# Patient Record
Sex: FEMALE | Race: WHITE | NOT HISPANIC OR LATINO | Employment: OTHER | URBAN - METROPOLITAN AREA
[De-identification: names, ages, dates, MRNs, and addresses within clinical notes are randomized per-mention and may not be internally consistent; named-entity substitution may affect disease eponyms.]

---

## 2018-01-02 ENCOUNTER — CONVERSION ENCOUNTER (OUTPATIENT)
Dept: OTHER | Facility: HOSPITAL | Age: 58
End: 2018-01-02

## 2018-03-02 ENCOUNTER — OFFICE VISIT CONVERTED (OUTPATIENT)
Dept: FAMILY MEDICINE CLINIC | Age: 58
End: 2018-03-02
Attending: FAMILY MEDICINE

## 2018-07-13 ENCOUNTER — OFFICE VISIT CONVERTED (OUTPATIENT)
Dept: FAMILY MEDICINE CLINIC | Age: 58
End: 2018-07-13
Attending: FAMILY MEDICINE

## 2018-10-12 ENCOUNTER — OFFICE VISIT CONVERTED (OUTPATIENT)
Dept: FAMILY MEDICINE CLINIC | Age: 58
End: 2018-10-12
Attending: FAMILY MEDICINE

## 2019-01-18 ENCOUNTER — HOSPITAL ENCOUNTER (OUTPATIENT)
Dept: OTHER | Facility: HOSPITAL | Age: 59
Discharge: HOME | End: 2019-01-18
Attending: FAMILY MEDICINE

## 2019-01-18 ENCOUNTER — OFFICE VISIT CONVERTED (OUTPATIENT)
Dept: FAMILY MEDICINE CLINIC | Age: 59
End: 2019-01-18
Attending: FAMILY MEDICINE

## 2019-01-18 LAB
ALBUMIN SERPL-MCNC: 3.8 G/DL (ref 3.5–5)
ALBUMIN/GLOB SERPL: 1.1 {RATIO} (ref 1.4–2.6)
ALP SERPL-CCNC: 88 U/L (ref 53–141)
ALT SERPL-CCNC: 31 U/L (ref 10–40)
ANION GAP SERPL CALC-SCNC: 21 MMOL/L (ref 8–19)
AST SERPL-CCNC: 29 U/L (ref 15–50)
BILIRUB SERPL-MCNC: 0.3 MG/DL (ref 0.2–1.3)
BUN SERPL-MCNC: 18 MG/DL (ref 5–25)
BUN/CREAT SERPL: 20 {RATIO} (ref 6–20)
CALCIUM SERPL-MCNC: 10.1 MG/DL (ref 8.7–10.4)
CHLORIDE SERPL-SCNC: 95 MMOL/L (ref 99–111)
CHOLEST SERPL-MCNC: 146 MG/DL (ref 107–200)
CHOLEST/HDLC SERPL: 3.5 {RATIO} (ref 3–6)
CONV CO2: 26 MMOL/L (ref 22–32)
CONV TOTAL PROTEIN: 7.4 G/DL (ref 6.3–8.2)
CREAT UR-MCNC: 0.92 MG/DL (ref 0.5–0.9)
EST. AVERAGE GLUCOSE BLD GHB EST-MCNC: 171 MG/DL
GFR SERPLBLD BASED ON 1.73 SQ M-ARVRAT: >60 ML/MIN/{1.73_M2}
GLOBULIN UR ELPH-MCNC: 3.6 G/DL (ref 2–3.5)
GLUCOSE SERPL-MCNC: 180 MG/DL (ref 65–99)
HBA1C MFR BLD: 7.6 % (ref 3.5–5.7)
HDLC SERPL-MCNC: 42 MG/DL (ref 40–60)
LDLC SERPL CALC-MCNC: 59 MG/DL (ref 70–100)
OSMOLALITY SERPL CALC.SUM OF ELEC: 292 MOSM/KG (ref 273–304)
POTASSIUM SERPL-SCNC: 3.8 MMOL/L (ref 3.5–5.3)
SODIUM SERPL-SCNC: 138 MMOL/L (ref 135–147)
TRIGL SERPL-MCNC: 224 MG/DL (ref 40–150)
VLDLC SERPL-MCNC: 45 MG/DL (ref 5–37)

## 2019-01-24 LAB
CONV TRAMADOL (URINE): 5424 NG/ML
O-DESMETHYLTRAMADOL, UR: 2032 NG/ML

## 2019-05-06 ENCOUNTER — OFFICE VISIT CONVERTED (OUTPATIENT)
Dept: FAMILY MEDICINE CLINIC | Age: 59
End: 2019-05-06
Attending: FAMILY MEDICINE

## 2019-09-06 ENCOUNTER — OFFICE VISIT CONVERTED (OUTPATIENT)
Dept: FAMILY MEDICINE CLINIC | Age: 59
End: 2019-09-06
Attending: FAMILY MEDICINE

## 2019-09-06 ENCOUNTER — HOSPITAL ENCOUNTER (OUTPATIENT)
Dept: OTHER | Facility: HOSPITAL | Age: 59
Discharge: HOME | End: 2019-09-06
Attending: FAMILY MEDICINE

## 2019-09-06 LAB
ALBUMIN SERPL-MCNC: 3.9 G/DL (ref 3.5–5)
ALBUMIN/GLOB SERPL: 1 {RATIO} (ref 1.4–2.6)
ALP SERPL-CCNC: 100 U/L (ref 53–141)
ALT SERPL-CCNC: 28 U/L (ref 10–40)
ANION GAP SERPL CALC-SCNC: 23 MMOL/L (ref 8–19)
AST SERPL-CCNC: 24 U/L (ref 15–50)
BILIRUB SERPL-MCNC: 0.3 MG/DL (ref 0.2–1.3)
BUN SERPL-MCNC: 19 MG/DL (ref 5–25)
BUN/CREAT SERPL: 23 {RATIO} (ref 6–20)
CALCIUM SERPL-MCNC: 9.6 MG/DL (ref 8.7–10.4)
CHLORIDE SERPL-SCNC: 95 MMOL/L (ref 99–111)
CHOLEST SERPL-MCNC: 159 MG/DL (ref 107–200)
CHOLEST/HDLC SERPL: 3.7 {RATIO} (ref 3–6)
CONV CO2: 23 MMOL/L (ref 22–32)
CONV CREATININE URINE, RANDOM: 98.9 MG/DL (ref 10–300)
CONV MICROALBUM.,U,RANDOM: <12 MG/L (ref 0–20)
CONV TOTAL PROTEIN: 7.7 G/DL (ref 6.3–8.2)
CREAT UR-MCNC: 0.82 MG/DL (ref 0.5–0.9)
EST. AVERAGE GLUCOSE BLD GHB EST-MCNC: 177 MG/DL
GFR SERPLBLD BASED ON 1.73 SQ M-ARVRAT: >60 ML/MIN/{1.73_M2}
GLOBULIN UR ELPH-MCNC: 3.8 G/DL (ref 2–3.5)
GLUCOSE SERPL-MCNC: 161 MG/DL (ref 65–99)
HBA1C MFR BLD: 7.8 % (ref 3.5–5.7)
HDLC SERPL-MCNC: 43 MG/DL (ref 40–60)
LDLC SERPL CALC-MCNC: 65 MG/DL (ref 70–100)
MICROALBUMIN/CREAT UR: 12.1 MG/G{CRE} (ref 0–35)
OSMOLALITY SERPL CALC.SUM OF ELEC: 290 MOSM/KG (ref 273–304)
POTASSIUM SERPL-SCNC: 3.9 MMOL/L (ref 3.5–5.3)
SODIUM SERPL-SCNC: 137 MMOL/L (ref 135–147)
TRIGL SERPL-MCNC: 256 MG/DL (ref 40–150)
VLDLC SERPL-MCNC: 51 MG/DL (ref 5–37)

## 2019-12-09 ENCOUNTER — OFFICE VISIT CONVERTED (OUTPATIENT)
Dept: FAMILY MEDICINE CLINIC | Age: 59
End: 2019-12-09
Attending: FAMILY MEDICINE

## 2020-02-03 ENCOUNTER — HOSPITAL ENCOUNTER (OUTPATIENT)
Dept: OTHER | Facility: HOSPITAL | Age: 60
Discharge: HOME | End: 2020-02-03
Attending: FAMILY MEDICINE

## 2020-05-15 ENCOUNTER — OFFICE VISIT CONVERTED (OUTPATIENT)
Dept: FAMILY MEDICINE CLINIC | Age: 60
End: 2020-05-15
Attending: FAMILY MEDICINE

## 2020-08-28 ENCOUNTER — HOSPITAL ENCOUNTER (OUTPATIENT)
Dept: OTHER | Facility: HOSPITAL | Age: 60
Discharge: HOME | End: 2020-08-28
Attending: FAMILY MEDICINE

## 2020-08-28 LAB
ALBUMIN SERPL-MCNC: 4.1 G/DL (ref 3.5–5)
ALBUMIN/GLOB SERPL: 1.1 {RATIO} (ref 1.4–2.6)
ALP SERPL-CCNC: 99 U/L (ref 53–141)
ALT SERPL-CCNC: 32 U/L (ref 10–40)
ANION GAP SERPL CALC-SCNC: 19 MMOL/L (ref 8–19)
AST SERPL-CCNC: 28 U/L (ref 15–50)
BILIRUB SERPL-MCNC: 0.33 MG/DL (ref 0.2–1.3)
BUN SERPL-MCNC: 19 MG/DL (ref 5–25)
BUN/CREAT SERPL: 20 {RATIO} (ref 6–20)
CALCIUM SERPL-MCNC: 10.5 MG/DL (ref 8.7–10.4)
CHLORIDE SERPL-SCNC: 97 MMOL/L (ref 99–111)
CHOLEST SERPL-MCNC: 171 MG/DL (ref 107–200)
CHOLEST/HDLC SERPL: 3.6 {RATIO} (ref 3–6)
CONV CO2: 26 MMOL/L (ref 22–32)
CONV CREATININE URINE, RANDOM: 96.7 MG/DL (ref 10–300)
CONV MICROALBUM.,U,RANDOM: <12 MG/L (ref 0–20)
CONV TOTAL PROTEIN: 8 G/DL (ref 6.3–8.2)
CREAT UR-MCNC: 0.94 MG/DL (ref 0.5–0.9)
EST. AVERAGE GLUCOSE BLD GHB EST-MCNC: 180 MG/DL
GFR SERPLBLD BASED ON 1.73 SQ M-ARVRAT: >60 ML/MIN/{1.73_M2}
GLOBULIN UR ELPH-MCNC: 3.9 G/DL (ref 2–3.5)
GLUCOSE SERPL-MCNC: 153 MG/DL (ref 65–99)
HBA1C MFR BLD: 7.9 % (ref 3.5–5.7)
HDLC SERPL-MCNC: 47 MG/DL (ref 40–60)
LDLC SERPL CALC-MCNC: 81 MG/DL (ref 70–100)
MICROALBUMIN/CREAT UR: 12.4 MG/G{CRE} (ref 0–35)
OSMOLALITY SERPL CALC.SUM OF ELEC: 291 MOSM/KG (ref 273–304)
POTASSIUM SERPL-SCNC: 4.1 MMOL/L (ref 3.5–5.3)
SODIUM SERPL-SCNC: 138 MMOL/L (ref 135–147)
TRIGL SERPL-MCNC: 213 MG/DL (ref 40–150)
VLDLC SERPL-MCNC: 43 MG/DL (ref 5–37)

## 2020-09-04 ENCOUNTER — OFFICE VISIT CONVERTED (OUTPATIENT)
Dept: FAMILY MEDICINE CLINIC | Age: 60
End: 2020-09-04
Attending: FAMILY MEDICINE

## 2020-09-04 ENCOUNTER — HOSPITAL ENCOUNTER (OUTPATIENT)
Dept: OTHER | Facility: HOSPITAL | Age: 60
Discharge: HOME | End: 2020-09-04
Attending: FAMILY MEDICINE

## 2020-09-11 LAB
CONV TRAMADOL (URINE): NORMAL NG/ML
O-DESMETHYLTRAMADOL, UR: 3277 NG/ML

## 2021-01-29 ENCOUNTER — OFFICE VISIT CONVERTED (OUTPATIENT)
Dept: FAMILY MEDICINE CLINIC | Age: 61
End: 2021-01-29
Attending: FAMILY MEDICINE

## 2021-04-02 ENCOUNTER — HOSPITAL ENCOUNTER (OUTPATIENT)
Dept: OTHER | Facility: HOSPITAL | Age: 61
Discharge: HOME | End: 2021-04-02
Attending: FAMILY MEDICINE

## 2021-04-02 LAB
ALBUMIN SERPL-MCNC: 4.4 G/DL (ref 3.5–5)
ALBUMIN/GLOB SERPL: 1.2 {RATIO} (ref 1.4–2.6)
ALP SERPL-CCNC: 105 U/L (ref 53–141)
ALT SERPL-CCNC: 26 U/L (ref 10–40)
ANION GAP SERPL CALC-SCNC: 21 MMOL/L (ref 8–19)
AST SERPL-CCNC: 23 U/L (ref 15–50)
BILIRUB SERPL-MCNC: 0.3 MG/DL (ref 0.2–1.3)
BUN SERPL-MCNC: 23 MG/DL (ref 5–25)
BUN/CREAT SERPL: 21 {RATIO} (ref 6–20)
CALCIUM SERPL-MCNC: 10 MG/DL (ref 8.7–10.4)
CHLORIDE SERPL-SCNC: 96 MMOL/L (ref 99–111)
CHOLEST SERPL-MCNC: 172 MG/DL (ref 107–200)
CHOLEST/HDLC SERPL: 3.3 {RATIO} (ref 3–6)
CONV CO2: 26 MMOL/L (ref 22–32)
CONV TOTAL PROTEIN: 8.1 G/DL (ref 6.3–8.2)
CREAT UR-MCNC: 1.1 MG/DL (ref 0.5–0.9)
EST. AVERAGE GLUCOSE BLD GHB EST-MCNC: 143 MG/DL
GFR SERPLBLD BASED ON 1.73 SQ M-ARVRAT: 55 ML/MIN/{1.73_M2}
GLOBULIN UR ELPH-MCNC: 3.7 G/DL (ref 2–3.5)
GLUCOSE SERPL-MCNC: 111 MG/DL (ref 65–99)
HBA1C MFR BLD: 6.6 % (ref 3.5–5.7)
HDLC SERPL-MCNC: 52 MG/DL (ref 40–60)
LDLC SERPL CALC-MCNC: 75 MG/DL (ref 70–100)
OSMOLALITY SERPL CALC.SUM OF ELEC: 292 MOSM/KG (ref 273–304)
POTASSIUM SERPL-SCNC: 3.7 MMOL/L (ref 3.5–5.3)
SODIUM SERPL-SCNC: 139 MMOL/L (ref 135–147)
TRIGL SERPL-MCNC: 224 MG/DL (ref 40–150)
VLDLC SERPL-MCNC: 45 MG/DL (ref 5–37)

## 2021-05-07 ENCOUNTER — OFFICE VISIT CONVERTED (OUTPATIENT)
Dept: FAMILY MEDICINE CLINIC | Age: 61
End: 2021-05-07
Attending: FAMILY MEDICINE

## 2021-05-18 NOTE — PROGRESS NOTES
Sarahi PalafoxValentín 1960     Office/Outpatient Visit    Visit Date: Fri, Mar 2, 2018 03:30 pm    Provider: Kia Juarez MD (Assistant: Vanessa Castro MA)    Location: [facility]        Electronically signed by Kia Juarez MD on  2018 05:17:14 PM                             SUBJECTIVE:        CC:     Ms. Palafox is a 57 year old White female.  This is a follow-up visit.  3 month check up; DM, GERD, chronic back pain         HPI: Sarahi is here today to f/u on chronic issues.        She is on diclofenac and tramadol for OA in the bilateral shoulders and knees.   She follows with Dr. Cisneros for these issues.  She has tried Synvisc back in  but didn't get much relief from that.  For breakthrough pain, she uses her tramadol, usually 1-2 tabs per day.          She is on metformin for diabetes.  She is on a statin.  She is UTD on eye exam (2017).  She is UTD on foot exam (2017).         She is on atenolol/chlorthalidone for HTN.  BP has been well controlled.  No CP, SOB, palpitations.        She is on atorvastatin for HLD.        She is on Vesicare for urge incontinence.        She is on omeprazole for GERD.     ROS:     CONSTITUTIONAL:  Positive for fatigue.   Negative for fever.      EYES:  Negative for blurred vision.      E/N/T:  Negative for diminished hearing and nasal congestion.      CARDIOVASCULAR:  Negative for chest pain and palpitations.      RESPIRATORY:  Negative for recent cough and dyspnea.      GASTROINTESTINAL:  Negative for abdominal pain, constipation, diarrhea, nausea and vomiting.      GENITOURINARY:  Negative for urinary incontinence.      MUSCULOSKELETAL:  Positive for arthralgias and joint stiffness.   Negative for myalgias.      NEUROLOGICAL:  Negative for paresthesias and weakness.          PMH/FMH/SH:     Last Reviewed on 3/02/2018 04:06 PM by Kia Juarez    Past Medical History:             GYNECOLOGICAL HISTORY:        Problems with menstrual cycles  include irregular frequency/duration.      Contraception: S/P tubal ligation;    Menarche occurred at age 12.    Last Pap was unsure; No abnormal Paps    Last mammogram was 7 to 8 years Hospitalizations: Never     Hyperlipidemia    Hypertension         PAST MEDICAL HISTORY             CURRENT MEDICAL PROVIDERS:    Ophthalmologist: Twila Subramanian         PAST MEDICAL HISTORY                 ADVANCED DIRECTIVES: None         PREVENTIVE HEALTH MAINTENANCE             BONE DENSITY: has never been done     COLORECTAL CANCER SCREENING: declines colorectal cancer screening, understands reason for testing     EYE EXAM: Diabetic Eye Exam during this calendar year and results are in chart was last done 2017 Gopi Subramanina     MAMMOGRAM: Done within last 2 years and results in are chart was last done 1/3/2018 with normal results     PAP SMEAR: was last done 2018         Surgical History:          section: X 3;      high school had ligament repair left knee;         Family History:         Positive for Hypertension ( mother ) and Sudden Cardiac Death ( father -- 64--heart failure ).      Positive for Type 2 Diabetes ( mother ).  Father:  at age 64; Cause of death was COPD     Mother:  at age 88; Cause of death was sepsis;  Coronary Artery Disease; Hypertension;  Type 2 Diabetes         Social History:     Occupation: Unemployed     Marital Status:      Children: 3 children (ages 20, 17, 15 )     Ms. Palafox denies any current form of exercise.          Tobacco/Alcohol/Supplements:     Last Reviewed on 3/02/2018 04:06 PM by Kia Juarze    Tobacco: She has never smoked.          Alcohol:  Does not drink alcohol and never has.      Caffeine:  She admits to consuming caffeine via soda ( 2 servings per day ).          Substance Abuse History:     Last Reviewed on 3/02/2018 04:06 PM by Kia Juarez         Mental Health History:     Last Reviewed on 3/02/2018 04:06 PM by Larry  Kia    NEGATIVE         Communicable Diseases (eg STDs):     Last Reviewed on 3/02/2018 04:06 PM by Kia Juarez    Reportable health conditions; NEGATIVE             Current Problems:     Last Reviewed on 11/21/2017 04:31 PM by Kia Juarez    Type 2 diabetes     Use of high risk medications     Vitamin D deficiency, unspecified     Urinary frequency     Leukocytosis, unspecified     Pulmonary hypertension     GERD     Hyperlipidemia     Generalized osteoarthritis     Essential hypertension         Immunizations:     [employer] Flu P13 10/24/2007         Allergies:     Last Reviewed on 11/21/2017 04:31 PM by Kia Juarez    Penicillins:        Current Medications:     Last Reviewed on 11/21/2017 04:31 PM by Kia Juarez    Atenolol/Chlorthalidone 100mg/25mg Tablet Take 1 tablet(s) by mouth daily     Tramadol 50mg Tablet 1 tab TID PRN     Diclofenac Sodium 75mg Tablets, Enteric Coated 1 tab bid with food     VESIcare 5mg Tablet Take 1 tablet(s) by mouth daily     Omeprazole 20mg Capsules, Extended Release 1 capsule daily     Atorvastatin Calcium 20mg Tablet 1 tab daily     Vitamin D3 2,000IU Capsules 1 capsule daily     Metformin HCl 500mg Tablet 1 tab bid     Loratadine 10mg Capsules Take 1 capsule(s) by mouth daily     Magnesium Take one tablet once daily         OBJECTIVE:        Vitals:         Current: 3/2/2018 3:32:46 PM    Ht:  5 ft, 8 in;  Wt: 417 lbs;  BMI: 63.4    T: 98.2 F (oral);  BP: 140/84 mm Hg (left arm, sitting);  P: 98 bpm (finger clip, sitting);  sCr: 1.02 mg/dL;  GFR: 98.58        Exams:     PHYSICAL EXAM:     GENERAL: vital signs recorded - obese;  well groomed;  no apparent distress;     EYES: extraocular movements intact; conjunctiva and cornea are normal; PERRLA;     E/N/T: OROPHARYNX:  normal mucosa, dentition, gingiva, and posterior pharynx;     RESPIRATORY: normal respiratory rate and pattern with no distress; normal breath sounds with no rales, rhonchi, wheezes or rubs;      CARDIOVASCULAR: normal rate; rhythm is regular;  no systolic murmur; no edema;     GASTROINTESTINAL: nontender; normal bowel sounds;     BREAST/INTEGUMENT: small round clean-based ulcer smaller than a dime R buttocks, surrounded by dusky erythema and some swelling;     MUSCULOSKELETAL: normal gait; normal overall tone     PSYCHIATRIC: appropriate affect and demeanor; normal psychomotor function; normal speech pattern;         Lab/Test Results:             Urine temperature:  confirmed (03/02/2018),     All urine drug screen levels confirmed negative:  yes (03/02/2018),     Date and time of last pill:  Tramadol 3-2-18@630am (03/02/2018),     Performed by:  atc (03/02/2018),     Collection Time:  16:17 (03/02/2018),             ASSESSMENT           250.00   E11.9  Type 2 diabetes              DDx:     401.1   I10  Essential hypertension              DDx:     272.4   E78.2  Hyperlipidemia              DDx:     715.00   M15.0  Generalized osteoarthritis              DDx:     V58.69   Z79.899  Use of high risk medications              DDx:         ORDERS:         Meds Prescribed:       Refill of: Tramadol 50mg Tablet 1 tab TID PRN  #90 (Ninety) tablet(s) Refills: 2         Lab Orders:       [id number]  DIAB[address] [place] [address] LIPID,CMP, A1C: 85147, [id number], [id number]  (Send-Out)         [id number]  Drug test prsmv read direct optical obs pr date  (In-House)                   PLAN:          Type 2 diabetes She is on metformin for diabetes.  She is on a statin.  She is UTD on eye exam (6/2017).  She is UTD on foot exam (6/2017).   Due for labs; ordered as below.   RTC 4 months.     LABORATORY:  Labs ordered to be performed today include Diabetes Panel 1; CMP, Lipid, A1C.      FOLLOW-UP: Schedule a follow-up visit in 4 months..            Orders:       [id number]  DIAB1 - [facility] LIPID,CMP, A1C: [id number], [id number], 86305  (Send-Out)             Patient Education Handouts:       [facility] [place]           Essential hypertension BP has been running fine at  home.  No changes to meds.  No refills needed.  Checking labs.          Hyperlipidemia No refills needed.  Checking labs.          Generalized osteoarthritis Stable on diclofenac with tramadol for breakthrough pain.  No adverse effects.  She does require ongoing use of this controlled substance to function.  Refills sent.  Tox screen and Gregg run today.  RTC 4 months.           Prescriptions:       Refill of: Tramadol 50mg Tablet 1 tab TID PRN  #90 (Ninety) tablet(s) Refills: 2          Use of high risk medications     Controlled substance documentation: Gregg reviewed; drug screen performed and appropriate; consent is reviewed and signed and on the chart.  She is aware of risk of addiction on this medication, understands that she will need to follow up for a review every 3 months and her medications will be adjusted or decreased as deemed appropriate at each visit.  No history of drug or alcohol abuse.  No concerns about diversion or abuse. She denies side effects related to the medication.  She is aware that she may be called in for pill counts.  The dosing of this medication will be reviewed on a regular basis and reduced if possible..  Ongoing use of a controlled substance is necessary for this patient to have a normal quality of life           Orders:       [id number]  Drug test prsmv read direct optical obs pr date  (In-House)               Patient Recommendations:        For  Type 2 diabetes:     Schedule a follow-up visit in 4 months.                APPOINTMENT INFORMATION:        Monday Tuesday Wednesday Thursday Friday Saturday Sunday            Time:___________________AM  PM   Date:_____________________             CHARGE CAPTURE           **Please note: ICD descriptions below are intended for billing purposes only and may not represent clinical diagnoses**        Primary Diagnosis:         250.00 Type 2 diabetes            E11.9    Type 2 diabetes mellitus without complications              Orders:           [id number]   Office/outpatient visit; established patient, level 4  (In-House)           401.1 Essential hypertension            I10    Essential (primary) hypertension    272.4 Hyperlipidemia            E78.2    Mixed hyperlipidemia    715.00 Generalized osteoarthritis            M15.0    Primary generalized (osteo)arthritis    V58.69 Use of high risk medications            Z79.899    Other long term (current) drug therapy              Orders:          [id number]   Drug test prsmv read direct optical obs pr date  (In-House)

## 2021-05-18 NOTE — PROGRESS NOTES
Sarahi Palafox  1960     Office/Outpatient Visit    Visit Date: Fri, Sep 4, 2020 03:20 pm    Provider: Kia Juarez MD (Assistant: Maya Gilbert RN)    Location: [facility]        Electronically signed by Kia Juarez MD on  09/04/2020 04:55:16 PM                             Subjective:        CC: Ms. Palafox is a 60 year old White female.  4 month follow up;         HPI:  Sarahi is here today for routine f/u on chronic issues.        She is on tramadol and diclofenac for generalized OA.  Pain is worst in the bilateral shoulders and knees.  She uses the tramadol 1-2 tabs per day.  She uses a cane to ambulate.  She follows with Dr. Cisneros but is not felt to be a surgical candidate currently.  Has tried Synvisc but this did not help.  No adverse effects.         She is on metformin for diabetes.  A1c just done came back at 7.9, so we are starting glimepiride 1 mg and this has already been sent.  She is due for eye exam (8/2019) done at [place].  She is due for foot exam (9/2019). She is on a statin.   She is on gabapentin for diabetic peripheral neuropathy.  Pain is fairly well controlled.  No adverse effects.        She is on atenolol/chlorthalidone for HTN.  BP has been well controlled at home.  No CP, SOB, palpitations.        She is on atorvastatin for HLD.  No myalgias or other adverse effects.         She is on Vesicare for urge incontinence.  Urinary sx well controlled.        She is on omeprazole for GERD.  No heartburn or indigestion.    ROS:     CONSTITUTIONAL:  Positive for fatigue.   Negative for fever.      EYES:  Negative for blurred vision.      E/N/T:  Positive for nasal congestion and frequent rhinorrhea.   Negative for diminished hearing.      CARDIOVASCULAR:  Negative for chest pain and palpitations.      RESPIRATORY:  Negative for recent cough and dyspnea.      GASTROINTESTINAL:  Negative for abdominal pain, constipation, diarrhea, nausea and vomiting.       MUSCULOSKELETAL:  Positive for arthralgias, joint stiffness and L > R foot pain.   Negative for myalgias.      NEUROLOGICAL:  Positive for paresthesia ( bilateral lower extremity ) and numbness bilateral fingertips.   Negative for weakness.      PSYCHIATRIC:  Negative for anxiety, depression and sleep disturbance.          Past Medical History / Family History / Social History:         Last Reviewed on 2020 03:52 PM by Kia Juarez    Past Medical History:                 PAST MEDICAL HISTORY             GYNECOLOGICAL HISTORY:        Problems with menstrual cycles include irregular frequency/duration.      Contraception: S/P tubal ligation;    Menarche occurred at age 12.  Hospitalizations: Never     Hyperlipidemia    Hypertension         PAST MEDICAL HISTORY             CURRENT MEDICAL PROVIDERS:    Ophthalmologist: Twila Subramanian         PAST MEDICAL HISTORY                 ADVANCED DIRECTIVES: None         PREVENTIVE HEALTH MAINTENANCE             BONE DENSITY: has never been done     COLORECTAL CANCER SCREENING: declines colorectal cancer screening, understands reason for testing     EYE EXAM: Diabetic Eye Exam during this calendar year and results are in chart was last done 2018, no retinopathy     Hepatitis C Medicare Screening: was last done 2017     MAMMOGRAM: Done within last 2 years and results in are chart was last done 2020 with normal results     PAP SMEAR: was last done 2018         Surgical History:          section: X 3;     high school had ligament repair left knee;         Family History:         Positive for Hypertension ( mother ) and Sudden Cardiac Death ( father -- 64--heart failure ).      Positive for Type 2 Diabetes ( mother ).  Father:  at age 64; Cause of death was COPD     Mother:  at age 88; Cause of death was sepsis;  Coronary Artery Disease; Hypertension;  Type 2 Diabetes         Social History:     Occupation: Unemployed     Marital  Status:      Children: 3 children (ages 20, 17, 15 )     Ms. Palafox denies any current form of exercise.          Tobacco/Alcohol/Supplements:     Last Reviewed on 9/04/2020 03:52 PM by Kia Juarez    Tobacco: She has never smoked.          Alcohol:  Does not drink alcohol and never has.      Caffeine:  She admits to consuming caffeine via soda ( 2 servings per day ).          Substance Abuse History:     Last Reviewed on 9/04/2020 03:52 PM by Kia Juarez    None         Mental Health History:     Last Reviewed on 9/04/2020 03:52 PM by Kia Juarez    NEGATIVE         Communicable Diseases (eg STDs):     Last Reviewed on 9/04/2020 03:52 PM by Kia Juarez    Reportable health conditions; NEGATIVE         Current Problems:     Last Reviewed on 5/15/2020 04:05 PM by Kia Juarez    HTN - Essential (primary) hypertension    HLD - Mixed hyperlipidemia    Primary generalized (osteo)arthritis    GERD - Gastro-esophageal reflux disease without esophagitis    Frequency of micturition    Vitamin D deficiency, unspecified    Other long term (current) drug therapy    Type 2 diabetes mellitus with diabetic polyneuropathy    Primary pulmonary hypertension        Immunizations:     [employer] Flu P13 10/24/2007    Fluzone Quadrivalent (3+ years) 10/12/2018        Allergies:     Last Reviewed on 5/15/2020 04:05 PM by Kia Juarez    Penicillins:          Current Medications:     Last Reviewed on 9/04/2020 03:28 PM by Maya iGlbert    B12  [OTC daily]    atenoloL-chlorthalidone 100-25 mg oral tablet [TAKE ONE TABLET BY MOUTH DAILY]    VESIcare 5mg Tablet [Take 1 tablet(s) by mouth daily]    solifenacin 5 mg oral tablet [TAKE ONE TABLET BY MOUTH DAILY]    diclofenac sodium 75 mg oral tablet, delayed release (enteric coated) [TAKE ONE TABLET BY MOUTH TWICE A DAY WITH FOOD]    omeprazole 20 mg oral capsule,delayed release (enteric coated) [TAKE ONE CAPSULE BY MOUTH DAILY]    Vitamin D3 2,000 unit oral capsule  [1 capsule daily]    traMADol 50 mg oral tablet [TAKE ONE TABLET BY MOUTH THREE TIMES A DAY AS NEEDED]    atorvastatin 20 mg oral tablet [TAKE ONE TABLET BY MOUTH DAILY]    metFORMIN 1,000 mg oral tablet [TAKE ONE TABLET BY MOUTH TWICE A DAY]    Silvadene 1% Cream [Apply sufficient amount to affected area bid  prn]    Accu-Chek Meri Plus test strips  [CHECK BLOOD SUGAR ONCE OR TWICE DAILY]    gabapentin 400 mg oral capsule [TAKE ONE CAPSULE BY MOUTH EVERY NIGHT AT BEDTIME]    fluticasone propionate 50 mcg/actuation Intranasal Spray, Suspension [SPRAY ONE SPRAY IN EACH NOSTRIL ONCE DAILY]    Accu-Chek Softclix Lancets  [Use as directed to check blood sugar one -two times a day]    glimepiride 1 mg oral tablet [take 1 tablet (1 mg) by oral route once daily]        Objective:        Vitals:         Current: 9/4/2020 3:31:11 PM    Ht:  5 ft, 8 in;  Wt: 399 lbs;  BMI: 60.7T: 96.9 F (temporal);  BP: 144/57 mm Hg (left arm, sitting);  P: 58 bpm (left arm (BP Cuff), sitting);  sCr: 0.94 mg/dL;  GFR: 101.34        Repeat:     4:11:46 PM  BP:   124/54mm Hg (left arm, sitting)     Exams:     PHYSICAL EXAM:     GENERAL: vital signs recorded - obese;  well groomed;  no apparent distress;     EYES: extraocular movements intact; conjunctiva and cornea are normal; PERRLA;     E/N/T: OROPHARYNX:  normal mucosa, dentition, gingiva, and posterior pharynx;     RESPIRATORY: normal respiratory rate and pattern with no distress; normal breath sounds with no rales, rhonchi, wheezes or rubs;     CARDIOVASCULAR: normal rate; rhythm is regular;  no systolic murmur; no edema;     GASTROINTESTINAL: nontender; normal bowel sounds;     MUSCULOSKELETAL: normal gait; normal overall tone     Left foot exam    Protective sensation using Monofilament test: Loss of protective sensation. Approximately 4 grams of force is applied without sensory awareness.    Vascular status: normal peripheral vascular exam with palpable dorsal pedal and posterior tibal  pulses and brisk digital capillary refill    Skin integrity: Callus on ball of foot    Right foot exam    Protective sensation using Monofilament test: Loss of protective sensation. Approximately 4 grams of force is applied without sensory awareness.    Vascular status: normal peripheral vascular exam with palpable dorsal pedal and posterior tibal pulses and brisk digital capillary refill    Skin integrity: Callus on ball of foot         Lab/Test Results:         Urine temperature: urine didn't temp per nurse (09/04/2020),     All urine drug screen levels confirmed negative: yes (09/04/2020),     Date and time of last pill: gabapentin-9/3/20 @ 11pm, tramadol- 9/4/20 11am-nikolas (09/04/2020),     Performed by: pr (09/04/2020),     Collection Time: 15:41 (09/04/2020),             Assessment:         E11.42   Type 2 diabetes mellitus with diabetic polyneuropathy       M15.0   Primary generalized (osteo)arthritis       Z79.899   Other long term (current) drug therapy       I10   HTN - Essential (primary) hypertension       E78.2   HLD - Mixed hyperlipidemia       K21.9   GERD - Gastro-esophageal reflux disease without esophagitis       I27.0   Primary pulmonary hypertension           ORDERS:         Lab Orders:       [id number]  Drug test prsmv read direct optical obs pr date  (In-House)            APPTO  Appointment need  (In-House)            39961  TRAMU - [facility] [id number] TRAMADOL AND METABOLITES  (Send-Out)              Procedures Ordered:       REFER  Referral to Specialist or Other Facility  (Send-Out)              Other Orders:         Screening mammogram results documented  (Send-Out)            2028F  Foot examination performed (includes examination through visual inspection, sensory exam with monofilament, and pulse exam - report when any of the three components are completed) (DM)4  (In-House)                      Plan:         Type 2 diabetes mellitus with diabetic polyneuropathyShe is on metformin for diabetes.  A1c  just done came back at 7.9, so we are starting glimepiride 1 mg and this has already been sent.  She is due for eye exam (8/2019) done at [place]; referral placed.  She is due for foot exam (9/2019); exam today shows complete loss of sensation. She is on a statin.   She is on gabapentin for diabetic peripheral neuropathy.  Pain is fairly well controlled.  No adverse effects.  Controlled substance monitoring.  RTC 4 months for AWV.        REFERRALS:  Referral initiated to an ophthalmologist ( at [facility]; for evaluation of diabetic eye exam ).  [facility] PHQ-9 Depression Screening: Completed form scanned and in chart; Total Score 3     FOLLOW-UP: Schedule a follow-up visit in 4 months.:.  Medicare wellness 30 min with Larry          Orders:         Screening mammogram results documented  (Send-Out)            APPTO  Appointment need  (In-House)            REFER  Referral to Specialist or Other Facility  (Send-Out)              Primary generalized (osteo)arthritisStable on current regimen.  Sx well controlled.  No adverse effects.  She does require ongoing use of this controlled substance to function.  Tox screen and Gregg run today.  No refills needed.        Other long term (current) drug therapy    LABORATORY:  Labs ordered to be performed today include Drug Screen Urine [facility] Confirmation TRAMADOL AND METABOLITES.  Controlled substance documentation: Gregg reviewed; drug screen performed and appropriate; consent is reviewed and signed and on the chart.  She is aware of risk of addiction on this medication, understands that she will need to follow up for a review every 3 months and her medications will be adjusted or decreased as deemed appropriate at each visit.  No history of drug or alcohol abuse.  No concerns about diversion or abuse. She denies side effects related to the medication.  She is aware that she may be called in for pill counts.  The dosing of this medication will be reviewed on a regular  basis and reduced if possible..  Ongoing use of a controlled substance is necessary for this patient to have a normal quality of life           Orders:       98396  Drug test prsmv read direct optical obs pr date  (In-House)            26016  TRAMU - H [id number] TRAMADOL AND METABOLITES  (Send-Out)              HTN - Essential (primary) hypertensionStable.  No refills needed.  Labs reviewed with Sarahi today and copy provided.        HLD - Mixed hyperlipidemiaStable.  No refills needed.  Labs reviewed with Sarahi today and copy provided.        GERD - Gastro-esophageal reflux disease without esophagitisStable.  No refills needed.        Primary pulmonary hypertensionStable.            Other Orders      2028F  Foot examination performed (includes examination through visual inspection, sensory exam with monofilament, and pulse exam - report when any of the three components are completed) (DM)4  (In-House)              Patient Recommendations:        For  Type 2 diabetes mellitus with diabetic polyneuropathy:    Schedule a follow-up visit in 4 months.                APPOINTMENT INFORMATION:        Monday Tuesday Wednesday Thursday Friday Saturday Sunday            Time:___________________AM  PM   Date:_____________________             Charge Capture:         Primary Diagnosis:     E11.42  Type 2 diabetes mellitus with diabetic polyneuropathy           Orders:      85961  Office/outpatient visit; established patient, level 4  (In-House)            APPTO  Appointment need  (In-House)              M15.0  Primary generalized (osteo)arthritis     Z79.899  Other long term (current) drug therapy           Orders:      21101  Drug test prsmv read direct optical obs pr date  (In-House)              I10  HTN - Essential (primary) hypertension     E78.2  HLD - Mixed hyperlipidemia     K21.9  GERD - Gastro-esophageal reflux disease without esophagitis     I27.0  Primary pulmonary hypertension         Other Orders:       2028F   Foot examination performed (includes examination through visual inspection, sensory exam with monofilament, and pulse exam - report when any of the three components are completed) (DM)4  (In-House)

## 2021-05-18 NOTE — PROGRESS NOTES
Sarahi Palafox  1960     Office/Outpatient Visit    Visit Date: Fri, Jan 29, 2021 04:06 pm    Provider: Kia Juarez MD (Assistant: Leesa Watkins MA)    Location: [facility]        Electronically signed by Kia Juarez MD on  01/29/2021 05:23:39 PM                             Subjective:        CC: Ms. Palafox is a 60 year old White female.  Patient presents today for [facility] visit;         HPI:       She is due for colonoscopy.  She is UTD on pap smear, last done 1/2018 and this was NIL.  She is UTD on mammogram, last done 2/2020 and this was normal.  She is due for Shingrix, Td and flu.  She is due for routine labs including DM panel.  She is due for eye exam, scheduled for 3/12/21 at [facility].  She is UTD on foot exam (9/2020).        She is on tramadol and diclofenac for generalized OA.  Pain is worst in the bilateral shoulders and knees.  Shoulders, especially R shoulder, has been particularly bad.  Last XRs in 2016.  She has seen Dr. Cisneros before and he wanted to operate on the shoulders.  She uses the tramadol 1-2 tabs per day.  She uses a cane to ambulate.  No adverse effects.         She is on metformin and glimepiride for diabetes.  Due for labs.  [facility]  She is UTD on foot exam (9/2020).  She is on a statin.   She is on gabapentin for diabetic peripheral neuropathy.  Pain is fairly well controlled.  No adverse effects.        She has CPAP for ROSI.  She has been getting very dehydrated with that.  She has been trying nasal saline during the day and that has helped somewhat.    Ms. Palafox is here for a Medicare wellness visit.  The required HRA questions are integrated within this visit note. Family medical history and individual medical/surgical history were reviewed and updated.  A current height, weight, BMI, blood pressure, and pulse were recorded in the vitals section of the note and have been reviewed. Patient's medications,  including supplements, were recorded in the chart and reviewed.  Current providers and suppliers were reviewed and updated.          Self-Assessment of Health: She rates her health as fair. She rates her confidence of being able to control/manage most of her health problems as somewhat confident. Her physical/emotional health has limited her social activites quite a bit.  A review of cognitive impairment was performed, including ability to drive a car, manage finances, and any memory changes, and was found to be negative.  A review of functional ability, including bathing, dressing, walking, and urine/bowel continence as well as level of safety was performed and was found to be negative.  Falls Risk: Has not had any falls or only one fall without injury in the past year.  She denies having trouble hearing the TV/radio when others do not, having to strain to hear or understand conversations and wearing hearing aid(s).  Concerning home safety, she reports that at home she DOES have adequate lighting, a skid resistant shower/tub, handrails on stairs, functioning smoke alarms and absence of throw rugs, but not grab bars in the bath.  Physical Activity: She never excercises.; Type of diet patient normally eats is described as well-balanced with fruits and vegetables Tobacco: She has never smoked.  Preventative Health updated today     ROS:     CONSTITUTIONAL:  Positive for fatigue.   Negative for fever.      EYES:  Negative for blurred vision.      E/N/T:  Positive for nasal congestion and frequent rhinorrhea.   Negative for diminished hearing.      CARDIOVASCULAR:  Negative for chest pain and palpitations.      RESPIRATORY:  Negative for recent cough and dyspnea.      GASTROINTESTINAL:  Negative for abdominal pain, constipation, diarrhea, nausea and vomiting.      MUSCULOSKELETAL:  Positive for arthralgias and (worst in shoulders) joint stiffness.   Negative for myalgias.      NEUROLOGICAL:  Positive for paresthesia (  bilateral lower extremity ) and numbness bilateral fingertips.   Negative for weakness.      PSYCHIATRIC:  Negative for anxiety, depression and sleep disturbance.          Past Medical History / Family History / Social History:         Last Reviewed on 2020 03:52 PM by Kia Juarez    Past Medical History:                 PAST MEDICAL HISTORY             GYNECOLOGICAL HISTORY:        Problems with menstrual cycles include irregular frequency/duration.      Contraception: S/P tubal ligation;    Menarche occurred at age 12.  Hospitalizations: Never     Hyperlipidemia    Hypertension         PAST MEDICAL HISTORY             CURRENT MEDICAL PROVIDERS:    Ophthalmologist: Twila Subramanian         PAST MEDICAL HISTORY                 ADVANCED DIRECTIVES: None         PREVENTIVE HEALTH MAINTENANCE             BONE DENSITY: has never been done     COLORECTAL CANCER SCREENING: declines colorectal cancer screening, understands reason for testing     EYE EXAM: Diabetic Eye Exam during this calendar year and results are in chart was last done 2018, no retinopathy     Hepatitis C Medicare Screening: was last done 2017     MAMMOGRAM: Done within last 2 years and results in are chart was last done 2020 with normal results     PAP SMEAR: was last done 2018         Surgical History:          section: X 3;     high school had ligament repair left knee;         Family History:         Positive for Hypertension ( mother ) and Sudden Cardiac Death ( father -- 64--heart failure ).      Positive for Type 2 Diabetes ( mother ).  Father:  at age 64; Cause of death was COPD     Mother:  at age 88; Cause of death was sepsis;  Coronary Artery Disease; Hypertension;  Type 2 Diabetes         Social History:     Occupation: Unemployed     Marital Status:      Children: 3 children (ages 20, 17, 15 )     Ms. Palafox denies any current form of exercise.          Tobacco/Alcohol/Supplements:      Last Reviewed on 9/04/2020 03:52 PM by Kia Juarez    Tobacco: She has never smoked.          Alcohol:  Does not drink alcohol and never has.      Caffeine:  She admits to consuming caffeine via soda ( 2 servings per day ).          Substance Abuse History:     Last Reviewed on 9/04/2020 03:52 PM by Kia Juarez    None         Mental Health History:     Last Reviewed on 9/04/2020 03:52 PM by Kia Juarez    NEGATIVE         Communicable Diseases (eg STDs):     Last Reviewed on 9/04/2020 03:52 PM by Kia Juarez    Reportable health conditions; NEGATIVE         Current Problems:     Last Reviewed on 9/04/2020 03:52 PM by Kia Juarez    HTN - Essential (primary) hypertension    HLD - Mixed hyperlipidemia    Primary generalized (osteo)arthritis    GERD - Gastro-esophageal reflux disease without esophagitis    Frequency of micturition    Vitamin D deficiency, unspecified    Other long term (current) drug therapy    Type 2 diabetes mellitus with diabetic polyneuropathy    Primary pulmonary hypertension        Immunizations:     [employer] Flu P13 10/24/2007    Fluzone Quadrivalent (3+ years) 10/12/2018        Allergies:     Last Reviewed on 1/29/2021 04:12 PM by Leesa Watkins    Penicillins:          Current Medications:     Last Reviewed on 1/29/2021 04:12 PM by Leesa Watkins    B12  [OTC daily]    atenoloL-chlorthalidone 100-25 mg oral tablet [TAKE ONE TABLET BY MOUTH DAILY]    VESIcare 5mg Tablet [Take 1 tablet(s) by mouth daily]    omeprazole 20 mg oral capsule,delayed release (enteric coated) [TAKE ONE CAPSULE BY MOUTH DAILY]    diclofenac sodium 75 mg oral tablet, delayed release (enteric coated) [TAKE ONE TABLET BY MOUTH TWICE A DAY WITH FOOD]    Vitamin D3 2,000 unit oral capsule [1 capsule daily]    traMADol 50 mg oral tablet [TAKE ONE TABLET BY MOUTH THREE TIMES A DAY AS NEEDED]    atorvastatin 20 mg oral tablet [TAKE ONE TABLET BY MOUTH DAILY]    metFORMIN 1,000 mg oral tablet [TAKE ONE  TABLET BY MOUTH TWICE A DAY]    Silvadene 1% Cream [Apply sufficient amount to affected area bid  prn]    Accu-Chek Meri Plus test strips  [CHECK BLOOD SUGAR ONCE OR TWICE DAILY]    gabapentin 400 mg oral capsule [TAKE ONE CAPSULE BY MOUTH EVERY NIGHT AT BEDTIME]    fluticasone propionate 50 mcg/actuation Intranasal Spray, Suspension [SPRAY ONE SPRAY IN EACH NOSTRIL ONCE DAILY]    Accu-Chek Softclix Lancets  [Use as directed to check blood sugar one -two times a day]    glimepiride 1 mg oral tablet [take 1 tablet (1 mg) by oral route once daily]        Objective:        Vitals:         Current: 1/29/2021 4:12:54 PM    Ht:  5 ft, 8 in;  Wt: 391.6 lbs;  BMI: 59.5T: 97 F (temporal);  BP: 145/64 mm Hg (left arm, sitting);  P: 67 bpm (left arm (BP Cuff), sitting);  sCr: 0.94 mg/dL;  GFR: 100.54        Exams:     PHYSICAL EXAM:     GENERAL: vital signs recorded - obese;  well groomed;  no apparent distress;     EYES: extraocular movements intact; conjunctiva and cornea are normal; PERRLA;     E/N/T: OROPHARYNX:  normal mucosa, dentition, gingiva, and posterior pharynx;     RESPIRATORY: normal respiratory rate and pattern with no distress; normal breath sounds with no rales, rhonchi, wheezes or rubs;     CARDIOVASCULAR: normal rate; rhythm is regular;  no systolic murmur; no edema;     GASTROINTESTINAL: nontender; normal bowel sounds;     MUSCULOSKELETAL: normal gait; normal overall tone     NEUROLOGIC: mental status: alert and oriented x 3; Reflexes: brachioradialis: 2+; knee jerks: 2+;     PSYCHIATRIC: appropriate affect and demeanor; normal psychomotor function; normal speech pattern;         Lab/Test Results:         Urine temperature: not confirmed (01/29/2021),     All urine drug screen levels confirmed negative: yes (01/29/2021),     Date and time of last pill: gabapentin 1- at 2400, tramadol 1- at 1300/ashok (01/29/2021),     Performed by: ashok (01/29/2021),     Collection Time: 1708 (01/29/2021),              Assessment:         Z00.00   Encounter for general adult medical examination without abnormal findings       M25.511   Pain in right shoulder       M25.512   Pain in left shoulder       M15.0   Primary generalized (osteo)arthritis       Z79.899   Other long term (current) drug therapy       E11.42   Type 2 diabetes mellitus with diabetic polyneuropathy       Z12.31   Encounter for screening mammogram for malignant neoplasm of breast       G47.33   Obstructive sleep apnea (adult) (pediatric)           ORDERS:         Radiology/Test Orders:       [id number]  Right Radiologic exam, shoulder; comp, 2 views  (Send-Out)            [id number]  Left Radiologic exam, shoulder; comp, 2 views  (Send-Out)            [id number]  Screening mammography bi 2-view inc CAD  (Send-Out)              Lab Orders:       [id number]  Drug test prsmv qual dir optical obs per day  (In-House)            [id number]  DIAB - [facility] LIPID,CMP, A1C: 42066, 69227, 10975  (Send-Out)            APPTO  Appointment need  (In-House)              Procedures Ordered:         Annual wellness visit, includes a PPPS, subsequent visit  (In-House)                      Plan:         Encounter for general adult medical examination without abnormal findingsShe is due for colonoscopy; declines today.  She is UTD on pap smear, last done 1/2018 and this was NIL.  She is UTD on mammogram, last done 2/2020 and this was normal.  Will be due next month.  She is due for Shingrix, Td and flu; declines flu today.  Planning to get COVID vaccine ASAP.  She is due for routine labs including DM panel; ordered.  She is due for eye exam, scheduled for 3/12/21 at [facility].  She is UTD on foot exam (9/2020).  No fall risk, no memory issues, no signs/symptoms of depression.  She lives with her .  She is able to drive and perform ADLs/manage finances independently.  Hearing is adequate.  She does not have a living will.  Preventive services handout and safety handout were given to  her.  Current doctor list updated.  RTC 3 months.    ADVANCED DIRECTIVES: None         FOLLOW-UP: Schedule a follow-up visit in 3 months.:.  f/u OA with [facility]          Orders:         Annual wellness visit, includes a PPPS, subsequent visit  (In-House)            APPTO  Appointment need  (In-House)              Pain in right shoulderMariola has seen Dr. Cisneros in the past for shoulder pain.  He wanted to operate at that time but she was not ready.  Pain has been worse for the past 6 months and is getting to the point where it impacts her function.  She is still not quite ready to go back to Dr. Cisneros but we will get XRs today so that we are ready if and when she would like this referral.  She can call back at any time.        RADIOLOGY:  I have ordered Shoulder x-ray: bilateral Right left shoulder to be done today.            Orders:       [id number]  Right Radiologic exam, shoulder; comp, 2 views  (Send-Out)            [id number]  Left Radiologic exam, shoulder; comp, 2 views  (Send-Out)              Pain in left shoulderAs above.        Other long term (current) drug therapy    Controlled substance documentation: Gregg reviewed; drug screen performed and appropriate; consent is reviewed and signed and on the chart.  She is aware of risk of addiction on this medication, understands that she will need to follow up for a review every 3 months and her medications will be adjusted or decreased as deemed appropriate at each visit.  No history of drug or alcohol abuse.  No concerns about diversion or abuse. She denies side effects related to the medication.  She is aware that she may be called in for pill counts.  The dosing of this medication will be reviewed on a regular basis and reduced if possible..  Ongoing use of a controlled substance is necessary for this patient to have a normal quality of life           Orders:       [id number]  Drug test prsmv qual dir optical obs per day  (In-House)              Type 2 diabetes  mellitus with diabetic polyneuropathy    LABORATORY:  Labs ordered to be performed today include Diabetes Panel 1; CMP, Lipid, A1C.            Orders:       [id number]  DIAB - [facility] LIPID,CMP, A1C: 73005, 75431, 29775  (Send-Out)              Encounter for screening mammogram for malignant neoplasm of breast        RADIOLOGY:  I have ordered Mammogram Screening to be done today.            Orders:       [id number]  Screening mammography bi 2-view inc CAD  (Send-Out)              Obstructive sleep apnea (adult) (pediatric)Will send order to [employer] for humidifier for her CPAP to help with nosebleeds.            Patient Recommendations:        For  Encounter for general adult medical examination without abnormal findings:    Schedule a follow-up visit in 3 months.                APPOINTMENT INFORMATION:        Monday Tuesday Wednesday Thursday Friday Saturday Sunday            Time:___________________AM  PM   Date:_____________________             Charge Capture:         Primary Diagnosis:     Z00.00  Encounter for general adult medical examination without abnormal findings           Orders:        Annual wellness visit, includes a PPPS, subsequent visit  (In-House)            APPTO  Appointment need  (In-House)              M25.511  Pain in right shoulder     M25.512  Pain in left shoulder     M15.0  Primary generalized (osteo)arthritis     Z79.899  Other long term (current) drug therapy           Orders:      [id number]  Drug test prsmv qual dir optical obs per day  (In-House)              E11.42  Type 2 diabetes mellitus with diabetic polyneuropathy     Z12.31  Encounter for screening mammogram for malignant neoplasm of breast     G47.33  Obstructive sleep apnea (adult) (pediatric)

## 2021-05-18 NOTE — PROGRESS NOTES
"Sarahi Palafox  1960     Office/Outpatient Visit    Visit Date: Fri, May 15, 2020 03:45 pm    Provider: Kia Juarez MD (Assistant: Maya Gilbert RN)    Location: [facility]        Electronically signed by Kia Juarez MD on  05/15/2020 04:17:39 PM                             Subjective:        CC: Ms. Palafox is a 59 year old White female.  5 Month follow up ---doximity--[phone];         HPI: Sarahi's telehealth visit today is for f/u on chronic issues.  \"I'm doing all right.\"        She is on diclofenac and tramadol for generalized OA.  Bilateral shoulders and knees are the worst.  She takes the tramadol 1-2 tabs per day.  She uses a cane to ambulate.  She follows with Dr. Cisneros but is not felt to be a surgical candidate currently.  Has tried Synvisc but it was not effective.  No adverse effects with the tramadol.  She is on gabapentin for diabetic peripheral neuropathy.  \"Some day it is better than others.\"  We increased her dose to 400 mg at bedtime last visit, and that did help.         She is on metformin for diabetes.  She is UTD on eye exam (8/2019) done at [place].  She is UTD on foot exam (9/2019). She is on a statin.        She is on atenolol/chlorthalidone for HTN.  BP has been well controlled at home.  No CP, SOB, palpitations.        She is on atorvastatin for HLD.  No myalgias.        She is on Vesicare for urge incontinence.  Urinary sx well controlled.        She is on omeprazole for GERD.  No reflux or indigestion.    ROS:     CONSTITUTIONAL:  Positive for fatigue.   Negative for fever.      EYES:  Negative for blurred vision.      E/N/T:  Positive for nasal congestion and frequent rhinorrhea.   Negative for diminished hearing.      CARDIOVASCULAR:  Negative for chest pain and palpitations.      RESPIRATORY:  Negative for recent cough and dyspnea.      GASTROINTESTINAL:  Negative for abdominal pain, constipation, diarrhea, nausea and vomiting.      MUSCULOSKELETAL:  " Positive for arthralgias, joint stiffness and L > R foot pain.   Negative for myalgias.      NEUROLOGICAL:  Positive for paresthesia ( bilateral lower extremity ) and numbness bilateral fingertips.   Negative for weakness.      PSYCHIATRIC:  Negative for anxiety, depression and sleep disturbance.          Past Medical History / Family History / Social History:         Last Reviewed on 5/15/2020 04:05 PM by Kia Juarez    Past Medical History:                 PAST MEDICAL HISTORY             GYNECOLOGICAL HISTORY:        Problems with menstrual cycles include irregular frequency/duration.      Contraception: S/P tubal ligation;    Menarche occurred at age 12.  Hospitalizations: Never     Hyperlipidemia    Hypertension         PAST MEDICAL HISTORY             CURRENT MEDICAL PROVIDERS:    Ophthalmologist: Twila Subramanian         PAST MEDICAL HISTORY                 ADVANCED DIRECTIVES: None         PREVENTIVE HEALTH MAINTENANCE             BONE DENSITY: has never been done     COLORECTAL CANCER SCREENING: declines colorectal cancer screening, understands reason for testing     EYE EXAM: Diabetic Eye Exam during this calendar year and results are in chart was last done 2018, no retinopathy     Hepatitis C Medicare Screening: was last done 2017     MAMMOGRAM: Done within last 2 years and results in are chart was last done 2020 with normal results     PAP SMEAR: was last done 2018         Surgical History:          section: X 3;     high school had ligament repair left knee;         Family History:         Positive for Hypertension ( mother ) and Sudden Cardiac Death ( father -- 64--heart failure ).      Positive for Type 2 Diabetes ( mother ).  Father:  at age 64; Cause of death was COPD     Mother:  at age 88; Cause of death was sepsis;  Coronary Artery Disease; Hypertension;  Type 2 Diabetes         Social History:     Occupation: Unemployed     Marital Status:       Children: 3 children (ages 20, 17, 15 )     Ms. Palafox denies any current form of exercise.          Tobacco/Alcohol/Supplements:     Last Reviewed on 5/15/2020 04:05 PM by Kia Juarez    Tobacco: She has never smoked.          Alcohol:  Does not drink alcohol and never has.      Caffeine:  She admits to consuming caffeine via soda ( 2 servings per day ).          Substance Abuse History:     Last Reviewed on 5/15/2020 04:05 PM by Kia Juarez    None         Mental Health History:     Last Reviewed on 5/15/2020 04:05 PM by Kia Juarez    NEGATIVE         Communicable Diseases (eg STDs):     Last Reviewed on 5/15/2020 04:05 PM by Kia Juarez    Reportable health conditions; NEGATIVE         Current Problems:     Last Reviewed on 12/09/2019 04:47 PM by Kia Juarez    HTN - Essential (primary) hypertension    HLD - Mixed hyperlipidemia    Primary generalized (osteo)arthritis    GERD - Gastro-esophageal reflux disease without esophagitis    Frequency of micturition    Vitamin D deficiency, unspecified    Other long term (current) drug therapy    Type 2 diabetes mellitus with diabetic polyneuropathy    Primary pulmonary hypertension        Immunizations:     Nov[employer] Flu P13 10/24/2007    Fluzone Quadrivalent (3+ years) 10/12/2018        Allergies:     Last Reviewed on 5/15/2020 03:45 PM by Maya Gilbert    Penicillins:          Current Medications:     Last Reviewed on 5/15/2020 03:46 PM by Maya Gilbert    atenoloL-chlorthalidone 100-25 mg oral tablet [TAKE ONE TABLET BY MOUTH DAILY]    VESIcare 5mg Tablet [Take 1 tablet(s) by mouth daily]    Omeprazole 20 mg oral capsule,delayed release (enteric coated) [1 capsule daily]    diclofenac sodium 75 mg oral tablet, delayed release (enteric coated) [TAKE ONE TABLET BY MOUTH TWICE A DAY WITH FOOD]    Vitamin D3 2,000 unit oral capsule [1 capsule daily]    traMADol 50 mg oral tablet [TAKE ONE TABLET BY MOUTH THREE TIMES A DAY AS NEEDED]    atorvastatin  20 mg oral tablet [TAKE ONE TABLET BY MOUTH DAILY]    metFORMIN 1,000 mg oral tablet [TAKE ONE TABLET BY MOUTH TWICE A DAY]    Silvadene 1% Cream [Apply sufficient amount to affected area bid  prn]    gabapentin 400 mg oral capsule [TAKE ONE CAPSULE BY MOUTH EVERY NIGHT AT BEDTIME]    Fluticasone Propionate 50 mcg/actuation Intranasal Spray, Suspension [1 spray each nostil daily]    B12  [OTC daily]        Objective:        Exams:     PHYSICAL EXAM:     GENERAL: vital signs recorded - well developed, well nourished;  well groomed;  no apparent distress;     EYES: extraocular movements intact; conjunctiva and cornea are normal;     E/N/T: OROPHARYNX:  normal mucosa, dentition, gingiva, and posterior pharynx;     RESPIRATORY: normal respiratory rate and pattern with no distress;     MUSCULOSKELETAL: normal overall tone     NEUROLOGIC: mental status: alert and oriented x 3;     PSYCHIATRIC: appropriate affect and demeanor; normal psychomotor function; normal speech pattern;         Assessment:         M15.0   Primary generalized (osteo)arthritis       Z79.899   Other long term (current) drug therapy       E11.42   Type 2 diabetes mellitus with diabetic polyneuropathy       I10   HTN - Essential (primary) hypertension       E78.2   HLD - Mixed hyperlipidemia       K21.9   GERD - Gastro-esophageal reflux disease without esophagitis           ORDERS:         Meds Prescribed:       [Refilled] traMADol 50 mg oral tablet [TAKE ONE TABLET BY MOUTH THREE TIMES A DAY AS NEEDED], #90 (ninety) tablets, Refills: 2 (two)         Radiology/Test Orders:       2022F  Dilated retinal eye exam w/interpret by ophthalmologist/optometrist documented/reviewed (DM)4  (In-House)              Lab Orders:       APPTO  Appointment need  (In-House)              Other Orders:         Screening mammogram results documented  (Send-Out)                      Plan:         Primary generalized (osteo)arthritisStable on current regimen.  Sx well  controlled.  No adverse effects.  She does require ongoing use of this controlled substance to function.  Prior tox screen appropriate; no tox run today due to COVID-19.  Gregg run today.  Refills needed.  RTC 3 months.    MIPS Vaccines Flu and Pneumonia updated in Shot record Screening mammomgram done within last 2 years and results in are chart Diabetic Eye Exam during this calendar year and results are in chart Telehealth: Verbal consent obtained for visit to occur via televideo conferencing; Staff, other than provider, present during telephone visit include Maya Gilbert RN     FOLLOW-UP: Schedule a follow-up visit in 4 months.:.  Medicare wellness 30 min with [employer]          Prescriptions:       [Refilled] traMADol 50 mg oral tablet [TAKE ONE TABLET BY MOUTH THREE TIMES A DAY AS NEEDED], #90 (ninety) tablets, Refills: 2 (two)           Orders:       2022F  Dilated retinal eye exam w/interpret by ophthalmologist/optometrist documented/reviewed (DM)4  (In-House)            APPTO  Appointment need  (In-House)              Screening mammogram results documented  (Send-Out)              Other long term (current) drug therapy    Controlled substance documentation: Gregg reviewed; prior drug screen consistent; consent is reviewed and signed and on the chart.  She is aware of risk of addiction on this medication, understands that she will need to follow up for a review every 3 months and her medications will be adjusted or decreased as deemed appropriate at each visit.  No history of drug or alcohol abuse.  No concerns about diversion or abuse. She denies side effects related to the medication.  She is aware that she may be called in for pill counts.  The dosing of this medication will be reviewed on a regular basis and reduced if possible..  Ongoing use of a controlled substance is necessary for this patient to have a normal quality of life         Type 2 diabetes mellitus with diabetic polyneuropathyShe is on  metformin for diabetes.  No refills needed.  She is UTD on eye exam (8/2019) done at [place].  She is UTD on foot exam (9/2019).  She is on a statin.        HTN - Essential (primary) hypertensionStable.  No refills needed.        HLD - Mixed hyperlipidemiaStable.  No refills needed.        GERD - Gastro-esophageal reflux disease without esophagitisStable.  No refills needed.            Patient Recommendations:        For  Primary generalized (osteo)arthritis:    Schedule a follow-up visit in 4 months.                APPOINTMENT INFORMATION:        Monday Tuesday Wednesday Thursday Friday Saturday Sunday            Time:___________________AM  PM   Date:_____________________             Charge Capture:         Primary Diagnosis:     M15.0  Primary generalized (osteo)arthritis           Orders:      70347  Office/outpatient visit; established patient, level 4  (In-House)            2022F  Dilated retinal eye exam w/interpret by ophthalmologist/optometrist documented/reviewed (DM)4  (In-House)            APPTO  Appointment need  (In-House)              Z79.899  Other long term (current) drug therapy     E11.42  Type 2 diabetes mellitus with diabetic polyneuropathy     I10  HTN - Essential (primary) hypertension     E78.2  HLD - Mixed hyperlipidemia     K21.9  GERD - Gastro-esophageal reflux disease without esophagitis

## 2021-05-18 NOTE — PROGRESS NOTES
Sarahi Palafox  1960     Office/Outpatient Visit    Visit Date: Mon, Dec 9, 2019 04:28 pm    Provider: Kia Juarez MD (Assistant: Anel Alonso MA)    Location: [facility]        Electronically signed by Kia Juarez MD on  12/09/2019 05:10:56 PM                             Subjective:        CC: Ms. Palafox is a 59 year old White female.  This is a follow-up visit.  check up; PT STATES SHE ISNT TAKING MAGNESIUM, LORATADINE        HPI: Sarahi is here today for follow-up on chronic issues.        She is on diclofenac and tramadol for generalized OA.  She has the most problems in the bilateral shoulders and knees.  She takes the tramadol 1-2 tabs daily.  She uses a cane to ambulate.  She follows with Dr. Cisneros but is not felt to be a surgical candidate currently.  Has tried Synvisc but it was not effective.  No adverse effects with the tramadol.  +Pain and numbness in the feet, appears to be due to diabetic peripheral neuropathy, for which she is on gabapentin.  She takes 300 mg QHS and that does help.  She still notices pain in the feet, worst at night when she is trying to sleep.        She is on metformin for diabetes.  She is reportedly UTD on eye exam (8/2019) done at [place].  She is UTD on foot exam (9/2019). She is on a statin.        She is on atenolol/chlorthalidone for HTN.  BP has been well controlled.  She does frequently have elevated BP here at the clinic.  No CP, SOB, palpitations.        She is on atorvastatin for HLD.  No myalgias or other adverse effects.        She is on Vesicare for urge incontinence.  Urinary sx well controlled.        She is on omeprazole for GERD.  No heartburn or indigestion.    ROS:     CONSTITUTIONAL:  Positive for fatigue.   Negative for fever.      EYES:  Negative for blurred vision.      E/N/T:  Positive for nasal congestion and frequent rhinorrhea.   Negative for diminished hearing.      CARDIOVASCULAR:  Negative for chest pain and palpitations.       RESPIRATORY:  Negative for recent cough and dyspnea.      GASTROINTESTINAL:  Negative for abdominal pain, constipation, diarrhea, nausea and vomiting.      MUSCULOSKELETAL:  Positive for arthralgias, joint stiffness and L > R foot pain.   Negative for myalgias.      NEUROLOGICAL:  Positive for paresthesia ( bilateral lower extremity ) and numbness bilateral fingertips.   Negative for weakness.      PSYCHIATRIC:  Negative for anxiety, depression and sleep disturbance.          Past Medical History / Family History / Social History:         Last Reviewed on 2019 04:47 PM by Kia Juarez    Past Medical History:                 PAST MEDICAL HISTORY             GYNECOLOGICAL HISTORY:        Problems with menstrual cycles include irregular frequency/duration.      Contraception: S/P tubal ligation;    Menarche occurred at age 12.  Hospitalizations: Never     Hyperlipidemia    Hypertension         PAST MEDICAL HISTORY             CURRENT MEDICAL PROVIDERS:    Ophthalmologist: Twila Subramanian         PAST MEDICAL HISTORY                 ADVANCED DIRECTIVES: None         PREVENTIVE HEALTH MAINTENANCE             BONE DENSITY: has never been done     COLORECTAL CANCER SCREENING: declines colorectal cancer screening, understands reason for testing     EYE EXAM: Diabetic Eye Exam during this calendar year and results are in chart was last done 2018, no retinopathy     Hepatitis C Medicare Screening: was last done 2017     MAMMOGRAM: Done within last 2 years and results in are chart was last done 2019 with normal results     PAP SMEAR: was last done 2018         Surgical History:          section: X 3;     high school had ligament repair left knee;         Family History:         Positive for Hypertension ( mother ) and Sudden Cardiac Death ( father -- 64--heart failure ).      Positive for Type 2 Diabetes ( mother ).  Father:  at age 64; Cause of death was COPD     Mother:   at age 88; Cause of death was sepsis;  Coronary Artery Disease; Hypertension;  Type 2 Diabetes         Social History:     Occupation: Unemployed     Marital Status:      Children: 3 children (ages 20, 17, 15 )     Ms. Palafox denies any current form of exercise.          Tobacco/Alcohol/Supplements:     Last Reviewed on 2019 04:47 PM by Kia Juarez    Tobacco: She has never smoked.          Alcohol:  Does not drink alcohol and never has.      Caffeine:  She admits to consuming caffeine via soda ( 2 servings per day ).          Substance Abuse History:     Last Reviewed on 2019 04:47 PM by Kia Juarez    None         Mental Health History:     Last Reviewed on 2019 04:47 PM by Kia Juarez    NEGATIVE         Communicable Diseases (eg STDs):     Last Reviewed on 2019 04:47 PM by Kia Juarez    Reportable health conditions; NEGATIVE         Current Problems:     Last Reviewed on 2019 04:18 PM by Kia Juarez    HTN - Essential (primary) hypertension    HLD - Mixed hyperlipidemia    Primary generalized (osteo)arthritis    GERD - Gastro-esophageal reflux disease without esophagitis    Frequency of micturition    Vitamin D deficiency, unspecified    Other long term (current) drug therapy    Type 2 diabetes mellitus with diabetic polyneuropathy    Primary pulmonary hypertension        Immunizations:     Nov[employer] Flu P13 10/24/2007    Fluzone Quadrivalent (3+ years) 10/12/2018        Allergies:     Last Reviewed on 2019 04:18 PM by Kia Juarez    Penicillins:          Current Medications:     Last Reviewed on 2019 04:18 PM by Kia Juarez    Loratadine 10mg Capsules [Take 1 capsule(s) by mouth daily]    atenolol-chlorthalidone 100-25 mg oral tablet [Take 1 tablet(s) by mouth daily]    VESIcare 5mg Tablet [Take 1 tablet(s) by mouth daily]    Omeprazole 20 mg oral capsule,delayed release (enteric coated) [1 capsule daily]    Diclofenac Sodium 75 mg  oral tablet, delayed release (enteric coated) [1 tab bid with food]    Vitamin D3 2,000 unit oral capsule [1 capsule daily]    Tramadol 50 mg oral tablet [1 tab TID PRN]    atorvastatin 20 mg oral tablet [1 tab daily]    metFORMIN 1,000 mg oral tablet [1 tab bid]    Magnesium Take one tablet once daily     Silvadene 1% Cream [Apply sufficient amount to affected area bid  prn]    gabapentin 400 mg oral capsule [TAKE ONE CAPSULE BY MOUTH EVERY NIGHT AT BEDTIME]    Fluticasone Propionate 50 mcg/actuation Intranasal Spray, Suspension [1 spray each nostil daily]        Objective:        Vitals:         Current: 12/9/2019 4:34:09 PM    Ht:  5 ft, 8 in;  Wt: 401 lbs;  BMI: 61.0T: 98.7 F (oral);  BP: 153/69 mm Hg (right arm, sitting);  P: 67 bpm (right arm (BP Cuff), sitting);  sCr: 0.82 mg/dL;  GFR: 117.81        Repeat:     5:1:9 PM  BP:   139/71mm Hg (right arm, sitting) 5:1:14 PM  P:   64bpm    Exams:     PHYSICAL EXAM:     GENERAL: vital signs recorded - obese;  well groomed;  no apparent distress;     EYES: extraocular movements intact; conjunctiva and cornea are normal; PERRLA;     E/N/T: OROPHARYNX:  normal mucosa, dentition, gingiva, and posterior pharynx;     RESPIRATORY: normal respiratory rate and pattern with no distress; normal breath sounds with no rales, rhonchi, wheezes or rubs;     CARDIOVASCULAR: normal rate; rhythm is regular;  no systolic murmur; no edema;     GASTROINTESTINAL: nontender; normal bowel sounds;     MUSCULOSKELETAL: normal gait; normal overall tone     PSYCHIATRIC: appropriate affect and demeanor; normal psychomotor function; normal speech pattern;         Lab/Test Results:         Urine temperature: confirmed (12/09/2019),     All urine drug screen levels confirmed negative: yes (12/09/2019),     Date and time of last pill: gabapentin 12-8-19 @ 9pm.. tramadol 12-9-19 @ 2:30 /ael (12/09/2019),     Performed by: AS (12/09/2019),     Collection Time: 1700 (12/09/2019),              Assessment:         E11.42   Type 2 diabetes mellitus with diabetic polyneuropathy       I10   HTN - Essential (primary) hypertension       E78.2   HLD - Mixed hyperlipidemia       M15.0   Primary generalized (osteo)arthritis       Z79.899   Other long term (current) drug therapy       K21.9   GERD - Gastro-esophageal reflux disease without esophagitis           ORDERS:         Lab Orders:       APPTO  Appointment need  (In-House)            [id number]  Drug test prsmv qual dir optical obs per day  (In-House)                      Plan:         Type 2 diabetes mellitus with diabetic polyneuropathyShe is on metformin for diabetes.  She is reportedly UTD on eye exam (8/2019) done at [place]; requesting records.  She is UTD on foot exam (9/2019). She is on a statin.  Going up on the gabapentin last visit did help with her neuropathic pain.   RTC 3 months for AWV.        FOLLOW-UP: Schedule a follow-up visit in 3 months.:.  Medicare wellness 30 min with [place]          Orders:       APPTO  Appointment need  (In-House)              HTN - Essential (primary) hypertensionBP at goal at home.  No refills needed.  Labs reviewed and UTD.        HLD - Mixed hyperlipidemiaStable.  No refills needed.  Labs reviewed and UTD.        Primary generalized (osteo)arthritisMariola is stable on her current regimen.  Pain is well controlled.  No adverse effects.  She does require ongoing use of this controlled substance to function.  Tox screen and Gregg run today.  No refills needed today.  RTC 3 months.        Other long term (current) drug therapy    Controlled substance documentation: Gregg reviewed; drug screen performed and appropriate; consent is reviewed and signed and on the chart.  She is aware of risk of addiction on this medication, understands that she will need to follow up for a review every 3 months and her medications will be adjusted or decreased as deemed appropriate at each visit.  No history of drug or alcohol abuse.  No  concerns about diversion or abuse. She denies side effects related to the medication.  She is aware that she may be called in for pill counts.  The dosing of this medication will be reviewed on a regular basis and reduced if possible..  Ongoing use of a controlled substance is necessary for this patient to have a normal quality of life           Orders:       [id number]  Drug test prsmv qual dir optical obs per day  (In-House)              GERD - Gastro-esophageal reflux disease without esophagitisStable.  No refills needed.            Patient Recommendations:        For  Type 2 diabetes mellitus with diabetic polyneuropathy:    Schedule a follow-up visit in 3 months.                APPOINTMENT INFORMATION:        Monday Tuesday Wednesday Thursday Friday Saturday Sunday            Time:___________________AM  PM   Date:_____________________             Charge Capture:         Primary Diagnosis:     E11.42  Type 2 diabetes mellitus with diabetic polyneuropathy           Orders:      04426  Office/outpatient visit; established patient, level 4  (In-House)            APPTO  Appointment need  (In-House)              I10  HTN - Essential (primary) hypertension     E78.2  HLD - Mixed hyperlipidemia     M15.0  Primary generalized (osteo)arthritis     Z79.899  Other long term (current) drug therapy           Orders:      [id number]  Drug test prsmv qual dir optical obs per day  (In-House)              K21.9  GERD - Gastro-esophageal reflux disease without esophagitis

## 2021-05-18 NOTE — PROGRESS NOTES
"Sarahi Palafox. 1960     Office/Outpatient Visit    Visit Date: Fri, Jul 13, 2018 01:15 pm    Provider: Kia Juarez MD (Assistant: Soni Nathan MA)    Location: [facility]        Electronically signed by Kia Juarez MD on  07/13/2018 07:12:00 PM                             SUBJECTIVE:        CC:     Ms. Palafox is a 57 year old White female.  follow up on meds & refills; DM, HTN, HLD         HPI: Sarahi is here today to f/u on chronic issues.        She is on diclofenac and tramadol for arthritis of the bilateral shoulders and knees.   The R knee is giving her the most trouble currently.  She uses a cane to ambulate.  \"I have to get something done.\"  She uses the diclofenac daily and then the tramadol prn, usually one or two tabs per day depending on what she is doing.  She follows with Dr. Cisneros but is not felt to be a surgical candidate currently.  She has tried Synvisc previously but it was not very effective.        She is on metformin for diabetes.  She is on a statin.  She has been working on her diabetic diet.  She is trying to substitute fruits for processed sweets.  Her A1c is down to 8.3 from 9.2 in March.  She is due for eye exam (6/2017).  She is due for foot exam (6/2017).         She is on atenolol/chlorthalidone for HTN.  BP has been well controlled.  No CP, SOB, palpitations.        She is on atorvastatin for HLD.  No myalgias.        She is on Vesicare for urge incontinence.          She is on omeprazole for GERD.  No reflux, heartburn or epigastric pain.     ROS:     CONSTITUTIONAL:  Positive for fatigue.   Negative for fever.      EYES:  Negative for blurred vision.      E/N/T:  Negative for diminished hearing and nasal congestion.      CARDIOVASCULAR:  Negative for chest pain and palpitations.      RESPIRATORY:  Negative for recent cough and dyspnea.      GASTROINTESTINAL:  Negative for abdominal pain, constipation, diarrhea, nausea and vomiting.      GENITOURINARY:  " Negative for urinary incontinence.      MUSCULOSKELETAL:  Positive for arthralgias and joint stiffness.   Negative for myalgias.      NEUROLOGICAL:  Negative for paresthesias and weakness.          PMH/FMH/SH:     Last Reviewed on 2018 01:31 PM by Kia Juarez    Past Medical History:             GYNECOLOGICAL HISTORY:        Problems with menstrual cycles include irregular frequency/duration.      Contraception: S/P tubal ligation;    Menarche occurred at age 12.    Last Pap was unsure; No abnormal Paps    Last mammogram was 7 to 8 years Hospitalizations: Never     Hyperlipidemia    Hypertension         PAST MEDICAL HISTORY             CURRENT MEDICAL PROVIDERS:    Ophthalmologist: Twila Subramanian         PAST MEDICAL HISTORY                 ADVANCED DIRECTIVES: None         PREVENTIVE HEALTH MAINTENANCE             BONE DENSITY: has never been done     COLORECTAL CANCER SCREENING: declines colorectal cancer screening, understands reason for testing     EYE EXAM: Diabetic Eye Exam during this calendar year and results are in chart was last done 2017 Gopi Subramanian     MAMMOGRAM: Done within last 2 years and results in are chart was last done 1/3/2018 with normal results     PAP SMEAR: was last done 2018         Surgical History:          section: X 3;      high school had ligament repair left knee;         Family History:         Positive for Hypertension ( mother ) and Sudden Cardiac Death ( father -- 64--heart failure ).      Positive for Type 2 Diabetes ( mother ).  Father:  at age 64; Cause of death was COPD     Mother:  at age 88; Cause of death was sepsis;  Coronary Artery Disease; Hypertension;  Type 2 Diabetes         Social History:     Occupation: Unemployed     Marital Status:      Children: 3 children (ages 20, 17, 15 )     Ms. Palafox denies any current form of exercise.          Tobacco/Alcohol/Supplements:     Last Reviewed on 2018 01:31 PM by  Kia Juarez    Tobacco: She has never smoked.          Alcohol:  Does not drink alcohol and never has.      Caffeine:  She admits to consuming caffeine via soda ( 2 servings per day ).          Substance Abuse History:     Last Reviewed on 7/13/2018 01:31 PM by Kia Juarez    None         Mental Health History:     Last Reviewed on 7/13/2018 01:31 PM by Kia Juarez    NEGATIVE         Communicable Diseases (eg STDs):     Last Reviewed on 7/13/2018 01:31 PM by Kia Juarez    Reportable health conditions; NEGATIVE             Current Problems:     Last Reviewed on 3/02/2018 04:06 PM by Kia Juarez    Type 2 diabetes     Use of high risk medications     Vitamin D deficiency, unspecified     Urinary frequency     Leukocytosis, unspecified     Pulmonary hypertension     GERD     Hyperlipidemia     Generalized osteoarthritis     Essential hypertension         Immunizations:     [employer] Flu P13 10/24/2007         Allergies:     Last Reviewed on 3/02/2018 04:06 PM by Kia Juarez    Penicillins:        Current Medications:     Last Reviewed on 3/02/2018 04:06 PM by Kia Juarez    Silvadene 1% Cream Apply sufficient amount to affected area bid  prn     VESIcare 5mg Tablet Take 1 tablet(s) by mouth daily     Atenolol/Chlorthalidone 100mg/25mg Tablet Take 1 tablet(s) by mouth daily     Omeprazole 20mg Capsules, Extended Release 1 capsule daily     Atorvastatin Calcium 20mg Tablet 1 tab daily     Diclofenac Sodium 75mg Tablets, Enteric Coated 1 tab bid with food     Tramadol 50mg Tablet 1 tab TID PRN     Vitamin D3 2,000IU Capsules 1 capsule daily     Metformin HCl 500mg Tablet 1 tab bid     Loratadine 10mg Capsules Take 1 capsule(s) by mouth daily     Magnesium Take one tablet once daily         OBJECTIVE:        Vitals:         Current: 7/13/2018 1:26:18 PM    Ht:  5 ft, 8 in;  Wt: 411.9 lbs;  BMI: 62.6    T: 98.8 F (oral);  BP: 146/68 mm Hg (right arm, sitting);  P: 57 bpm (finger clip, sitting);   sCr: 1.03 mg/dL;  GFR: 97.11        Repeat:     2:01:56 PM     BP:   130/70mm Hg (right arm, sitting)         Exams:     PHYSICAL EXAM:     GENERAL: vital signs recorded - obese;  well groomed;  no apparent distress;     EYES: extraocular movements intact; conjunctiva and cornea are normal; PERRLA;     E/N/T: OROPHARYNX:  normal mucosa, dentition, gingiva, and posterior pharynx;     RESPIRATORY: normal respiratory rate and pattern with no distress; normal breath sounds with no rales, rhonchi, wheezes or rubs;     CARDIOVASCULAR: normal rate; rhythm is regular;  no systolic murmur; no edema;     GASTROINTESTINAL: nontender; normal bowel sounds;     MUSCULOSKELETAL: normal gait; normal overall tone     PSYCHIATRIC: appropriate affect and demeanor; normal psychomotor function; normal speech pattern;     Left foot exam    Protective sensation using Monofilament test: Loss of protective sensation. Approximately 4 grams of force is applied without sensory awareness.    Vascular status: normal peripheral vascular exam with palpable dorsal pedal and posterior tibal pulses and brisk digital capillary refill    Skin is intact without sores or ulcers    Right foot exam    Protective sensation using Monofilament test: Loss of protective sensation. Approximately 4 grams of force is applied without sensory awareness.    Vascular status: normal peripheral vascular exam with palpable dorsal pedal and posterior tibal pulses and brisk digital capillary refill    Skin is intact without sores or ulcers         Lab/Test Results:             Urine temperature:  confirmed (07/13/2018),     All urine drug screen levels confirmed negative:  yes (07/13/2018),     Date and time of last pill:  Tramadol 7/13/18@12am (07/13/2018),     Performed by:  tls (07/13/2018),     Collection Time:  1345 (07/13/2018),             ASSESSMENT           715.00   M15.0  Generalized osteoarthritis              DDx:     V58.69   Z79.899  Use of high risk  medications              DDx:     250.00   E11.9  Type 2 diabetes              DDx:     401.1   I10  Essential hypertension              DDx:     272.4   E78.2  Hyperlipidemia              DDx:     530.81   K21.9  GERD              DDx:         ORDERS:         Meds Prescribed:       Refill of: Tramadol 50mg Tablet 1 tab TID PRN  #90 (Ninety) tablet(s) Refills: 2       Refill of: Metformin HCl 500mg Tablet 2 po q am and 1 po q evening  #270 (Two [place] and Seventy) tablet(s) Refills: 1         Lab Orders:       [id number]  Drug test prsmv qual dir optical obs per day  (In-House)         APPTO  Appointment need  (In-House)           Procedures Ordered:       REFER  Referral to Specialist or Other Facility  (Send-Out)           Other Orders:       2028F  Foot examination performed (includes examination through visual inspection, sensory exam with monofi  (In-House)                   PLAN:          Generalized osteoarthritis Stable on diclofenac and tramadol.  She has been having more pain in the R knee lately.  Recommend she get back in with Dr. Cisneros to discuss options.  In the meantime, will keep her on the meds.  No adverse effects.  She does require ongoing use of this controlled substance to function.  Tox screen and Gregg run today.  RTC 3 months.         FOLLOW-UP: Schedule a follow-up visit in 3 months..  f/u controlled substances with [place]           Prescriptions:       Refill of: Tramadol 50mg Tablet 1 tab TID PRN  #90 (Ninety) tablet(s) Refills: 2           Orders:       APPTO  Appointment need  (In-House)             Patient Education Handouts:       [facility] [place] Medication Compliance           Use of high risk medications     Controlled substance documentation: Gregg reviewed; drug screen performed and appropriate; consent is reviewed and signed and on the chart.  She is aware of risk of addiction on this medication, understands that she will need to follow up for a review every 3 months and her medications will  be adjusted or decreased as deemed appropriate at each visit.  No history of drug or alcohol abuse.  No concerns about diversion or abuse. She denies side effects related to the medication.  She is aware that she may be called in for pill counts.  The dosing of this medication will be reviewed on a regular basis and reduced if possible..  Ongoing use of a controlled substance is necessary for this patient to have a normal quality of life Drug screen           Orders:       [id number]  Drug test prsmv qual dir optical obs per day  (In-House)            Type 2 diabetes She is on metformin for diabetes.  Her A1c is improved, now at 8.3 from 9.2.  Will increase her metformin to 2 tabs QAM and 1 tab QHS.  She will continue to work on her diet.  She is on a statin.  She is due for eye exam (6/2017); ordered.  Foot exam today shows loss of protective sensation.  RTC 3 months.         REFERRALS:  Referral initiated to an ophthalmologist ( at [place]; for evaluation of diabetic eye exam ).            Prescriptions:       Refill of: Metformin HCl 500mg Tablet 2 po q am and 1 po q evening  #270 (Two [place] and Seventy) tablet(s) Refills: 1           Orders:       REFER  Referral to Specialist or Other Facility  (Send-Out)            Essential hypertension BP at goal.  No refills needed.  Labs UTD.          Hyperlipidemia No refills needed.  Labs UTD.          GERD Stable.  No refills needed.             Other Orders:       2028F  Foot examination performed (includes examination through visual inspection, sensory exam with monofi  (In-House)           Patient Recommendations:        For  Generalized osteoarthritis:     Schedule a follow-up visit in 3 months.                APPOINTMENT INFORMATION:        Monday Tuesday Wednesday Thursday Friday Saturday Sunday            Time:___________________AM  PM   Date:_____________________             CHARGE CAPTURE           **Please note: ICD descriptions below are intended  for billing purposes only and may not represent clinical diagnoses**        Primary Diagnosis:         715.00 Generalized osteoarthritis            M15.0    Primary generalized (osteo)arthritis              Orders:          86817   Office/outpatient visit; established patient, level 4  (In-House)             APPTO   Appointment need  (In-House)           V58.69 Use of high risk medications            Z79.899    Other long term (current) drug therapy              Orders:          53467   Drug test prsmv qual dir optical obs per day  (In-House)           250.00 Type 2 diabetes            E11.9    Type 2 diabetes mellitus without complications    401.1 Essential hypertension            I10    Essential (primary) hypertension    272.4 Hyperlipidemia            E78.2    Mixed hyperlipidemia    530.81 GERD            K21.9    Gastro-esophageal reflux disease without esophagitis        Other Orders:           2028F   Foot examination performed (includes examination through visual inspection, sensory exam with monofi  (In-House)

## 2021-05-18 NOTE — PROGRESS NOTES
Sarahi PalafoxValentín 1960     Office/Outpatient Visit    Visit Date: Fri, Oct 12, 2018 02:51 pm    Provider: Kia Juarez MD (Assistant: Leesa Watkins MA)    Location: [facility]        Electronically signed by Kia Juarez MD on  10/12/2018 08:14:59 PM                             SUBJECTIVE:        CC:     Ms. Palafox is a 58 year old White female.  This is a follow-up visit.  patient presents today for three month follow up;         HPI: Sarahi is here today for routine f/u of chronic issues.        She is on diclofenac and tramadol for arthritis of the bilateral shoulders and knees.  She uses the tramadol daily, 1-2 tabs per day typically.  She uses a cane to ambulate.  She follows with Dr. Cisneros but is not felt to be a surgical candidate currently.  She has tried Synvisc previously but it was not very effective.  No adverse effects of the meds.  She has been having a lot of pain and numbness in the bilateral feet.  She likes to lay on the left side but that causes the left foot to start aching and get cold.        She is on metformin for diabetes, increased at last visit to 1000 mg QAM and 500 mg QHS.  Her A1c came back at 8.3 in July, at which time the increase was made.  She is on a statin.  She has been working on her diabetic diet.  She is UTD on eye exam (8/2018).  She is UTD on foot exam (7/2018).         She is on atenolol/chlorthalidone for HTN.  BP has been well controlled.  She does frequently have elevated BP here at the clinic.  No CP, SOB, palpitations.        She is on atorvastatin for HLD.  No myalgias or other adverse effects.        She is on Vesicare for urge incontinence.          She is on omeprazole for GERD.  No reflux, heartburn or epigastric pain.     ROS:     CONSTITUTIONAL:  Positive for fatigue.   Negative for fever.      EYES:  Negative for blurred vision.      E/N/T:  Negative for diminished hearing and nasal congestion.      CARDIOVASCULAR:  Negative for chest pain  and palpitations.      RESPIRATORY:  Negative for recent cough and dyspnea.      GASTROINTESTINAL:  Negative for abdominal pain, constipation, diarrhea, nausea and vomiting.      GENITOURINARY:  Negative for urinary incontinence.      MUSCULOSKELETAL:  Positive for arthralgias, joint stiffness and L > R foot pain.   Negative for myalgias.      NEUROLOGICAL:  Positive for paresthesia ( bilateral lower extremity ).   Negative for weakness.          PMH/FMH/SH:     Last Reviewed on 10/12/2018 03:00 PM by Kia Juarez    Past Medical History:             GYNECOLOGICAL HISTORY:        Problems with menstrual cycles include irregular frequency/duration.      Contraception: S/P tubal ligation;    Menarche occurred at age 12.    Last Pap was unsure; No abnormal Paps    Last mammogram was 7 to 8 years Hospitalizations: Never     Hyperlipidemia    Hypertension         PAST MEDICAL HISTORY             CURRENT MEDICAL PROVIDERS:    Ophthalmologist: Twila Subramanian         PAST MEDICAL HISTORY                 ADVANCED DIRECTIVES: None         PREVENTIVE HEALTH MAINTENANCE             BONE DENSITY: has never been done     COLORECTAL CANCER SCREENING: declines colorectal cancer screening, understands reason for testing     EYE EXAM: Diabetic Eye Exam during this calendar year and results are in chart was last done 2018     MAMMOGRAM: Done within last 2 years and results in are chart was last done 1/3/2018 with normal results     PAP SMEAR: was last done 2018         Surgical History:          section: X 3;      high school had ligament repair left knee;         Family History:         Positive for Hypertension ( mother ) and Sudden Cardiac Death ( father -- 64--heart failure ).      Positive for Type 2 Diabetes ( mother ).  Father:  at age 64; Cause of death was COPD     Mother:  at age 88; Cause of death was sepsis;  Coronary Artery Disease; Hypertension;  Type 2 Diabetes         Social  History:     Occupation: Unemployed     Marital Status:      Children: 3 children (ages 20, 17, 15 )     Ms. Palafox denies any current form of exercise.          Tobacco/Alcohol/Supplements:     Last Reviewed on 10/12/2018 03:00 PM by Kia Juarez    Tobacco: She has never smoked.          Alcohol:  Does not drink alcohol and never has.      Caffeine:  She admits to consuming caffeine via soda ( 2 servings per day ).          Substance Abuse History:     Last Reviewed on 10/12/2018 03:00 PM by Kia Juarez    None         Mental Health History:     Last Reviewed on 10/12/2018 03:00 PM by Kia Juarez    NEGATIVE         Communicable Diseases (eg STDs):     Last Reviewed on 10/12/2018 03:00 PM by Kia Juarez    Reportable health conditions; NEGATIVE             Current Problems:     Last Reviewed on 7/13/2018 01:31 PM by Kia Juarez    Type 2 diabetes     Use of high risk medications     Vitamin D deficiency, unspecified     Urinary frequency     Leukocytosis, unspecified     Pulmonary hypertension     GERD     Hyperlipidemia     Generalized osteoarthritis     Essential hypertension         Immunizations:     [employer] Flu P13 10/24/2007         Allergies:     Last Reviewed on 7/13/2018 01:31 PM by Kia Juarez    Penicillins:        Current Medications:     Last Reviewed on 7/13/2018 01:31 PM by Kia Juarez    Omeprazole 20mg Capsules, Extended Release 1 capsule daily     VESIcare 5mg Tablet Take 1 tablet(s) by mouth daily     Atorvastatin Calcium 20mg Tablet 1 tab daily     Diclofenac Sodium 75mg Tablets, Enteric Coated 1 tab bid with food     Tramadol 50mg Tablet 1 tab TID PRN     Silvadene 1% Cream Apply sufficient amount to affected area bid  prn     Atenolol/Chlorthalidone 100mg/25mg Tablet Take 1 tablet(s) by mouth daily     Vitamin D3 2,000IU Capsules 1 capsule daily     Glucose Reagent Blood Test Strips  Reagent Strips Check BS 1-2 times daily with Accu check gonzales plus Dx E11.9      Loratadine 10mg Capsules Take 1 capsule(s) by mouth daily     Magnesium Take one tablet once daily         OBJECTIVE:        Vitals:         Current: 10/12/2018 2:56:22 PM    Ht:  5 ft, 8 in;  Wt: 402.6 lbs;  BMI: 61.2    T: 98.1 F (oral);  BP: 160/54 mm Hg (right arm, sitting);  P: 67 bpm (right arm (BP Cuff), sitting);  sCr: 1.03 mg/dL;  GFR: 95.06        Exams:     PHYSICAL EXAM:     GENERAL: vital signs recorded - obese;  well groomed;  no apparent distress;     EYES: extraocular movements intact; conjunctiva and cornea are normal; PERRLA;     E/N/T: OROPHARYNX:  normal mucosa, dentition, gingiva, and posterior pharynx;     RESPIRATORY: normal respiratory rate and pattern with no distress; normal breath sounds with no rales, rhonchi, wheezes or rubs;     CARDIOVASCULAR: normal rate; rhythm is regular;  no systolic murmur; no edema;     GASTROINTESTINAL: nontender; normal bowel sounds;     MUSCULOSKELETAL: normal gait; normal overall tone     PSYCHIATRIC: appropriate affect and demeanor; normal psychomotor function; normal speech pattern;         Lab/Test Results:             Urine temperature:  confirmed (10/12/2018),     All urine drug screen levels confirmed negative:  yes (10/12/2018),     Date and time of last pill:  Tramadol 10/12/18 @ 12AM/ AS (10/12/2018),     Performed by:  SC[facility] (10/12/2018),     Collection Time:  1545 (10/12/2018),     Hemoglobin A1c:  8.1 (10/12/2018),     Performed by::  tls (10/12/2018),             Procedures:     Vaccination against other viral diseases, Influenza     1. Influenza, seasonal PF (children 3 years to adult): 0.5 ml unit dose given IM in the left upper arm; administered by AS;  lot number [id number]; expires 06/30/19 Regarding contraindications to an Influenza vaccine: Denies moderate/severe illness with/without fever; serious reaction to eggs, egg proteins, gentamicin, gelatin, arginine, neomycin or polymixin; serious reaction after recieving previous influenza  vaccines; and history of Guillain-Wilkinson Syndrome.              ASSESSMENT           250.00   E11.9  Type 2 diabetes              DDx:     715.00   M15.0  Generalized osteoarthritis              DDx:     356.9   E11.42  Peripheral neuropathy              DDx:     V58.69   Z79.899  Use of high risk medications              DDx:     401.1   I10  Essential hypertension              DDx:     272.4   E78.2  Hyperlipidemia              DDx:     788.41   R35.0  Urinary frequency              DDx:     V04.81   Z23  Vaccination against other viral diseases, Influenza              DDx:         ORDERS:         Meds Prescribed:       Gabapentin 100mg Capsules 1 capsule HS  #30 (Thirty) capsule(s) Refills: 2         Lab Orders:       82815  Drug test prsmv qual dir optical obs per day  (In-House)         48045*  Hgb A1c fast lab  (In-House)         APPTO  Appointment need  (In-House)           Procedures Ordered:       REFER  Referral to Specialist or Other Facility  (Send-Out)         88644  Immunization administration; one vaccine  (In-House)         44108  Collection of capillary blood specimen (eg, finger, heel, ear stick)  (In-House)           Other Orders:       73575  Influenza virus vaccine, quadrivalent, split virus, preservative free 3 years of age & older  (In-House)                   PLAN:          Type 2 diabetes She is on metformin for diabetes, increased at last visit to 1000 mg QAM and 500 mg QHS.  Checking A1c today  She is on a statin.  She is UTD on eye exam (8/2018).  She is UTD on foot exam (7/2018).   RTC 3 months.     LABORATORY:  Labs ordered to be performed today include Hgb A1c inhouse fast lab.      FOLLOW-UP: Schedule a follow-up visit in 3 months..  f/u generalized OA with [place]           Orders:       10045*  Hgb A1c fast lab  (In-House)         APPTO  Appointment need  (In-House)         03510  Collection of capillary blood specimen (eg, finger, heel, ear stick)  (In-House)             Patient  Education Handouts:       [facility] [place]           Generalized osteoarthritis Her knees have been giving her more trouble, although the diclofenac and tramadol do keep her functional.  She does require ongoing use of this controlled substance.  No adverse effects.  Tox screen and Gregg run today.  No refills needed.  She would like to get back in with Dr. Cisneros to see about whether there are more options aside from surgery.  RTC 3 months.         REFERRALS:  Referral initiated to an orthopedist ( Dr. Balwinder Cisneros; for evaluation of bilateral knee OA ).            Orders:       REFER  Referral to Specialist or Other Facility  (Send-Out)            Peripheral neuropathy +diabetic peripheral neuropathy.  Starting gabapentin as below.  Controlled substance monitoring as above.           Prescriptions:       Gabapentin 100mg Capsules 1 capsule HS  #30 (Thirty) capsule(s) Refills: 2          Use of high risk medications     Controlled substance documentation: Gregg reviewed; drug screen performed and appropriate; consent is reviewed and signed and on the chart.  She is aware of risk of addiction on this medication, understands that she will need to follow up for a review every 3 months and her medications will be adjusted or decreased as deemed appropriate at each visit.  No history of drug or alcohol abuse.  No concerns about diversion or abuse. She denies side effects related to the medication.  She is aware that she may be called in for pill counts.  The dosing of this medication will be reviewed on a regular basis and reduced if possible..  Ongoing use of a controlled substance is necessary for this patient to have a normal quality of life           Orders:       29194  Drug test prsmv qual dir optical obs per day  (In-House)            Essential hypertension BP has been elevated when she is here but is doing fine at home when she checks.  No changes to meds today.  No refills needed.           Hyperlipidemia Stable.  No refills needed.          Urinary frequency Stable.  No refills needed.          Vaccination against other viral diseases, Influenza         IMMUNIZATIONS given today: Influenza Quadrivalent psv free shot 3 & up.            Orders:       04369  Influenza virus vaccine, quadrivalent, split virus, preservative free 3 years of age & older  (In-House)         63978  Immunization administration; one vaccine  (In-House)               Patient Recommendations:        For  Type 2 diabetes:     Schedule a follow-up visit in 3 months.                APPOINTMENT INFORMATION:        Monday Tuesday Wednesday Thursday Friday Saturday Sunday            Time:___________________AM  PM   Date:_____________________             CHARGE CAPTURE           **Please note: ICD descriptions below are intended for billing purposes only and may not represent clinical diagnoses**        Primary Diagnosis:         250.00 Type 2 diabetes            E11.9    Type 2 diabetes mellitus without complications              Orders:          55043   Office/outpatient visit; established patient, level 4  (In-House)             43941*   Hgb A1c fast lab  (In-House)             APPTO   Appointment need  (In-House)             31417   Collection of capillary blood specimen (eg, finger, heel, ear stick)  (In-House)           715.00 Generalized osteoarthritis            M15.0    Primary generalized (osteo)arthritis    356.9 Peripheral neuropathy            E11.42    Type 2 diabetes mellitus with diabetic polyneuropathy    V58.69 Use of high risk medications            Z79.899    Other long term (current) drug therapy              Orders:          29231   Drug test prsmv qual dir optical obs per day  (In-House)           401.1 Essential hypertension            I10    Essential (primary) hypertension    272.4 Hyperlipidemia            E78.2    Mixed hyperlipidemia    788.41 Urinary frequency            R35.0    Frequency of  micturition    V04.81 Vaccination against other viral diseases, Influenza            Z23    Encounter for immunization              Orders:          03268   Influenza virus vaccine, quadrivalent, split virus, preservative free 3 years of age & older  (In-House)             92927   Immunization administration; one vaccine  (In-House)

## 2021-05-18 NOTE — PROGRESS NOTES
"Sarahi Palafox  1960     Office/Outpatient Visit    Visit Date: Fri, May 7, 2021 04:04 pm    Provider: Kia Juarez MD (Assistant: Leesa Watkins MA)    Location: [facility]        Electronically signed by Kia Juarez MD on  05/07/2021 05:35:32 PM                             Subjective:        CC: Ms. Palafox is a 60 year old White female.  Patient presents today for three month follow up (not taking B12);         HPI: Sarahi is here today for routine follow-up on chronic issues.        She has an acute issue of some L-sided nasal congestion.  She blows and blows and it \"feels like something is flapping up there\" but nothing comes out.  She has had some nose-bleeds on that side.        She is on tramadol and diclofenac for generalized OA.  Pain is worst in the bilateral shoulders and knees.  She uses the tramadol 1-2 tabs per day.  She uses a cane to ambulate.  She follows with Dr. Cisneros but is not felt to be a surgical candidate currently.  Has tried Synvisc but this did not help.  No adverse effects.         She is on metformin and glimepiride for diabetes.   She is UTD on eye exam (3/2021) done at [facility].  She is UTD on foot exam (9/2020). She is on a statin.   She is on gabapentin for diabetic peripheral neuropathy.  Pain is fairly well controlled.  No adverse effects.        She is on atenolol/chlorthalidone for HTN.  BP has been well controlled at home.  No CP, SOB, palpitations.        She is on atorvastatin for HLD.  No myalgias or other adverse effects.         She is on Vesicare for urge incontinence.  Urinary sx well controlled.        She is on omeprazole for GERD.  No reflux or indigestion.        She uses her CPAP religiously every night.    ROS:     CONSTITUTIONAL:  Positive for fatigue.   Negative for fever.      EYES:  Negative for blurred vision.      E/N/T:  Positive for nasal congestion.      CARDIOVASCULAR:  Negative for chest pain and palpitations.      " RESPIRATORY:  Negative for recent cough and dyspnea.      GASTROINTESTINAL:  Negative for abdominal pain, constipation, diarrhea, nausea and vomiting.      MUSCULOSKELETAL:  Positive for arthralgias and (worst in shoulders) joint stiffness.   Negative for myalgias.      NEUROLOGICAL:  Positive for paresthesia ( bilateral lower extremity ) and numbness bilateral fingertips.   Negative for weakness.          Past Medical History / Family History / Social History:         Last Reviewed on 2021 05:29 PM by Kia Juarez    Past Medical History:                 PAST MEDICAL HISTORY             GYNECOLOGICAL HISTORY:        Problems with menstrual cycles include irregular frequency/duration.      Contraception: S/P tubal ligation;    Menarche occurred at age 12.  Hospitalizations: Never     Hyperlipidemia    Hypertension         PAST MEDICAL HISTORY             CURRENT MEDICAL PROVIDERS:    Ophthalmologist: Twila Subramanian         PAST MEDICAL HISTORY                 ADVANCED DIRECTIVES: None         PREVENTIVE HEALTH MAINTENANCE             BONE DENSITY: has never been done     COLORECTAL CANCER SCREENING: declines colorectal cancer screening, understands reason for testing     EYE EXAM: Diabetic Eye Exam during this calendar year and results are in chart was last done 3/12/2021 mild proliferative DR     Hepatitis C Medicare Screening: was last done 2017     MAMMOGRAM: Done within last 2 years and results in are chart was last done 2021 with normal results     PAP SMEAR: was last done 2018         Surgical History:          section: X 3;     high school had ligament repair left knee;         Family History:         Positive for Hypertension ( mother ) and Sudden Cardiac Death ( father -- 64--heart failure ).      Positive for Type 2 Diabetes ( mother ).  Father:  at age 64; Cause of death was COPD     Mother:  at age 88; Cause of death was sepsis;  Coronary Artery Disease;  Hypertension;  Type 2 Diabetes         Social History:     Occupation: Unemployed     Marital Status:      Children: 3 children (ages 20, 17, 15 )     Ms. Palafox denies any current form of exercise.          Tobacco/Alcohol/Supplements:     Last Reviewed on 5/07/2021 05:29 PM by Kia Juarez    Tobacco: She has never smoked.          Alcohol:  Does not drink alcohol and never has.      Caffeine:  She admits to consuming caffeine via soda ( 2 servings per day ).          Substance Abuse History:     Last Reviewed on 5/07/2021 05:29 PM by Kia Juarez    None         Mental Health History:     Last Reviewed on 5/07/2021 05:29 PM by Kia Juarez    NEGATIVE         Communicable Diseases (eg STDs):     Last Reviewed on 5/07/2021 05:29 PM by Kia Juarez    Reportable health conditions; NEGATIVE         Current Problems:     Last Reviewed on 9/04/2020 03:52 PM by Kia Juarez    HTN - Essential (primary) hypertension    Primary generalized (osteo)arthritis    HLD - Mixed hyperlipidemia    GERD - Gastro-esophageal reflux disease without esophagitis    Frequency of micturition    Vitamin D deficiency, unspecified    Other long term (current) drug therapy    Type 2 diabetes mellitus with diabetic polyneuropathy    Primary pulmonary hypertension    Pain in right shoulder    Pain in left shoulder    Obstructive sleep apnea (adult) (pediatric)    Encounter for general adult medical examination without abnormal findings    Encounter for screening mammogram for malignant neoplasm of breast        Immunizations:     [employer] Flu P13 10/24/2007    Fluzone Quadrivalent (3+ years) 10/12/2018        Allergies:     Last Reviewed on 5/07/2021 04:08 PM by Leesa Watkins    Penicillins:          Current Medications:     Last Reviewed on 5/07/2021 04:08 PM by Leesa Watkins    B12  [OTC daily]    atenoloL-chlorthalidone 100-25 mg oral tablet [TAKE ONE TABLET BY MOUTH DAILY]    solifenacin 5 mg oral tablet [TAKE ONE  TABLET BY MOUTH DAILY]    omeprazole 20 mg oral capsule,delayed release (enteric coated) [TAKE ONE CAPSULE BY MOUTH DAILY]    diclofenac sodium 75 mg oral tablet, delayed release (enteric coated) [TAKE ONE TABLET BY MOUTH TWICE A DAY WITH FOOD]    Vitamin D3 2,000 unit oral capsule [1 capsule daily]    traMADol 50 mg oral tablet [TAKE ONE TABLET BY MOUTH THREE TIMES A DAY AS NEEDED]    atorvastatin 20 mg oral tablet [TAKE ONE TABLET BY MOUTH DAILY]    metFORMIN 1,000 mg oral tablet [TAKE ONE TABLET BY MOUTH TWICE A DAY]    Silvadene 1% Cream [Apply sufficient amount to affected area bid  prn]    Accu-Chek Meri Plus test strips  [CHECK BLOOD SUGAR ONCE OR TWICE DAILY]    gabapentin 400 mg oral capsule [TAKE ONE CAPSULE BY MOUTH EVERY NIGHT AT BEDTIME]    fluticasone propionate 50 mcg/actuation Intranasal Spray, Suspension [SPRAY ONE SPRAY IN EACH NOSTRIL ONCE DAILY]    Accu-Chek Softclix Lancets  [Use as directed to check blood sugar one -two times a day]    glimepiride 1 mg oral tablet [TAKE ONE TABLET BY MOUTH DAILY]        Objective:        Vitals:         Current: 5/7/2021 4:10:17 PM    Ht:  5 ft, 8 in;  Wt: 395 lbs;  BMI: 60.1T: 97.3 F (temporal);  BP: 147/50 mm Hg (left arm, sitting);  P: 61 bpm (left arm (BP Cuff), sitting);  sCr: 1.1 mg/dL;  GFR: 86.23        Exams:     PHYSICAL EXAM:     GENERAL: vital signs recorded - obese;  well groomed;  no apparent distress;     EYES: extraocular movements intact; conjunctiva and cornea are normal; PERRLA;     E/N/T: NOSE: nasal mucosa is edematous;  left nasal polyp; white pea-sized polyp appears attached to lateral aspect of L nare, nontender, minimal bleeding from anterior aspect; OROPHARYNX: posterior pharynx shows cobblestone appearance;     RESPIRATORY: normal respiratory rate and pattern with no distress; normal breath sounds with no rales, rhonchi, wheezes or rubs;     CARDIOVASCULAR: normal rate; rhythm is regular;  no systolic murmur; no edema;      GASTROINTESTINAL: nontender; normal bowel sounds;     MUSCULOSKELETAL: normal gait; normal overall tone     PSYCHIATRIC: appropriate affect and demeanor; normal psychomotor function; normal speech pattern;         Lab/Test Results:         Urine temperature: unable to confirm temp (05/07/2021),     All urine drug screen levels confirmed negative: yes (05/07/2021),     Date and time of last pill: Gabapentin 05/06/2021 @ 2300, Tramadol 05/07/2021 @ 0800/AS (05/07/2021),     Performed by: tls (05/07/2021),     Collection Time: 1650 (05/07/2021),             Assessment:         M15.0   Primary generalized (osteo)arthritis       Z79.899   Other long term (current) drug therapy       J33.9   Nasal polyp, unspecified       E11.42   Type 2 diabetes mellitus with diabetic polyneuropathy       I10   HTN - Essential (primary) hypertension       E78.2   HLD - Mixed hyperlipidemia       K21.9   GERD - Gastro-esophageal reflux disease without esophagitis       G47.33   Obstructive sleep apnea (adult) (pediatric)           ORDERS:         Lab Orders:       [id number]  Drug test prsmv qual dir optical obs per day  (In-House)            APPTO  Appointment need  (In-House)              Procedures Ordered:       REFER  Referral to Specialist or Other Facility  (Send-Out)                      Plan:         Primary generalized (osteo)arthritisStable on current regimen.  Sx well controlled.  No adverse effects.  She does require ongoing use of this controlled substance to function.  Tox screen and Gregg run today.  No refills needed.  RTC 3 months.    MIPS Vaccines Flu and Pneumonia updated in Shot record     FOLLOW-UP: Schedule a follow-up visit in 3 months.:.  f/u OA with [facility]          Orders:       APPTO  Appointment need  (In-House)                Patient Education Handouts:       Exercises - Treatment for Low Back Pain    PTC          Other long term (current) drug therapy    Controlled substance documentation: Gregg reviewed; drug  screen performed and appropriate; consent is reviewed and signed and on the chart.  She is aware of risk of addiction on this medication, understands that she will need to follow up for a review every 3 months and her medications will be adjusted or decreased as deemed appropriate at each visit.  No history of drug or alcohol abuse.  No concerns about diversion or abuse. She denies side effects related to the medication.  She is aware that she may be called in for pill counts.  The dosing of this medication will be reviewed on a regular basis and reduced if possible..  Ongoing use of a controlled substance is necessary for this patient to have a normal quality of life           Orders:       07240  Drug test prsmv qual dir optical obs per day  (In-House)              Nasal polyp, unspecifiedReferral placed to Dr. Tavera for evaluation of bleeding nasal polyp, L nare.  Continue Flonase and add nasal saline daily.  Insert nasal spray gently into nare to avoid further trauma.        REFERRALS:  Referral initiated to an E/N/T ( Dr. Bonifacio Tavera, [facility]; for evaluation of nasal polyp L nare ).            Orders:       REFER  Referral to Specialist or Other Facility  (Send-Out)              Type 2 diabetes mellitus with diabetic polyneuropathyShe is on metformin and glimepiride for diabetes.  No refills needed.  She is UTD on eye exam (3/2021) done at [place].  She is UTD on foot exam (9/2020). She is on a statin.   She is on gabapentin for diabetic peripheral neuropathy.  Pain is fairly well controlled.  No adverse effects.        HTN - Essential (primary) hypertensionBP at goal.  Labs reviewed and UTD.  No refills needed.        HLD - Mixed hyperlipidemiaStable.  Labs reviewed and UTD.  No refills needed.        GERD - Gastro-esophageal reflux disease without esophagitisStable.  No refills needed.        Obstructive sleep apnea (adult) (pediatric)Uses CPAP regularly.            Patient Recommendations:        For   Primary generalized (osteo)arthritis:    Schedule a follow-up visit in 3 months.                APPOINTMENT INFORMATION:        Monday Tuesday Wednesday Thursday Friday Saturday Sunday            Time:___________________AM  PM   Date:_____________________             Charge Capture:         Primary Diagnosis:     M15.0  Primary generalized (osteo)arthritis           Orders:      40270  Office/outpatient visit; established patient, level 4  (In-House)            APPTO  Appointment need  (In-House)              Z79.899  Other long term (current) drug therapy           Orders:      77562  Drug test prsmv qual dir optical obs per day  (In-House)              J33.9  Nasal polyp, unspecified     E11.42  Type 2 diabetes mellitus with diabetic polyneuropathy     I10  HTN - Essential (primary) hypertension     E78.2  HLD - Mixed hyperlipidemia     K21.9  GERD - Gastro-esophageal reflux disease without esophagitis     G47.33  Obstructive sleep apnea (adult) (pediatric)

## 2021-05-18 NOTE — PROGRESS NOTES
Sarahi PalafoxValentín 1960     Office/Outpatient Visit    Visit Date: Fri, Sep 6, 2019 03:35 pm    Provider: Kia Juarez MD (Assistant: Leesa Watkins MA)    Location: [facility]        Electronically signed by Kia Juarez MD on  09/06/2019 05:17:03 PM                             SUBJECTIVE:        CC:     Ms. Palafox is a 59 year old White female.  Patient presents today for four month follow up;         HPI: Sarahi is here today for follow-up on chronic issues.        She is on diclofenac and tramadol for generalized OA.  She has the most problems in the bilateral shoulders and knees.  She takes the tramadol 1-2 tabs daily.  She uses a cane to ambulate.  She follows with Dr. Cisneros but is not felt to be a surgical candidate currently.  Has tried Synvisc but it was not effective.  No adverse effects with the tramadol.  +Pain and numbness in the feet, appears to be due to diabetic peripheral neuropathy, for which she is on gabapentin.  She takes 300 mg QHS and that does help.  She still notices pain in the feet, worst at night when she is trying to sleep.        She is on metformin for diabetes.  She is due for eye exam (8/2018).  She is due for foot exam (7/2018). She is on a statin.        She is on atenolol/chlorthalidone for HTN.  BP has been well controlled.  She does frequently have elevated BP here at the clinic.  No CP, SOB, palpitations.        She is on atorvastatin for HLD.  No myalgias or other adverse effects.        She is on Vesicare for urge incontinence.  Urinary sx well controlled.        She is on omeprazole for GERD.  No heartburn or indigestion.     ROS:     CONSTITUTIONAL:  Positive for fatigue.   Negative for fever.      EYES:  Negative for blurred vision.      E/N/T:  Positive for nasal congestion and frequent rhinorrhea.   Negative for diminished hearing.      CARDIOVASCULAR:  Negative for chest pain and palpitations.      RESPIRATORY:  Negative for recent cough and dyspnea.       GASTROINTESTINAL:  Negative for abdominal pain, constipation, diarrhea, nausea and vomiting.      GENITOURINARY:  Negative for dysuria and urinary incontinence.      MUSCULOSKELETAL:  Positive for arthralgias, joint stiffness and L > R foot pain.   Negative for myalgias.      NEUROLOGICAL:  Positive for paresthesia ( bilateral lower extremity ).   Negative for weakness.      PSYCHIATRIC:  Negative for anxiety, depression and sleep disturbance.          PMH/FMH/SH:     Last Reviewed on 2019 04:18 PM by Kia Juarez    Past Medical History:                 PAST MEDICAL HISTORY             GYNECOLOGICAL HISTORY:        Problems with menstrual cycles include irregular frequency/duration.      Contraception: S/P tubal ligation;    Menarche occurred at age 12.  Hospitalizations: Never     Hyperlipidemia    Hypertension         PAST MEDICAL HISTORY             CURRENT MEDICAL PROVIDERS:    Ophthalmologist: Twila Subramanian         PAST MEDICAL HISTORY                 ADVANCED DIRECTIVES: None         PREVENTIVE HEALTH MAINTENANCE             BONE DENSITY: has never been done     COLORECTAL CANCER SCREENING: declines colorectal cancer screening, understands reason for testing     EYE EXAM: Diabetic Eye Exam during this calendar year and results are in chart was last done 2018, no retinopathy     Hepatitis C Medicare Screening: was last done 2017     MAMMOGRAM: Done within last 2 years and results in are chart was last done 2019 with normal results     PAP SMEAR: was last done 2018         Surgical History:          section: X 3;      high school had ligament repair left knee;         Family History:         Positive for Hypertension ( mother ) and Sudden Cardiac Death ( father -- 64--heart failure ).      Positive for Type 2 Diabetes ( mother ).  Father:  at age 64; Cause of death was COPD     Mother:  at age 88; Cause of death was sepsis;  Coronary Artery Disease;  Hypertension;  Type 2 Diabetes         Social History:     Occupation: Unemployed     Marital Status:      Children: 3 children (ages 20, 17, 15 )     Ms. Palafox denies any current form of exercise.          Tobacco/Alcohol/Supplements:     Last Reviewed on 9/06/2019 04:18 PM by Kia Juarez    Tobacco: She has never smoked.          Alcohol:  Does not drink alcohol and never has.      Caffeine:  She admits to consuming caffeine via soda ( 2 servings per day ).          Substance Abuse History:     Last Reviewed on 9/06/2019 04:18 PM by Kia Juarez    None         Mental Health History:     Last Reviewed on 9/06/2019 04:18 PM by Kia Juarez    NEGATIVE         Communicable Diseases (eg STDs):     Last Reviewed on 9/06/2019 04:18 PM by Kia Juarez    Reportable health conditions; NEGATIVE             Current Problems:     Last Reviewed on 5/06/2019 04:03 PM by Kia Juarez    Peripheral neuropathy     Type 2 diabetes     Use of high risk medications     Vitamin D deficiency, unspecified     Urinary frequency     Leukocytosis, unspecified     Pulmonary hypertension     GERD     Hyperlipidemia     Generalized osteoarthritis     Essential hypertension         Immunizations:     [employer] Flu P13 10/24/2007     Fluzone Quadrivalent (3+ years) 10/12/2018         Allergies:     Last Reviewed on 5/06/2019 04:03 PM by Kia Juarez    Penicillins:        Current Medications:     Last Reviewed on 5/06/2019 04:03 PM by Kia Juarez    VESIcare 5mg Tablet Take 1 tablet(s) by mouth daily     Atenolol/Chlorthalidone 100mg/25mg Tablet Take 1 tablet(s) by mouth daily     Diclofenac Sodium 75mg Tablets, Enteric Coated 1 tab bid with food     Atorvastatin Calcium 20mg Tablet 1 tab daily     Metformin HCl 1,000mg Tablet 1 tab bid     Omeprazole 20mg Capsules, Extended Release 1 capsule daily     Silvadene 1% Cream Apply sufficient amount to affected area bid  prn     Tramadol 50mg Tablet 1 tab TID PRN      Vitamin D3 2,000IU Capsules 1 capsule daily     Gabapentin 300mg Capsules 1 capsule HS     Fluticasone Propionate 50mcg/1actuation Nasal Spray 1 spray each nostil daily     Loratadine 10mg Capsules Take 1 capsule(s) by mouth daily     Magnesium Take one tablet once daily         OBJECTIVE:        Vitals:         Current: 9/6/2019 3:40:48 PM    Ht:  5 ft, 8 in;  Wt: 401.4 lbs;  BMI: 61.0    T: 97.9 F (oral);  BP: 153/60 mm Hg (left arm, sitting);  P: 80 bpm (left arm (BP Cuff), sitting);  sCr: 0.92 mg/dL;  GFR: 105.05        Repeat:     4:32:28 PM     BP:   125/45mm Hg (left arm, sitting)         Exams:     PHYSICAL EXAM:     GENERAL: vital signs recorded - obese;  well groomed;  no apparent distress;     EYES: extraocular movements intact; conjunctiva and cornea are normal; PERRLA;     E/N/T: OROPHARYNX:  normal mucosa, dentition, gingiva, and posterior pharynx;     RESPIRATORY: normal respiratory rate and pattern with no distress; normal breath sounds with no rales, rhonchi, wheezes or rubs;     CARDIOVASCULAR: normal rate; rhythm is regular;  no systolic murmur; no edema;     GASTROINTESTINAL: nontender; normal bowel sounds;     MUSCULOSKELETAL: normal gait; normal overall tone     PSYCHIATRIC: appropriate affect and demeanor; normal psychomotor function; normal speech pattern;     Left foot exam    Protective sensation using Monofilament test: Loss of protective sensation. Approximately 4 grams of force is applied without sensory awareness.    Vascular status: normal peripheral vascular exam with palpable dorsal pedal and posterior tibal pulses and brisk digital capillary refill    Skin is intact without sores or ulcers    Right foot exam    Protective sensation using Monofilament test: Loss of protective sensation. Approximately 4 grams of force is applied without sensory awareness.    Vascular status: normal peripheral vascular exam with palpable dorsal pedal and posterior tibal pulses and brisk digital  capillary refill    Skin is intact without sores or ulcers         Lab/Test Results:             Urine temperature:  urine didn't temp up./pr (09/06/2019),     All urine drug screen levels confirmed negative:  yes (09/06/2019),     Date and time of last pill:  Gabapentin 09/05/2019 @ 2300, Tramadol 09/06/2019 @ 0700/AS (09/06/2019),     Performed by:  pr (09/06/2019),     Collection Time:  15:56 (09/06/2019),             ASSESSMENT           715.00   M15.0  Generalized osteoarthritis              DDx:     356.9   E11.42  Peripheral neuropathy              DDx:     V58.69   Z79.899  Use of high risk medications              DDx:     250.00   E11.9  Type 2 diabetes              DDx:     401.1   I10  Essential hypertension              DDx:     272.4   E78.2  Hyperlipidemia              DDx:     530.81   K21.9  GERD              DDx:         ORDERS:         Meds Prescribed:       Refill of: Gabapentin 400mg Capsules 1 cap once at bedtime  #30 (Thirty) capsule(s) Refills: 2       Refill of: VESIcare (Solifenacin Succinate) 5mg Tablet Take 1 tablet(s) by mouth daily  #90 (Ninety) tablet(s) Refills: 1       Refill of: Omeprazole 20mg Capsules, Extended Release 1 capsule daily  #90 (Ninety) capsule(s) Refills: 1         Radiology/Test Orders:       3014F  Screening mammography results documented and reviewed (PV)1  (In-House)         3017F  Colorectal CA screen results documented and reviewed (PV)  (In-House)         2022F  Dilated retinal eye exam w/interpret by ophthalmologist/optometrist documented/reviewed (DM)4  (In-House)           Lab Orders:       65122  Drug test prsmv read direct optical obs pr date  (In-House)         86884  DIAB1 - [facility] LIPID,CMP, A1C: 81294, 17857, 53312  (Send-Out)         37225  ESTEBAN [facility] Microablbumin, quantitative  (Send-Out)         APPTO  Appointment need  (In-House)           Procedures Ordered:       REFER  Referral to Specialist or Other Facility  (Send-Out)           Other Orders:        2028F  Foot examination performed (includes examination through visual inspection, sensory exam with monofi  (In-House)                   PLAN:          Generalized osteoarthritis She is stable on her current regimen.  Pain is well controlled.  No adverse effects.  She does require ongoing use of this controlled substance to function.  Tox screen and Gregg run today.  No refills needed today.  RTC 3 months.     MIPS Vaccines Flu and Pneumonia updated in Shot record Screening mammomgram done within last 2 years and results in are chart Colorectal Cancer Screening is up to date and the results are in the chart   Diabetic Eye Exam during this calendar year and results are in chart     FOLLOW-UP: Schedule a follow-up visit in 3 months..  f/u DM with [facility]           Orders:       3014F  Screening mammography results documented and reviewed (PV)1  (In-House)         3017F  Colorectal CA screen results documented and reviewed (PV)  (In-House)         2022F  Dilated retinal eye exam w/interpret by ophthalmologist/optometrist documented/reviewed (DM)4  (In-House)         APPTO  Appointment need  (In-House)            Peripheral neuropathy Still having neuropathic pain. Complete loss of sensation on monofilament exam today.  Increasing bedtime gabapentin to 400 mg as below.  She does require ongoing use of this controlled substance to function.  Controlled substance monitoring as above.           Prescriptions:       Refill of: Gabapentin 400mg Capsules 1 cap once at bedtime  #30 (Thirty) capsule(s) Refills: 2          Use of high risk medications     Controlled substance documentation: Gregg reviewed; drug screen performed and appropriate; consent is reviewed and signed and on the chart.  She is aware of risk of addiction on this medication, understands that she will need to follow up for a review every 3 months and her medications will be adjusted or decreased as deemed appropriate at each visit.  No history of  drug or alcohol abuse.  No concerns about diversion or abuse. She denies side effects related to the medication.  She is aware that she may be called in for pill counts.  The dosing of this medication will be reviewed on a regular basis and reduced if possible..  Ongoing use of a controlled substance is necessary for this patient to have a normal quality of life           Orders:       88858  Drug test prsmv read direct optical obs pr date  (In-House)            Type 2 diabetes She is on metformin for diabetes.  No refills needed.  She is due for eye exam (8/2018); referral placed.  Foot exam today shows complete loss of sensation.  Recommend daily foot checks and do not walk barefoot outside.  She is on a statin.  Checking labs.     LABORATORY:  Labs ordered to be performed today include Diabetes Panel 1; CMP, Lipid, A1C and Microalbumin.      REFERRALS:  [facility] of diabetic eye exam ).            Orders:       17775  DIAB[address] [place] [address] LIPID,CMP, A1C: 40963, 30816, 92925  (Send-Out)         71689  [facility] Microablbumin, quantitative  (Send-Out)         REFER  Referral to Specialist or Other Facility  (Send-Out)            Essential hypertension BP at goal at home.  Checking labs.  No refills needed.          Hyperlipidemia Stable.  Checking labs.  No refills needed.          GERD Stable.  Refills sent.           Prescriptions:       Refill of: Omeprazole 20mg Capsules, Extended Release 1 capsule daily  #90 (Ninety) capsule(s) Refills: 1             Other Prescriptions:       Refill of: VESIcare (Solifenacin Succinate) 5mg Tablet Take 1 tablet(s) by mouth daily  #90 (Ninety) tablet(s) Refills: 1         Other Orders:       2028F  Foot examination performed (includes examination through visual inspection, sensory exam with monofi  (In-House)           Patient Recommendations:        For  Generalized osteoarthritis:     Schedule a follow-up visit in  3 months.                APPOINTMENT INFORMATION:        Monday Tuesday Wednesday Thursday Friday Saturday Sunday            Time:___________________AM  PM   Date:_____________________             CHARGE CAPTURE           **Please note: ICD descriptions below are intended for billing purposes only and may not represent clinical diagnoses**        Primary Diagnosis:         715.00 Generalized osteoarthritis            M15.0    Primary generalized (osteo)arthritis              Orders:          40832   Office/outpatient visit; established patient, level 4  (In-House)             3014F   Screening mammography results documented and reviewed (PV)1  (In-House)             3017F   Colorectal CA screen results documented and reviewed (PV)  (In-House)             2022F   Dilated retinal eye exam w/interpret by ophthalmologist/optometrist documented/reviewed (DM)4  (In-House)             APPTO   Appointment need  (In-House)           356.9 Peripheral neuropathy            E11.42    Type 2 diabetes mellitus with diabetic polyneuropathy    V58.69 Use of high risk medications            Z79.899    Other long term (current) drug therapy              Orders:          40171   Drug test prsmv read direct optical obs pr date  (In-House)           250.00 Type 2 diabetes            E11.9    Type 2 diabetes mellitus without complications    401.1 Essential hypertension            I10    Essential (primary) hypertension    272.4 Hyperlipidemia            E78.2    Mixed hyperlipidemia    530.81 GERD            K21.9    Gastro-esophageal reflux disease without esophagitis        Other Orders:           2028F   Foot examination performed (includes examination through visual inspection, sensory exam with monofi  (In-House)

## 2021-05-18 NOTE — PROGRESS NOTES
Sarahi Palafox 1960     Office/Outpatient Visit    Visit Date: Fri, Jan 18, 2019 02:31 pm    Provider: Kia Juarez MD (Assistant: Radha Sepulveda MA)    Location: [facility]        Electronically signed by Kia Juarez MD on  01/18/2019 04:44:54 PM                             SUBJECTIVE:        CC:     Ms. Palafox is a 58 year old White female.  This is a follow-up visit.  Medicare Wellness and 3 month follow up on Chronic;         HPI:     She is due for colonoscopy, this has never been done.  She is UTD on pap smear, last done 1/2018 and this was normal.  She is due for mammogram, last done 1/2018 and this was normal.  She had this done earlier today.  She is UTD on flu (10/2018).  She is due for Shingrix, Havrix and Td.  She is due for routine labs including DM panel.  She is UTD on eye exam (8/2018).  She is UTD on foot exam.         She is on tramadol for generalized osteoarthritis.  Pain is well controlled.  No adverse effects.  She has been stable on this regimen.  She is also on gabapentin for diabetic peripheral neuropathy. The gabapentin we started at 100 mg QHS last time seems to be helping the hot flashes but is only minimally helping the leg pain thus far.  It works best if she takes it about an hour before sleep.     Ms. Palafox is here for a Medicare wellness visit.  ADVANCED DIRECTIVES: None     Returning to health checkup, the required HRA questions are integrated within this visit note. Family medical history and individual medical/surgical history were reviewed and updated.  A current height, weight, BMI, blood pressure, and pulse were recorded in the vitals section of the note and have been reviewed. Patient's medications, including supplements, were recorded in the chart and reviewed.  Current providers and suppliers were reviewed and updated.          Self-Assessment of Health: She rates her health as fair. She rates her confidence of being able to control/manage most  of her health problems as not very confident. Her physical/emotional health has limited her social activites quite a bit.  A review of cognitive impairment was performed, including ability to drive a car, manage finances, and any memory changes, and was found to be negative.  A review of functional ability, including bathing, dressing, walking, and urine/bowel continence as well as level of safety was performed and was found to be negative.  Falls Risk: Has not had any falls or only one fall without injury in the past year.  She denies having trouble hearing the TV/radio when others do not, having to strain to hear or understand conversations and wearing hearing aid(s).  Concerning home safety, she reports that at home she DOES have adequate lighting, a skid resistant shower/tub, functioning smoke alarms and absence of throw rugs, but not grab bars in the bath or handrails on stairs.  Physical Activity: She never excercises.; Type of diet patient normally eats is described as poor--needs improvement.  Tobacco: She has never smoked.  Preventative Health updated today         PHQ-9 Depression Screening: Completed form scanned and in chart; Total Score 0     ROS:     CONSTITUTIONAL:  Positive for fatigue.   Negative for fever.      EYES:  Negative for blurred vision.      E/N/T:  Negative for diminished hearing and nasal congestion.      CARDIOVASCULAR:  Negative for chest pain and palpitations.      RESPIRATORY:  Negative for recent cough and dyspnea.      GASTROINTESTINAL:  Negative for abdominal pain, constipation, diarrhea, nausea and vomiting.      GENITOURINARY:  Negative for dysuria and urinary incontinence.      MUSCULOSKELETAL:  Positive for arthralgias, joint stiffness and L > R foot pain.   Negative for myalgias.      NEUROLOGICAL:  Positive for paresthesia ( bilateral lower extremity ).   Negative for weakness.      PSYCHIATRIC:  Negative for anxiety, depression and sleep disturbance.          PMH/FMH/SH:      Last Reviewed on 2019 03:05 PM by Kia Juarez    Past Medical History:             GYNECOLOGICAL HISTORY:        Problems with menstrual cycles include irregular frequency/duration.      Contraception: S/P tubal ligation;    Menarche occurred at age 12.    Last Pap was unsure; No abnormal Paps    Last mammogram was 7 to 8 years Hospitalizations: Never     Hyperlipidemia    Hypertension         PAST MEDICAL HISTORY             CURRENT MEDICAL PROVIDERS:    Ophthalmologist: Twila Subramanian         PAST MEDICAL HISTORY                 ADVANCED DIRECTIVES: None         PREVENTIVE HEALTH MAINTENANCE             BONE DENSITY: has never been done     COLORECTAL CANCER SCREENING: declines colorectal cancer screening, understands reason for testing     EYE EXAM: Diabetic Eye Exam during this calendar year and results are in chart was last done 2018     Hepatitis C Medicare Screening: was last done 2017     MAMMOGRAM: Done within last 2 years and results in are chart was last done 1/3/2018 with normal results 2018     PAP SMEAR: was last done 2018         Surgical History:          section: X 3;      high school had ligament repair left knee;         Family History:         Positive for Hypertension ( mother ) and Sudden Cardiac Death ( father -- 64--heart failure ).      Positive for Type 2 Diabetes ( mother ).  Father:  at age 64; Cause of death was COPD     Mother:  at age 88; Cause of death was sepsis;  Coronary Artery Disease; Hypertension;  Type 2 Diabetes         Social History:     Occupation: Unemployed     Marital Status:      Children: 3 children (ages 20, 17, 15 )     Ms. Palafox denies any current form of exercise.          Tobacco/Alcohol/Supplements:     Last Reviewed on 2019 03:05 PM by Kia Juarez    Tobacco: She has never smoked.          Alcohol:  Does not drink alcohol and never has.      Caffeine:  She admits to consuming caffeine via  soda ( 2 servings per day ).          Substance Abuse History:     Last Reviewed on 1/18/2019 03:05 PM by Kia Juarez    None         Mental Health History:     Last Reviewed on 1/18/2019 03:05 PM by Kia Juarez    NEGATIVE         Communicable Diseases (eg STDs):     Last Reviewed on 1/18/2019 03:05 PM by Kia Juarez    Reportable health conditions; NEGATIVE             Current Problems:     Last Reviewed on 10/12/2018 03:00 PM by Kia Juarez    Peripheral neuropathy     Type 2 diabetes     Use of high risk medications     Vitamin D deficiency, unspecified     Urinary frequency     Leukocytosis, unspecified     Pulmonary hypertension     GERD     Hyperlipidemia     Generalized osteoarthritis     Essential hypertension         Immunizations:     [employer] Flu P13 10/24/2007     Fluzone Quadrivalent (3+ years) 10/12/2018         Allergies:     Last Reviewed on 1/18/2019 02:36 PM by Radha Sepulveda    Penicillins:        Current Medications:     Last Reviewed on 10/12/2018 03:00 PM by Kia Juarez    Atorvastatin Calcium 20mg Tablet 1 tab daily     VESIcare 5mg Tablet Take 1 tablet(s) by mouth daily     Tramadol 50mg Tablet 1 tab TID PRN     Atenolol/Chlorthalidone 100mg/25mg Tablet Take 1 tablet(s) by mouth daily     Diclofenac Sodium 75mg Tablets, Enteric Coated 1 tab bid with food     Metformin HCl 1,000mg Tablet 1 tab bid     Omeprazole 20mg Capsules, Extended Release 1 capsule daily     Silvadene 1% Cream Apply sufficient amount to affected area bid  prn     Vitamin D3 2,000IU Capsules 1 capsule daily     Gabapentin 100mg Capsules 1 capsule HS     Loratadine 10mg Capsules Take 1 capsule(s) by mouth daily     Magnesium Take one tablet once daily         OBJECTIVE:        Vitals:         Current: 1/18/2019 2:39:33 PM    Ht:  5 ft, 8 in;  Wt: 403.6 lbs;  BMI: 61.4    T: 98.4 F (oral);  BP: 154/86 mm Hg (left arm, sitting);  P: 75 bpm (left arm (BP Cuff), sitting);  sCr: 1.03 mg/dL;  GFR: 95.16     VA: 20/30 OD, 20/30 OS (with correction)        Exams:     PHYSICAL EXAM:     GENERAL: vital signs recorded - obese;  well groomed;  no apparent distress;     EYES: extraocular movements intact; conjunctiva and cornea are normal; PERRLA;     E/N/T: OROPHARYNX:  normal mucosa, dentition, gingiva, and posterior pharynx;     RESPIRATORY: normal respiratory rate and pattern with no distress; normal breath sounds with no rales, rhonchi, wheezes or rubs;     CARDIOVASCULAR: normal rate; rhythm is regular;  no systolic murmur; no edema;     GASTROINTESTINAL: nontender; normal bowel sounds;     MUSCULOSKELETAL: normal gait; normal overall tone     NEUROLOGIC: mental status: alert and oriented x 3; Reflexes: brachioradialis: 2+; knee jerks: 2+;     PSYCHIATRIC: appropriate affect and demeanor; normal psychomotor function; normal speech pattern;         Lab/Test Results:             Urine temperature:  confirmed (01/18/2019),     All urine drug screen levels confirmed negative:  yes (01/18/2019),     Date and time of last pill:  tramadol 1/18/19 @630am, Gabapentin 1/17/19 @ 900pm/AS (01/18/2019),     Performed by:  PB (01/18/2019),     Collection Time:  1533 (01/18/2019),             ASSESSMENT           V70.0   Z00.00  Health checkup              DDx:     250.00   E11.9  Type 2 diabetes              DDx:     715.00   M15.0  Generalized osteoarthritis              DDx:     V58.69   Z79.899  Use of high risk medications              DDx:     V79.0   Z13.89  Screening for depression              DDx:         ORDERS:         Meds Prescribed:       Refill of: Gabapentin 100mg Capsules 2 at hs  #60 (Sixty) capsule(s) Refills: 2         Radiology/Test Orders:       3014F  Screening mammography results documented and reviewed (PV)1  (In-House)         2022F  Dilated retinal eye exam w/interpret by ophthalmologist/optometrist documented/reviewed (DM)4  (In-House)           Lab Orders:       APPTO  Appointment need  (In-House)          43690  DIAB2 - [facility] CMP A1C LIPID AND MICRO ALBUM CR RATIO: [id number]  (Send-Out)         72051  Drug test prsmv qual dir optical obs per day  (In-House)           Procedures Ordered:         Annual wellness visit, includes a PPPS, subsequent visit  (In-House)           Other Orders:       1101F  Pt screen for fall risk; document no falls in past year or only 1 fall w/o injury in past year (OSCAR)  (In-House)           Calculated BMI above the upper parameter and a follow-up plan was documented in the medical record  (In-House)           Depression screen negative  (In-House)                   PLAN:          Health checkup She is due for colonoscopy, this has never been done.  She declines this after discussion of the benefits.  She is UTD on pap smear, last done 1/2018 and this was normal.  She is due for mammogram, last done 1/2018 and this was normal.  She had this done earlier today; awaiting results.  She is UTD on flu (10/2018).  She is due for Shingrix, Havrix and Td; can be done at the pharmacy or Health Dept.  She is due for routine labs including DM panel; ordered  She is UTD on eye exam (8/2018).  She is UTD on foot exam.  No fall risk, no memory issues, no signs/symptoms of depression.  She lives with her .  She is able to drive and perform ADLs/manage finances independently.  Hearing is adequate.  She does not have a living will; information given today on how to obtain one.  Preventive services handout and safety handout were given to her.  Current doctor list updated.  RTC 3 months.     MIPS PHQ-9 Depression Screening Completed form scanned and in chart; Total Score 0     MAMMOGRAM: Done within last 2 years and results in are chart     BMI Elevated - Follow-Up Plan: She was provided education on weight loss strategies     FOLLOW-UP: Schedule a follow-up visit in 3 months..            Orders:         Annual wellness visit, includes a PPPS, subsequent visit   (In-House)         1101F  Pt screen for fall risk; document no falls in past year or only 1 fall w/o injury in past year (OSCAR)  (In-House)         3014F  Screening mammography results documented and reviewed (PV)1  (In-House)           Calculated BMI above the upper parameter and a follow-up plan was documented in the medical record  (In-House)         2022F  Dilated retinal eye exam w/interpret by ophthalmologist/optometrist documented/reviewed (DM)4  (In-House)         APPTO  Appointment need  (In-House)           Depression screen negative  (In-House)             Patient Education Handouts:       Physical Exam 60+ year, Female           Type 2 diabetes     LABORATORY:  Labs ordered to be performed today include Diabetes Panel 2;CMP, A1C, Lipid, Microalbumin:Creatinine Ratio.            Prescriptions:       Refill of: Gabapentin 100mg Capsules 2 at hs  #60 (Sixty) capsule(s) Refills: 2           Orders:       13239  DIAB2 - [facility] CMP A1C LIPID AND MICRO ALBUM CR RATIO: [id number]  (Send-Out)            Generalized osteoarthritis Tramadol is adequately controlling her MSK pain issues.  No adverse effects.  Gabapentin is helping but incompletely, so I am going to increase her from 100 to 200 mg QHS and see how that does.  She does require ongoing use of these controlled substances to function.  Tox screen and Gregg run today. RTC 3 months.          Use of high risk medications     Controlled substance documentation: Gregg reviewed; drug screen performed and appropriate; consent is reviewed and signed and on the chart.  She is aware of risk of addiction on this medication, understands that she will need to follow up for a review every 3 months and her medications will be adjusted or decreased as deemed appropriate at each visit.  No history of drug or alcohol abuse.  No concerns about diversion or abuse. She denies side effects related to the medication.  She is aware that she may be  called in for pill counts.  The dosing of this medication will be reviewed on a regular basis and reduced if possible..  Ongoing use of a controlled substance is necessary for this patient to have a normal quality of life           Orders:       38364  Drug test prsmv qual dir optical obs per day  (In-House)               Patient Recommendations:        For  Health checkup:     Schedule a follow-up visit in 3 months.                APPOINTMENT INFORMATION:        Monday Tuesday Wednesday Thursday Friday Saturday Sunday            Time:___________________AM  PM   Date:_____________________             CHARGE CAPTURE           **Please note: ICD descriptions below are intended for billing purposes only and may not represent clinical diagnoses**        Primary Diagnosis:         V70.0 Health checkup            Z00.00    Encounter for general adult medical examination without abnormal findings              Orders:             Annual wellness visit, includes a PPPS, subsequent visit  (In-House)             1101F   Pt screen for fall risk; document no falls in past year or only 1 fall w/o injury in past year (OSCAR)  (In-House)             3014F   Screening mammography results documented and reviewed (PV)1  (In-House)                Calculated BMI above the upper parameter and a follow-up plan was documented in the medical record  (In-House)             2022F   Dilated retinal eye exam w/interpret by ophthalmologist/optometrist documented/reviewed (DM)4  (In-House)             APPTO   Appointment need  (In-House)                Depression screen negative  (In-House)           250.00 Type 2 diabetes            E11.9    Type 2 diabetes mellitus without complications    715.00 Generalized osteoarthritis            M15.0    Primary generalized (osteo)arthritis    V58.69 Use of high risk medications            Z79.899    Other long term (current) drug therapy              Orders:          10121   Drug test  prsmv qual dir optical obs per day  (In-House)           V79.0 Screening for depression            Z13.89    Encounter for screening for other disorder

## 2021-05-18 NOTE — PROGRESS NOTES
Sarahi PalafoxValentín 1960     Office/Outpatient Visit    Visit Date: Mon, May 6, 2019 03:51 pm    Provider: Kia Juarez MD (Assistant: Radha Sepulveda MA)    Location: [facility]        Electronically signed by Kia Juarez MD on  05/06/2019 04:58:49 PM                             SUBJECTIVE:        CC:     Ms. Palafox is a 58 year old White female.  This is a follow-up visit.  Gabapentin;         HPI: Sarahi is here today for follow-up on chronic issues.        She is on diclofenac and tramadol for generalized OA, worst in the bilateral shoulders and knees.  She takes the tramadol daily, 1-2 tabs per day typically.  She uses a cane to ambulate.  She follows with Dr. Cisneros but is not felt to be a surgical candidate currently.  Has used Synvisc but it was not effective.  No adverse effects.  +Pain and numbness in the feet, appears to be due to diabetic peripheral neuropathy, so she was started on gabapentin, now up to 200 mg QHS.  She is still having some pain there.  She is using that at bedtime but it is not really making her sleepy.        She is on metformin for diabetes.  A1c has been slowly dropping and was 7.6 when lasted checked in Feb.  She is on a statin.  She has been working on her diabetic diet.  She is UTD on eye exam (8/2018).  She is UTD on foot exam (7/2018).         She is on atenolol/chlorthalidone for HTN.  BP has been well controlled.  She does frequently have elevated BP here at the clinic.  No CP, SOB, palpitations.        She is on atorvastatin for HLD.  No myalgias.        She is on Vesicare for urge incontinence.  Urinary sx well controlled.        She is on omeprazole for GERD.  No reflux, heartburn or epigastric pain.     ROS:     CONSTITUTIONAL:  Positive for fatigue.   Negative for fever.      EYES:  Negative for blurred vision.      E/N/T:  Positive for nasal congestion and frequent rhinorrhea.   Negative for diminished hearing.      CARDIOVASCULAR:  Negative for  chest pain and palpitations.      RESPIRATORY:  Negative for recent cough and dyspnea.      GASTROINTESTINAL:  Negative for abdominal pain, constipation, diarrhea, nausea and vomiting.      GENITOURINARY:  Negative for dysuria and urinary incontinence.      MUSCULOSKELETAL:  Positive for arthralgias, joint stiffness and L > R foot pain.   Negative for myalgias.      NEUROLOGICAL:  Positive for paresthesia ( bilateral lower extremity ).   Negative for weakness.      PSYCHIATRIC:  Negative for anxiety, depression and sleep disturbance.          PMH/FMH/SH:     Last Reviewed on 2019 04:03 PM by Kia Juarez    Past Medical History:                 PAST MEDICAL HISTORY             GYNECOLOGICAL HISTORY:        Problems with menstrual cycles include irregular frequency/duration.      Contraception: S/P tubal ligation;    Menarche occurred at age 12.  Hospitalizations: Never     Hyperlipidemia    Hypertension         PAST MEDICAL HISTORY             CURRENT MEDICAL PROVIDERS:    Ophthalmologist: Twila Subramanian         PAST MEDICAL HISTORY                 ADVANCED DIRECTIVES: None         PREVENTIVE HEALTH MAINTENANCE             BONE DENSITY: has never been done     COLORECTAL CANCER SCREENING: declines colorectal cancer screening, understands reason for testing     EYE EXAM: Diabetic Eye Exam during this calendar year and results are in chart was last done 2018, no retinopathy     Hepatitis C Medicare Screening: was last done 2017     MAMMOGRAM: Done within last 2 years and results in are chart was last done 2019 with normal results     PAP SMEAR: was last done 2018         Surgical History:          section: X 3;      high school had ligament repair left knee;         Family History:         Positive for Hypertension ( mother ) and Sudden Cardiac Death ( father -- 64--heart failure ).      Positive for Type 2 Diabetes ( mother ).  Father:  at age 64; Cause of death was COPD      Mother:  at age 88; Cause of death was sepsis;  Coronary Artery Disease; Hypertension;  Type 2 Diabetes         Social History:     Occupation: Unemployed     Marital Status:      Children: 3 children (ages 20, 17, 15 )     Ms. Palafox denies any current form of exercise.          Tobacco/Alcohol/Supplements:     Last Reviewed on 2019 04:03 PM by Kia Juarez    Tobacco: She has never smoked.          Alcohol:  Does not drink alcohol and never has.      Caffeine:  She admits to consuming caffeine via soda ( 2 servings per day ).          Substance Abuse History:     Last Reviewed on 2019 04:03 PM by Kia Juarez    None         Mental Health History:     Last Reviewed on 2019 04:03 PM by Kia Juarez    NEGATIVE         Communicable Diseases (eg STDs):     Last Reviewed on 2019 04:03 PM by Kia Juarez    Reportable health conditions; NEGATIVE             Current Problems:     Last Reviewed on 2019 03:05 PM by Kia Juarez    Peripheral neuropathy     Type 2 diabetes     Use of high risk medications     Vitamin D deficiency, unspecified     Urinary frequency     Leukocytosis, unspecified     Pulmonary hypertension     GERD     Hyperlipidemia     Generalized osteoarthritis     Essential hypertension         Immunizations:     [employer] Flu P13 10/24/2007     Fluzone Quadrivalent (3+ years) 10/12/2018         Allergies:     Last Reviewed on 2019 03:58 PM by Radha Sepulveda    Penicillins:        Current Medications:     Last Reviewed on 2019 03:58 PM by Radha Sepulveda    Diclofenac Sodium 75mg Tablets, Enteric Coated 1 tab bid with food     Tramadol 50mg Tablet 1 tab TID PRN     Atorvastatin Calcium 20mg Tablet 1 tab daily     Metformin HCl 1,000mg Tablet 1 tab bid     Omeprazole 20mg Capsules, Extended Release 1 capsule daily     VESIcare 5mg Tablet Take 1 tablet(s) by mouth daily     Atenolol/Chlorthalidone 100mg/25mg Tablet Take 1 tablet(s) by mouth  daily     Silvadene 1% Cream Apply sufficient amount to affected area bid  prn     Vitamin D3 2,000IU Capsules 1 capsule daily     Gabapentin 100mg Capsules 2 at hs     Loratadine 10mg Capsules Take 1 capsule(s) by mouth daily     Magnesium Take one tablet once daily         OBJECTIVE:        Vitals:         Current: 5/6/2019 3:59:04 PM    Ht:  5 ft, 8 in;  Wt: 393.4 lbs;  BMI: 59.8    T: 98.3 F (oral);  BP: 164/77 mm Hg (left arm, sitting);  P: 70 bpm (left arm (BP Cuff), sitting);  sCr: 0.92 mg/dL;  GFR: 105.39        Repeat:     4:18:54 PM     BP:   135/67mm Hg (left arm, sitting)         Exams:     PHYSICAL EXAM:     GENERAL: vital signs recorded - obese;  well groomed;  no apparent distress;     EYES: extraocular movements intact; conjunctiva and cornea are normal; PERRLA;     E/N/T: OROPHARYNX:  normal mucosa, dentition, gingiva, and posterior pharynx;     RESPIRATORY: normal respiratory rate and pattern with no distress; normal breath sounds with no rales, rhonchi, wheezes or rubs;     CARDIOVASCULAR: normal rate; rhythm is regular;  no systolic murmur; no edema;     GASTROINTESTINAL: nontender; normal bowel sounds;     MUSCULOSKELETAL: normal gait; normal overall tone     PSYCHIATRIC: appropriate affect and demeanor; normal psychomotor function; normal speech pattern;         Lab/Test Results:             Hemoglobin A1c:  7.5 (05/06/2019),     Urine temperature:  confirmed (05/06/2019),     All urine drug screen levels confirmed negative:  yes (05/06/2019),     Date and time of last pill:  Tramadol 5/6/19 @ 6a.m. Gabapentin 5/5/19 @ 1030p.m./KG (05/06/2019),     Performed by:  tls (05/06/2019),     Collection Time:  1620 (05/06/2019),             ASSESSMENT           715.00   M15.0  Generalized osteoarthritis              DDx:     356.9   E11.42  Peripheral neuropathy              DDx:     V58.69   Z79.899  Use of high risk medications              DDx:     250.00   E11.9  Type 2 diabetes              DDx:      401.1   I10  Essential hypertension              DDx:     530.81   K21.9  GERD              DDx:     477.0   J30.1  Allergies              DDx:         ORDERS:         Meds Prescribed:       Refill of: Tramadol 50mg Tablet 1 tab TID PRN  #90 (Ninety) tablet(s) Refills: 3       Refill of: Gabapentin (Gabapentin)  300mg Capsules 1 capsule HS  #30 (Thirty) capsule(s) Refills: 3       Fluticasone Propionate 50mcg/1actuation Nasal Spray 1 spray each nostil daily  #16 (Sixteen) gm Refills: 11         Radiology/Test Orders:       3014F  Screening mammography results documented and reviewed (PV)1  (In-House)         2022F  Dilated retinal eye exam w/interpret by ophthalmologist/optometrist documented/reviewed (DM)4  (In-House)           Lab Orders:       APPTO  Appointment need  (In-House)         57843  Drug test prsmv qual dir optical obs per day  (In-House)         04662*  Hgb A1c fast lab  (In-House)           Procedures Ordered:       00612  Collection of capillary blood specimen (eg, finger, heel, ear stick)  (In-House)                   PLAN:          Generalized osteoarthritis Stable on current regimen of tramadol TID for generalized OA.  This does keep her pain manageable and allow her to function.  She follows with Dr. Cisneros for multiple joint complaints, but given her weight, she is not felt to be a surgical candidate at this time.  Hence we continue with med management.  Tox screen and Gregg run.  Refills sent.  RTC 4 months.     MIPS Vaccines Flu and Pneumonia updated in Shot record    DIABETIC EYE EXAM: Diabetic Eye Exam during this calendar year and results are in chart     MAMMOGRAM: Done within last 2 years and results in are chart     FOLLOW-UP: Schedule a follow-up visit in 4 months..  f/u DM with [facility]           Prescriptions:       Refill of: Tramadol 50mg Tablet 1 tab TID PRN  #90 (Ninety) tablet(s) Refills: 3           Orders:       3014F  Screening mammography results documented and reviewed  (PV)1  (In-House)         2022F  Dilated retinal eye exam w/interpret by ophthalmologist/optometrist documented/reviewed (DM)4  (In-House)         APPTO  Appointment need  (In-House)            Peripheral neuropathy She is getting some benefit from the gabapentin for diabetic peripheral neuropathy but still having a fair amount of burning in the feet at night that at times makes it difficult to sleep.  Increasing gabapentin to 300 mg QHS and we will see how she does with this.  No adverse effects.  She does require ongoing use of this controlled substance to function.  Controlled substance monitoring as above.           Prescriptions:       Refill of: Gabapentin (Gabapentin)  300mg Capsules 1 capsule HS  #30 (Thirty) capsule(s) Refills: 3          Use of high risk medications     Controlled substance documentation: Gregg reviewed; drug screen performed and appropriate; consent is reviewed and signed and on the chart.  She is aware of risk of addiction on this medication, understands that she will need to follow up for a review every 3 months and her medications will be adjusted or decreased as deemed appropriate at each visit.  No history of drug or alcohol abuse.  No concerns about diversion or abuse. She denies side effects related to the medication.  She is aware that she may be called in for pill counts.  The dosing of this medication will be reviewed on a regular basis and reduced if possible..  Ongoing use of a controlled substance is necessary for this patient to have a normal quality of life           Orders:       36590  Drug test prsmv qual dir optical obs per day  (In-House)            Type 2 diabetes She is on metformin for diabetes.  A1c has been slowly dropping and was 7.6 when lasted checked in Feb.  Checking again today.  She is on a statin.  Encouraged her efforts -- she has been working on her diabetic diet.  She is UTD on eye exam (8/2018).  She is UTD on foot exam (7/2018).     LABORATORY:  Labs  ordered to be performed today include Hgb A1c inhouse fast lab.            Orders:       27467*  Hgb A1c fast lab  (In-House)         48503  Collection of capillary blood specimen (eg, finger, heel, ear stick)  (In-House)            Essential hypertension BP at goal.  No refills needed.  Labs reviewed and UTD.          GERD Stable.  No refills needed.          Allergies           Prescriptions:       Fluticasone Propionate 50mcg/1actuation Nasal Spray 1 spray each nostil daily  #16 (Sixteen) gm Refills: 11             Patient Recommendations:        For  Generalized osteoarthritis:     Schedule a follow-up visit in 4 months.                APPOINTMENT INFORMATION:        Monday Tuesday Wednesday Thursday Friday Saturday Sunday            Time:___________________AM  PM   Date:_____________________             CHARGE CAPTURE           **Please note: ICD descriptions below are intended for billing purposes only and may not represent clinical diagnoses**        Primary Diagnosis:         715.00 Generalized osteoarthritis            M15.0    Primary generalized (osteo)arthritis              Orders:          99802   Office/outpatient visit; established patient, level 4  (In-House)             3014F   Screening mammography results documented and reviewed (PV)1  (In-House)             2022F   Dilated retinal eye exam w/interpret by ophthalmologist/optometrist documented/reviewed (DM)4  (In-House)             APPTO   Appointment need  (In-House)           356.9 Peripheral neuropathy            E11.42    Type 2 diabetes mellitus with diabetic polyneuropathy    V58.69 Use of high risk medications            Z79.899    Other long term (current) drug therapy              Orders:          46469   Drug test prsmv qual dir optical obs per day  (In-House)           250.00 Type 2 diabetes            E11.9    Type 2 diabetes mellitus without complications              Orders:          31871*   Hgb A1c fast lab  (In-House)              90122   Collection of capillary blood specimen (eg, finger, heel, ear stick)  (In-House)           401.1 Essential hypertension            I10    Essential (primary) hypertension    530.81 GERD            K21.9    Gastro-esophageal reflux disease without esophagitis    477.0 Allergies            J30.1    Allergic rhinitis due to pollen

## 2021-06-16 RX ORDER — ATENOLOL AND CHLORTHALIDONE TABLET 100; 25 MG/1; MG/1
TABLET ORAL
COMMUNITY
Start: 2021-04-09 | End: 2021-08-13

## 2021-06-16 RX ORDER — ATENOLOL AND CHLORTHALIDONE TABLET 100; 25 MG/1; MG/1
TABLET ORAL
Qty: 90 TABLET | Refills: 1 | Status: SHIPPED | OUTPATIENT
Start: 2021-06-16 | End: 2021-11-03

## 2021-06-16 RX ORDER — TRAMADOL HYDROCHLORIDE 50 MG/1
50 TABLET ORAL EVERY 8 HOURS PRN
COMMUNITY
Start: 2021-05-26 | End: 2021-08-25

## 2021-06-16 RX ORDER — GABAPENTIN 400 MG/1
CAPSULE ORAL
COMMUNITY
Start: 2021-05-21 | End: 2021-07-21

## 2021-06-16 RX ORDER — ATORVASTATIN CALCIUM 20 MG/1
20 TABLET, FILM COATED ORAL DAILY
COMMUNITY
Start: 2021-04-21 | End: 2021-09-21

## 2021-06-16 RX ORDER — SOLIFENACIN SUCCINATE 5 MG/1
TABLET, FILM COATED ORAL
COMMUNITY
Start: 2021-04-14 | End: 2021-07-01

## 2021-06-16 RX ORDER — GLIMEPIRIDE 1 MG/1
TABLET ORAL
COMMUNITY
Start: 2021-05-26 | End: 2021-06-25 | Stop reason: SDUPTHER

## 2021-06-16 RX ORDER — DICLOFENAC SODIUM 75 MG/1
TABLET, DELAYED RELEASE ORAL
Qty: 60 TABLET | Refills: 3 | Status: SHIPPED | OUTPATIENT
Start: 2021-06-16 | End: 2021-11-03

## 2021-06-16 RX ORDER — FLUTICASONE PROPIONATE 50 MCG
SPRAY, SUSPENSION (ML) NASAL
COMMUNITY
Start: 2021-04-21 | End: 2021-09-21

## 2021-06-16 RX ORDER — DICLOFENAC SODIUM 75 MG/1
TABLET, DELAYED RELEASE ORAL
COMMUNITY
Start: 2021-05-06 | End: 2021-08-13

## 2021-06-16 RX ORDER — OMEPRAZOLE 20 MG/1
CAPSULE, DELAYED RELEASE ORAL
COMMUNITY
Start: 2021-05-27 | End: 2021-08-25

## 2021-06-25 RX ORDER — GLIMEPIRIDE 1 MG/1
1 TABLET ORAL
Qty: 90 TABLET | Refills: 1 | Status: SHIPPED | OUTPATIENT
Start: 2021-06-25 | End: 2022-01-17

## 2021-07-01 VITALS
TEMPERATURE: 98.7 F | HEART RATE: 64 BPM | DIASTOLIC BLOOD PRESSURE: 71 MMHG | WEIGHT: 293 LBS | HEIGHT: 68 IN | BODY MASS INDEX: 44.41 KG/M2 | SYSTOLIC BLOOD PRESSURE: 139 MMHG

## 2021-07-01 VITALS
BODY MASS INDEX: 44.41 KG/M2 | HEART RATE: 57 BPM | TEMPERATURE: 98.8 F | DIASTOLIC BLOOD PRESSURE: 70 MMHG | SYSTOLIC BLOOD PRESSURE: 130 MMHG | HEIGHT: 68 IN | WEIGHT: 293 LBS

## 2021-07-01 VITALS
SYSTOLIC BLOOD PRESSURE: 160 MMHG | HEIGHT: 68 IN | WEIGHT: 293 LBS | HEART RATE: 67 BPM | DIASTOLIC BLOOD PRESSURE: 54 MMHG | BODY MASS INDEX: 44.41 KG/M2 | TEMPERATURE: 98.1 F

## 2021-07-01 VITALS
BODY MASS INDEX: 44.41 KG/M2 | HEART RATE: 80 BPM | HEIGHT: 68 IN | TEMPERATURE: 97.9 F | SYSTOLIC BLOOD PRESSURE: 125 MMHG | WEIGHT: 293 LBS | DIASTOLIC BLOOD PRESSURE: 45 MMHG

## 2021-07-01 VITALS
BODY MASS INDEX: 44.41 KG/M2 | HEART RATE: 75 BPM | WEIGHT: 293 LBS | HEIGHT: 68 IN | SYSTOLIC BLOOD PRESSURE: 154 MMHG | DIASTOLIC BLOOD PRESSURE: 86 MMHG | TEMPERATURE: 98.4 F

## 2021-07-01 VITALS
WEIGHT: 293 LBS | DIASTOLIC BLOOD PRESSURE: 84 MMHG | BODY MASS INDEX: 44.41 KG/M2 | HEIGHT: 68 IN | TEMPERATURE: 98.2 F | HEART RATE: 98 BPM | SYSTOLIC BLOOD PRESSURE: 140 MMHG

## 2021-07-01 VITALS
HEIGHT: 68 IN | WEIGHT: 293 LBS | SYSTOLIC BLOOD PRESSURE: 135 MMHG | HEART RATE: 70 BPM | DIASTOLIC BLOOD PRESSURE: 67 MMHG | BODY MASS INDEX: 44.41 KG/M2 | TEMPERATURE: 98.3 F

## 2021-07-01 RX ORDER — SOLIFENACIN SUCCINATE 5 MG/1
TABLET, FILM COATED ORAL
Qty: 90 TABLET | Refills: 3 | Status: SHIPPED | OUTPATIENT
Start: 2021-07-01 | End: 2022-05-24

## 2021-07-02 VITALS
DIASTOLIC BLOOD PRESSURE: 50 MMHG | BODY MASS INDEX: 44.41 KG/M2 | SYSTOLIC BLOOD PRESSURE: 147 MMHG | HEIGHT: 68 IN | HEART RATE: 61 BPM | TEMPERATURE: 97.3 F | WEIGHT: 293 LBS

## 2021-07-02 VITALS
BODY MASS INDEX: 44.41 KG/M2 | HEART RATE: 67 BPM | WEIGHT: 293 LBS | SYSTOLIC BLOOD PRESSURE: 145 MMHG | HEIGHT: 68 IN | TEMPERATURE: 97 F | DIASTOLIC BLOOD PRESSURE: 64 MMHG

## 2021-07-02 VITALS
HEART RATE: 58 BPM | HEIGHT: 68 IN | WEIGHT: 293 LBS | TEMPERATURE: 96.9 F | SYSTOLIC BLOOD PRESSURE: 124 MMHG | DIASTOLIC BLOOD PRESSURE: 54 MMHG | BODY MASS INDEX: 44.41 KG/M2

## 2021-07-21 DIAGNOSIS — E11.42 DIABETIC POLYNEUROPATHY ASSOCIATED WITH TYPE 2 DIABETES MELLITUS (HCC): Primary | ICD-10-CM

## 2021-07-21 RX ORDER — GABAPENTIN 400 MG/1
CAPSULE ORAL
Qty: 30 CAPSULE | Refills: 2 | Status: SHIPPED | OUTPATIENT
Start: 2021-07-21 | End: 2021-09-29

## 2021-07-28 ENCOUNTER — TELEPHONE (OUTPATIENT)
Dept: FAMILY MEDICINE CLINIC | Age: 61
End: 2021-07-28

## 2021-07-28 NOTE — TELEPHONE ENCOUNTER
Caller: Sarahi Palafox    Relationship: Self    Best call back number: [phone]  What is the medical concern/diagnosis: NOSE POLYP     What specialty or [facility] is [facility]

## 2021-07-29 DIAGNOSIS — J33.9 NASAL POLYP: Primary | ICD-10-CM

## 2021-08-13 ENCOUNTER — OFFICE VISIT (OUTPATIENT)
Dept: FAMILY MEDICINE CLINIC | Age: 61
End: 2021-08-13

## 2021-08-13 VITALS
DIASTOLIC BLOOD PRESSURE: 66 MMHG | HEIGHT: 68 IN | BODY MASS INDEX: 44.41 KG/M2 | WEIGHT: 293 LBS | TEMPERATURE: 98.2 F | HEART RATE: 70 BPM | SYSTOLIC BLOOD PRESSURE: 134 MMHG

## 2021-08-13 DIAGNOSIS — L89.311 PRESSURE INJURY OF RIGHT BUTTOCK, STAGE 1: ICD-10-CM

## 2021-08-13 DIAGNOSIS — M15.9 GENERALIZED OSTEOARTHRITIS: Primary | ICD-10-CM

## 2021-08-13 DIAGNOSIS — G47.33 OBSTRUCTIVE SLEEP APNEA: ICD-10-CM

## 2021-08-13 DIAGNOSIS — I10 ESSENTIAL HYPERTENSION: ICD-10-CM

## 2021-08-13 DIAGNOSIS — E11.42 TYPE 2 DIABETES MELLITUS WITH DIABETIC POLYNEUROPATHY, WITHOUT LONG-TERM CURRENT USE OF INSULIN (HCC): ICD-10-CM

## 2021-08-13 DIAGNOSIS — Z79.899 HIGH RISK MEDICATION USE: Primary | ICD-10-CM

## 2021-08-13 DIAGNOSIS — E78.2 MIXED HYPERLIPIDEMIA: ICD-10-CM

## 2021-08-13 DIAGNOSIS — Z79.899 HIGH RISK MEDICATION USE: ICD-10-CM

## 2021-08-13 PROBLEM — E55.9 VITAMIN D DEFICIENCY: Status: ACTIVE | Noted: 2021-08-13

## 2021-08-13 PROBLEM — K21.9 GASTROESOPHAGEAL REFLUX DISEASE WITHOUT ESOPHAGITIS: Status: ACTIVE | Noted: 2021-08-13

## 2021-08-13 LAB
AMPHET+METHAMPHET UR QL: NEGATIVE
AMPHETAMINES UR QL: NEGATIVE
BARBITURATES UR QL SCN: NEGATIVE
BENZODIAZ UR QL SCN: POSITIVE
BUPRENORPHINE SERPL-MCNC: NEGATIVE NG/ML
CANNABINOIDS SERPL QL: NEGATIVE
COCAINE UR QL: NEGATIVE
EXPIRATION DATE: ABNORMAL
Lab: ABNORMAL
MDMA UR QL SCN: NEGATIVE
METHADONE UR QL SCN: NEGATIVE
OPIATES UR QL: NEGATIVE
OXYCODONE UR QL SCN: NEGATIVE
PCP UR QL SCN: NEGATIVE

## 2021-08-13 PROCEDURE — 80305 DRUG TEST PRSMV DIR OPT OBS: CPT | Performed by: FAMILY MEDICINE

## 2021-08-13 PROCEDURE — 99214 OFFICE O/P EST MOD 30 MIN: CPT | Performed by: FAMILY MEDICINE

## 2021-08-13 PROCEDURE — G0480 DRUG TEST DEF 1-7 CLASSES: HCPCS | Performed by: FAMILY MEDICINE

## 2021-08-13 NOTE — ASSESSMENT & PLAN NOTE
BP at goal.  Stable on atenolol/chlorthalidone.  No refills needed today.  Labs reviewed and up-to-date.

## 2021-08-13 NOTE — ASSESSMENT & PLAN NOTE
Stable on current regimen.  Symptoms are well controlled.  No adverse effects. She does require ongoing use of this controlled substance to function.  Tox screen was due today.  Prior tox screen appropriate.  Gregg was run today.  Refills were not needed today.  RTC 3 months.

## 2021-08-13 NOTE — PROGRESS NOTES
Chief Complaint  Osteoarthritis (3 month follow up )    Subjective          Sarahi Palafox presents to [facility] today for routine f/u of chronic issues.    She is on tramadol and diclofenac for generalized OA.  Pain is worst in the bilateral shoulders and knees.  She uses the tramadol 1-2 tabs per day.  She uses a cane to ambulate.  She follows with Dr. Cisneros but is not felt to be a surgical candidate currently.  Has tried Synvisc but this did not help.  No adverse effects.     She is on metformin and glimepiride for diabetes.   She is UTD on eye exam (3/2021) done at [place].  She is UTD on foot exam (9/2020). She is on a statin.   She is on gabapentin for diabetic peripheral neuropathy.  Pain is fairly well controlled.  No adverse effects.    She is on atenolol/chlorthalidone for HTN.  BP has been well controlled at home.  No CP, SOB, palpitations.    She is on atorvastatin for HLD.  No myalgias or other adverse effects.     She is on Vesicare for urge incontinence.  Urinary sx well controlled.    She is on omeprazole for GERD.  No reflux or indigestion.    She uses her CPAP religiously every night.      Current Outpatient Medications:   •  atenolol-chlorthalidone (TENORETIC) 100-25 MG per tablet, TAKE ONE TABLET BY MOUTH DAILY, Disp: 90 tablet, Rfl: 1  •  atorvastatin (LIPITOR) 20 MG tablet, Take 20 mg by mouth Daily., Disp: , Rfl:   •  diclofenac (VOLTAREN) 75 MG EC tablet, TAKE ONE TABLET BY MOUTH TWICE A DAY WITH FOOD, Disp: 60 tablet, Rfl: 3  •  fluticasone (FLONASE) 50 MCG/ACT nasal spray, , Disp: , Rfl:   •  gabapentin (NEURONTIN) 400 MG capsule, TAKE ONE CAPSULE BY MOUTH EVERY NIGHT AT BEDTIME, Disp: 30 capsule, Rfl: 2  •  glimepiride (AMARYL) 1 MG tablet, Take 1 tablet by mouth Every Morning Before Breakfast., Disp: 90 tablet, Rfl: 1  •  metFORMIN (GLUCOPHAGE) 1000 MG tablet, TAKE ONE TABLET BY MOUTH TWICE A DAY, Disp: 180 tablet, Rfl: 1  •  omeprazole (priLOSEC) 20 MG  "capsule, , Disp: , Rfl:   •  solifenacin (VESICARE) 5 MG tablet, TAKE ONE TABLET BY MOUTH DAILY, Disp: 90 tablet, Rfl: 3  •  traMADol (ULTRAM) 50 MG tablet, Take 50 mg by mouth Every 8 (Eight) Hours As Needed., Disp: , Rfl:   •  silver sulfadiazine (Silvadene) 1 % cream, Apply  topically to the appropriate area as directed 2 (Two) Times a Day., Disp: 85 g, Rfl: 0    Allergies:  Penicillins      Objective   Vital Signs:   /66 (BP Location: Left arm, Patient Position: Sitting)   Pulse 70   Temp 98.2 °F (36.8 °C) (Oral)   Ht 172.7 cm (68\")   Wt (!) 175 kg (385 lb)   BMI 58.54 kg/m²     Physical Exam  Vitals reviewed.   Constitutional:       General: She is not in acute distress.     Appearance: Normal appearance. She is well-developed.   HENT:      Head: Normocephalic and atraumatic.      Right Ear: External ear normal.      Left Ear: External ear normal.      Nose: Nose normal.      Mouth/Throat:      Mouth: Mucous membranes are moist.   Eyes:      Extraocular Movements: Extraocular movements intact.      Conjunctiva/sclera: Conjunctivae normal.      Pupils: Pupils are equal, round, and reactive to light.   Cardiovascular:      Rate and Rhythm: Normal rate and regular rhythm.      Heart sounds: No murmur heard.     Pulmonary:      Effort: Pulmonary effort is normal.      Breath sounds: Normal breath sounds. No wheezing, rhonchi or rales.   Abdominal:      General: Bowel sounds are normal. There is no distension.      Palpations: Abdomen is soft.      Tenderness: There is no abdominal tenderness.   Musculoskeletal:         General: Normal range of motion.   Neurological:      Mental Status: She is alert.   Psychiatric:         Mood and Affect: Mood and affect normal.         Behavior: Behavior normal.         Thought Content: Thought content normal.         Judgment: Judgment normal.             Assessment and Plan    Diagnoses and all orders for this visit:    1. Generalized osteoarthritis " (Primary)  Assessment & Plan:  Stable on current regimen.  Symptoms are well controlled.  No adverse effects. She does require ongoing use of this controlled substance to function.  Tox screen was due today.  Prior tox screen appropriate.  Gregg was run today.  Refills were not needed today.  RTC 3 months.        2. High risk medication use  -     POC Urine Drug Screen [facility]    3. Type 2 diabetes mellitus with diabetic polyneuropathy, without long-term current use of insulin (CMS/[facility])  Assessment & Plan:  She is on metformin and glimepiride for diabetes.  No refills needed she is UTD on eye exam (3/2021) done at [place].  She is UTD on foot exam (9/2020). She is on a statin.   She is on gabapentin for diabetic peripheral neuropathy.  Pain is fairly well controlled.  No adverse effects.      4. Essential hypertension  Assessment & Plan:  BP at goal.  Stable on atenolol/chlorthalidone.  No refills needed today.  Labs reviewed and up-to-date.      5. Mixed hyperlipidemia  Assessment & Plan:  Stable on atorvastatin 20 mg daily.  No refills needed.  Labs reviewed and up-to-date.      6. Pressure injury of right buttock, stage 1  -     silver sulfadiazine (Silvadene) 1 % cream; Apply  topically to the appropriate area as directed 2 (Two) Times a Day.  Dispense: 85 g; Refill: 0    7. Obstructive sleep apnea  Assessment & Plan:  Stable on CPAP.        Follow Up   No follow-ups on file.  Patient was given instructions and counseling regarding her condition or for health maintenance advice. Please see specific information pulled into the AVS if appropriate.     Answers for HPI/ROS submitted by the patient on 8/6/2021  Please describe your symptoms.: Follow-up appointment  Have you had these symptoms before?: No  How long have you been having these symptoms?: Greater than 2 weeks  Please list any medications you are currently taking for this condition.: 0  Please describe any probable cause for these symptoms. :  0  What is the primary reason for your visit?: Other

## 2021-08-13 NOTE — ASSESSMENT & PLAN NOTE
She is on metformin and glimepiride for diabetes.  No refills needed she is UTD on eye exam (3/2021) done at [place].  She is UTD on foot exam (9/2020). She is on a statin.   She is on gabapentin for diabetic peripheral neuropathy.  Pain is fairly well controlled.  No adverse effects.

## 2021-08-16 DIAGNOSIS — Z79.899 HIGH RISK MEDICATION USE: ICD-10-CM

## 2021-08-23 LAB — BENZODIAZ UR QL: NEGATIVE

## 2021-08-24 DIAGNOSIS — M15.9 GENERALIZED OSTEOARTHRITIS: Primary | ICD-10-CM

## 2021-08-25 RX ORDER — TRAMADOL HYDROCHLORIDE 50 MG/1
TABLET ORAL
Qty: 90 TABLET | Refills: 1 | Status: SHIPPED | OUTPATIENT
Start: 2021-08-25 | End: 2021-12-07

## 2021-08-25 RX ORDER — OMEPRAZOLE 20 MG/1
CAPSULE, DELAYED RELEASE ORAL
Qty: 90 CAPSULE | Refills: 1 | Status: SHIPPED | OUTPATIENT
Start: 2021-08-25 | End: 2022-01-19

## 2021-09-08 ENCOUNTER — OFFICE VISIT (OUTPATIENT)
Dept: OTOLARYNGOLOGY | Facility: CLINIC | Age: 61
End: 2021-09-08

## 2021-09-08 VITALS — HEIGHT: 68 IN | WEIGHT: 293 LBS | BODY MASS INDEX: 44.41 KG/M2 | TEMPERATURE: 97.2 F

## 2021-09-08 DIAGNOSIS — J34.89 NASAL OBSTRUCTION: ICD-10-CM

## 2021-09-08 DIAGNOSIS — J34.89 NASAL CAVITY MASS: Primary | ICD-10-CM

## 2021-09-08 DIAGNOSIS — J31.0 RHINITIS MEDICAMENTOSA: ICD-10-CM

## 2021-09-08 DIAGNOSIS — T48.5X5A RHINITIS MEDICAMENTOSA: ICD-10-CM

## 2021-09-08 PROCEDURE — 99204 OFFICE O/P NEW MOD 45 MIN: CPT | Performed by: OTOLARYNGOLOGY

## 2021-09-09 NOTE — PROGRESS NOTES
Patient Name: Sarahi Palafox   Visit Date: 2021   Patient ID: [id number]  Provider: Alberto Tavera MD    Sex: female  Location: [facility]   YOB: 1960  Location Address: [address] [place] [address], [address],?[place]?[address]    Primary Care Provider Kia Juarez MD  Location Phone: [phone]    Referring Provider: Kia Juarez,Ramon        Chief Complaint  Other (Nasal Polyp Left sided)    Subjective    History of Present Illness  Sarahi Palafox is a 61 y.o. female who presents to [facility] today as a consult from Kia Juarez,*.    She presents the clinic today for evaluation of several month history of nasal obstruction on the left side.  She reports that she has had lifelong issues with allergic rhinitis and sinusitis, and previously has been on nasal decongestants.  Most currently, she has been having obstruction only on the left side with intermittent bleeding.  She has been on a decongestant for this, and I discussed the fact that using this long-term is not a good idea.  She does intermittently use nasal saline irrigations.  She is on CPAP at night, informed her that she cannot breathe at all through the left side of her nose.  Her primary care physician noted a growth, and she was sent here for further evaluation.    Past Medical History:   Diagnosis Date   • Allergic    • Arthritis    • Dental disease     I'm in the process of getting dentures   • Diabetes mellitus (CMS/HCC)    • Gastroesophageal reflux disease without esophagitis    • Hyperlipidemia    • Hypertension    • Nasal polyp    • Nosebleed 2021    I haven't had one in a while   • Sleep apnea 2014    I use a CPAP machine since 2015       Past Surgical History:   Procedure Laterality Date   •  SECTION  ,  and    • TUBAL ABDOMINAL LIGATION           Current Outpatient  "Medications:   •  atenolol-chlorthalidone (TENORETIC) 100-25 MG per tablet, TAKE ONE TABLET BY MOUTH DAILY, Disp: 90 tablet, Rfl: 1  •  atorvastatin (LIPITOR) 20 MG tablet, Take 20 mg by mouth Daily., Disp: , Rfl:   •  diclofenac (VOLTAREN) 75 MG EC tablet, TAKE ONE TABLET BY MOUTH TWICE A DAY WITH FOOD, Disp: 60 tablet, Rfl: 3  •  fluticasone (FLONASE) 50 MCG/ACT nasal spray, , Disp: , Rfl:   •  gabapentin (NEURONTIN) 400 MG capsule, TAKE ONE CAPSULE BY MOUTH EVERY NIGHT AT BEDTIME, Disp: 30 capsule, Rfl: 2  •  glimepiride (AMARYL) 1 MG tablet, Take 1 tablet by mouth Every Morning Before Breakfast., Disp: 90 tablet, Rfl: 1  •  metFORMIN (GLUCOPHAGE) 1000 MG tablet, TAKE ONE TABLET BY MOUTH TWICE A DAY, Disp: 180 tablet, Rfl: 1  •  omeprazole (priLOSEC) 20 MG capsule, TAKE ONE CAPSULE BY MOUTH DAILY, Disp: 90 capsule, Rfl: 1  •  silver sulfadiazine (Silvadene) 1 % cream, Apply  topically to the appropriate area as directed 2 (Two) Times a Day., Disp: 85 g, Rfl: 0  •  solifenacin (VESICARE) 5 MG tablet, TAKE ONE TABLET BY MOUTH DAILY, Disp: 90 tablet, Rfl: 3  •  traMADol (ULTRAM) 50 MG tablet, TAKE ONE TABLET BY MOUTH THREE TIMES A DAY AS NEEDED, Disp: 90 tablet, Rfl: 1     Allergies   Allergen Reactions   • Penicillins Unknown - Low Severity       Family History   Problem Relation Age of Onset   • Alcohol abuse Father    • COPD Father    • Hypertension Father    • Arthritis Mother    • Diabetes Mother    • Asthma Daughter         Social History     Social History Narrative   • Not on file       Objective     Vital Signs:   Temp 97.2 °F (36.2 °C) (Temporal)   Ht 172.7 cm (68\")   Wt (!) 174 kg (382 lb 9.6 oz)   BMI 58.17 kg/m²       Physical Exam         Constitutional   Appearance  · : well developed, well-nourished, alert and in no acute distress, voice clear and strong    Head  Inspection  · : no deformities or lesions  Face  Inspection  · : No facial lesions; House-Brackmann I/VI bilaterally  Palpation  · : No " TMJ crepitus nor  muscle tenderness bilaterally    Eyes  Vision  Visual Fields  · : Extraocular movements are intact. No spontaneous or gaze-induced nystagmus.  Conjunctivae  · : clear  Sclerae  · : clear  Pupils and Irises  · : pupils equal, round, and reactive to light.     Ears, Nose, Mouth and Throat    Ears    External Ears  · : appearance within normal limits, no lesions present  Otoscopic Examination  · : Tympanic membrane appearance within normal limits bilaterally without perforations, well-aerated middle ears  Hearing  · : intact to conversational voice both ears  Tunning fork testing:     :    Nose    External Nose  · : appearance normal  Intranasal Exam  · : mucosa inflamed appearing on the left with a soft tissue mass filling the entire nasal passage  Oral Cavity    Oral Mucosa  · : oral mucosa normal without pallor or cyanosis  Lips  · : lip appearance normal  Teeth  · : normal dentition for age  Gums  · : gums pink, non-swollen, no bleeding present  Tongue  · : tongue appearance normal; normal mobility  Palate  · : hard palate normal, soft palate appearance normal with symmetric mobility    Throat    Oropharynx  · : no inflammation or lesions present, tonsils within normal limits  Hypopharynx  · : appearance within normal limits, superior epiglottis within normal limits  Larynx  · : appearance within normal limits, vocal cords within normal limits, no lesions present    Neck  Inspection/Palpation  · : normal appearance, no masses or tenderness, trachea midline; thyroid size normal, nontender, no nodules or masses present on palpation    Respiratory  Respiratory Effort  · : breathing unlabored  Inspection of Chest  · : normal appearance, no retractions    Cardiovascular  Heart  · : regular rate and rhythm    Lymphatic  Neck  · : no lymphadenopathy present  Supraclavicular Nodes  · : no lymphadenopathy present  Preauricular Nodes  · : no lymphadenopathy present    Skin and Subcutaneous  Tissue  General Inspection  · : Regarding face and neck - there are no rashes present, no lesions present, and no areas of discoloration    Neurologic  Cranial Nerves  · : cranial nerves II-XII are grossly intact bilaterally  Gait and Station  · : normal gait, able to stand without diffculty    Psychiatric  Judgement and Insight  · : judgment and insight intact  Mood and Affect  · : mood normal, affect appropriate          Assessment and Plan    Diagnoses and all orders for this visit:    1. Nasal cavity mass (Primary)    2. Rhinitis medicamentosa    3. Nasal obstruction    Examination today revealed mucosal congestion as well as a soft tissue mass filling the left nostril.  I carefully evaluated this using the microscope and suction, and based on the appearance I advised to start with a CT scan of the face and sinuses to better assess for the size of the mass.  I do think this will eventually need biopsy as it appears to be a neoplasm.  It is not soft like a polyp.  Possible pyogenic granuloma versus malignant mass.  I discussed this with her, and we will see her shortly after the CT scan.  I have also advised her to stop using the decongestant as this will likely contribute to worsening and possible rhinitis medicamentosa.    Follow Up   No follow-ups on file.  Patient was given instructions and counseling regarding her condition or for health maintenance advice. Please see specific information pulled into the AVS if appropriate.

## 2021-09-13 ENCOUNTER — HOSPITAL ENCOUNTER (OUTPATIENT)
Dept: CT IMAGING | Facility: HOSPITAL | Age: 61
Discharge: HOME | End: 2021-09-13
Admitting: OTOLARYNGOLOGY

## 2021-09-13 DIAGNOSIS — J34.89 NASAL CAVITY MASS: ICD-10-CM

## 2021-09-13 DIAGNOSIS — J34.89 NASAL OBSTRUCTION: ICD-10-CM

## 2021-09-13 DIAGNOSIS — J31.0 RHINITIS MEDICAMENTOSA: ICD-10-CM

## 2021-09-13 DIAGNOSIS — T48.5X5A RHINITIS MEDICAMENTOSA: ICD-10-CM

## 2021-09-13 PROCEDURE — 70486 CT MAXILLOFACIAL W/O DYE: CPT

## 2021-09-21 RX ORDER — FLUTICASONE PROPIONATE 50 MCG
SPRAY, SUSPENSION (ML) NASAL
Qty: 16 ML | Refills: 5 | Status: SHIPPED | OUTPATIENT
Start: 2021-09-21 | End: 2022-11-30

## 2021-09-21 RX ORDER — ATORVASTATIN CALCIUM 20 MG/1
TABLET, FILM COATED ORAL
Qty: 90 TABLET | Refills: 0 | Status: SHIPPED | OUTPATIENT
Start: 2021-09-21 | End: 2022-01-04

## 2021-09-29 ENCOUNTER — OFFICE VISIT (OUTPATIENT)
Dept: OTOLARYNGOLOGY | Facility: CLINIC | Age: 61
End: 2021-09-29

## 2021-09-29 VITALS — RESPIRATION RATE: 16 BRPM | HEIGHT: 68 IN | WEIGHT: 293 LBS | BODY MASS INDEX: 44.41 KG/M2

## 2021-09-29 DIAGNOSIS — J34.89 NASAL CAVITY MASS: Primary | ICD-10-CM

## 2021-09-29 DIAGNOSIS — J32.4 CHRONIC PANSINUSITIS: ICD-10-CM

## 2021-09-29 DIAGNOSIS — J34.89 NASAL OBSTRUCTION: ICD-10-CM

## 2021-09-29 PROCEDURE — 99214 OFFICE O/P EST MOD 30 MIN: CPT | Performed by: OTOLARYNGOLOGY

## 2021-09-29 RX ORDER — MULTIVIT-MIN/IRON/FOLIC ACID/K 18-600-40
2000 CAPSULE ORAL DAILY
COMMUNITY

## 2021-09-29 RX ORDER — GEMFIBROZIL 600 MG/1
600 TABLET, FILM COATED ORAL DAILY
COMMUNITY

## 2021-09-29 RX ORDER — GABAPENTIN 100 MG/1
100 CAPSULE ORAL NIGHTLY PRN
COMMUNITY
End: 2021-11-19

## 2021-09-29 RX ORDER — LORATADINE 10 MG/1
10 TABLET ORAL DAILY
COMMUNITY

## 2021-09-29 NOTE — PROGRESS NOTES
Patient Name: Sarahi Palafox   Visit Date: 2021   Patient ID: [id number]  Provider: Alberto Tavera MD    Sex: female  [place] [facility]   YOB: 1960  Location Address: [address] [place] [address], [address],?[place]?[address]    Primary Care Provider Kia Juarez MD  Location Phone: [phone]    Referring Provider: No ref. provider found        Chief Complaint  Follow-up (CT results)    Subjective    History of Present Illness  Sarahi Palafox is a 61 y.o. female who presents to [facility] today as a consult from No ref. provider found.    She presents the clinic today for follow-up regarding left-sided nasal mass with nasal obstruction.  She notes no significant changes since last time I saw her, continues to have nasal obstruction and difficulty breathing through the left nostril as well as rhinorrhea.  She has had no episodes of bleeding.  At the last clinic visit I diagnosed her with a nasal mass and a CT scan was performed for further evaluation of this.  I reviewed the imaging today, this revealed an anterior mass obstructing the entire left nasal cavity which appears to be emanating from either the inferior middle turbinate.  There is obstruction of the ostiomeatal complex with resulting left maxillary, ethmoid, and frontal sinusitis.  I discussed the findings with her.    Past Medical History:   Diagnosis Date   • Allergic    • Arthritis    • Dental disease     I'm in the process of getting dentures   • Diabetes mellitus (CMS/HCC)    • Gastroesophageal reflux disease without esophagitis    • Hyperlipidemia    • Hypertension    • Nasal polyp    • Nosebleed 2021    I haven't had one in a while   • Sleep apnea 2014    I use a CPAP machine since 2015       Past Surgical History:   Procedure Laterality Date   •  SECTION  ,  and    • TUBAL ABDOMINAL LIGATION  " 1992         Current Outpatient Medications:   •  loratadine (CLARITIN) 10 MG tablet, Take 10 mg by mouth Daily., Disp: , Rfl:   •  atenolol-chlorthalidone (TENORETIC) 100-25 MG per tablet, TAKE ONE TABLET BY MOUTH DAILY, Disp: 90 tablet, Rfl: 1  •  atorvastatin (LIPITOR) 20 MG tablet, TAKE ONE TABLET BY MOUTH DAILY, Disp: 90 tablet, Rfl: 0  •  diclofenac (VOLTAREN) 75 MG EC tablet, TAKE ONE TABLET BY MOUTH TWICE A DAY WITH FOOD, Disp: 60 tablet, Rfl: 3  •  fluticasone (FLONASE) 50 MCG/ACT nasal spray, SPRAY ONE SPRAY IN EACH NOSTRIL ONCE DAILY, Disp: 16 mL, Rfl: 5  •  gabapentin (NEURONTIN) 400 MG capsule, TAKE ONE CAPSULE BY MOUTH EVERY NIGHT AT BEDTIME, Disp: 30 capsule, Rfl: 2  •  glimepiride (AMARYL) 1 MG tablet, Take 1 tablet by mouth Every Morning Before Breakfast., Disp: 90 tablet, Rfl: 1  •  metFORMIN (GLUCOPHAGE) 1000 MG tablet, TAKE ONE TABLET BY MOUTH TWICE A DAY, Disp: 180 tablet, Rfl: 1  •  omeprazole (priLOSEC) 20 MG capsule, TAKE ONE CAPSULE BY MOUTH DAILY, Disp: 90 capsule, Rfl: 1  •  silver sulfadiazine (Silvadene) 1 % cream, Apply  topically to the appropriate area as directed 2 (Two) Times a Day., Disp: 85 g, Rfl: 0  •  solifenacin (VESICARE) 5 MG tablet, TAKE ONE TABLET BY MOUTH DAILY, Disp: 90 tablet, Rfl: 3  •  traMADol (ULTRAM) 50 MG tablet, TAKE ONE TABLET BY MOUTH THREE TIMES A DAY AS NEEDED, Disp: 90 tablet, Rfl: 1     Allergies   Allergen Reactions   • Penicillins Rash       Family History   Problem Relation Age of Onset   • Alcohol abuse Father    • COPD Father    • Hypertension Father    • Arthritis Mother    • Diabetes Mother    • Asthma Daughter         Social History     Social History Narrative   • Not on file       Objective     Vital Signs:   Resp 16   Ht 172.7 cm (68\")   Wt (!) 171 kg (378 lb)   BMI 57.47 kg/m²       Physical Exam         Constitutional   Appearance  · : well developed, well-nourished, alert and in no acute distress, voice clear and " strong    Head  Inspection  · : no deformities or lesions  Face  Inspection  · : No facial lesions; House-Brackmann I/VI bilaterally  Palpation  · : No TMJ crepitus nor  muscle tenderness bilaterally    Eyes  Vision  Visual Fields  · : Extraocular movements are intact. No spontaneous or gaze-induced nystagmus.  Conjunctivae  · : clear  Sclerae  · : clear  Pupils and Irises  · : pupils equal, round, and reactive to light.     Ears, Nose, Mouth and Throat    Ears    External Ears  · : appearance within normal limits, no lesions present  Otoscopic Examination  · : Tympanic membrane appearance within normal limits bilaterally without perforations, well-aerated middle ears  Hearing  · : intact to conversational voice both ears  Tunning fork testing:     :    Nose    External Nose  · : appearance normal  Intranasal Exam  · : mucosa congested with a stable large left nasal cavity mass obstructing the entire left nostril, vestibules normal, no intranasal lesions present, septum midline, sinuses non tender to percussion  Oral Cavity    Oral Mucosa  · : oral mucosa normal without pallor or cyanosis  Lips  · : lip appearance normal  Teeth  · : normal dentition for age  Gums  · : gums pink, non-swollen, no bleeding present  Tongue  · : tongue appearance normal; normal mobility  Palate  · : hard palate normal, soft palate appearance normal with symmetric mobility    Throat    Oropharynx  · : no inflammation or lesions present, tonsils within normal limits  Hypopharynx  · : appearance within normal limits, superior epiglottis within normal limits  Larynx  · : appearance within normal limits, vocal cords within normal limits, no lesions present    Neck  Inspection/Palpation  · : normal appearance, no masses or tenderness, trachea midline; thyroid size normal, nontender, no nodules or masses present on palpation    Respiratory  Respiratory Effort  · : breathing unlabored  Inspection of Chest  · : normal appearance, no  retractions    Cardiovascular  Heart  · : regular rate and rhythm    Lymphatic  Neck  · : no lymphadenopathy present  Supraclavicular Nodes  · : no lymphadenopathy present  Preauricular Nodes  · : no lymphadenopathy present    Skin and Subcutaneous Tissue  General Inspection  · : Regarding face and neck - there are no rashes present, no lesions present, and no areas of discoloration    Neurologic  Cranial Nerves  · : cranial nerves II-XII are grossly intact bilaterally  Gait and Station  · : normal gait, able to stand without diffculty    Psychiatric  Judgement and Insight  · : judgment and insight intact  Mood and Affect  · : mood normal, affect appropriate          Assessment and Plan    Diagnoses and all orders for this visit:    1. Nasal cavity mass (Primary)  -     Case Request; Standing  -     Case Request    2. Chronic pansinusitis  -     Case Request; Standing  -     Case Request    3. Nasal obstruction  -     Case Request; Standing  -     Case Request    Other orders  -     Follow Anesthesia Guidelines / Protocol; Future  -     Obtain Informed Consent; Future  -     Follow Anesthesia Guidelines / Protocol; Standing  -     NPO Diet; Standing    Examination today revealed a stable left-sided mass obstructing the entire left nostril.  I reviewed the imaging results and based on the clinical exam and imaging I did recommend proceeding to the operating room for excision the endoscopic transnasal approach.  I discussed the surgery with her and her  at length, including the possible complications and alternatives.  She notes that she understands, and would like to proceed.  I will make arrangements at the scheduled for her in the near future.    Follow Up   No follow-ups on file.  Patient was given instructions and counseling regarding her condition or for health maintenance advice. Please see specific information pulled into the AVS if appropriate.

## 2021-10-04 ENCOUNTER — ANESTHESIA (OUTPATIENT)
Dept: PERIOP | Facility: HOSPITAL | Age: 61
End: 2021-10-04

## 2021-10-04 ENCOUNTER — ANESTHESIA EVENT (OUTPATIENT)
Dept: PERIOP | Facility: HOSPITAL | Age: 61
End: 2021-10-04

## 2021-10-04 ENCOUNTER — HOSPITAL ENCOUNTER (OUTPATIENT)
Facility: HOSPITAL | Age: 61
Setting detail: HOSPITAL OUTPATIENT SURGERY
Discharge: HOME | End: 2021-10-04
Attending: OTOLARYNGOLOGY | Admitting: OTOLARYNGOLOGY

## 2021-10-04 VITALS
OXYGEN SATURATION: 98 % | HEART RATE: 90 BPM | DIASTOLIC BLOOD PRESSURE: 82 MMHG | BODY MASS INDEX: 44.41 KG/M2 | RESPIRATION RATE: 16 BRPM | TEMPERATURE: 97.5 F | SYSTOLIC BLOOD PRESSURE: 160 MMHG | HEIGHT: 68 IN | WEIGHT: 293 LBS

## 2021-10-04 DIAGNOSIS — J32.4 CHRONIC PANSINUSITIS: ICD-10-CM

## 2021-10-04 DIAGNOSIS — J34.89 NASAL CAVITY MASS: ICD-10-CM

## 2021-10-04 DIAGNOSIS — J34.89 NASAL OBSTRUCTION: ICD-10-CM

## 2021-10-04 LAB
ANION GAP SERPL CALCULATED.3IONS-SCNC: 18.1 MMOL/L (ref 5–15)
BUN SERPL-MCNC: 16 MG/DL (ref 8–23)
BUN/CREAT SERPL: 17.8 (ref 7–25)
CALCIUM SPEC-SCNC: 9.4 MG/DL (ref 8.6–10.5)
CHLORIDE SERPL-SCNC: 95 MMOL/L (ref 98–107)
CO2 SERPL-SCNC: 24.9 MMOL/L (ref 22–29)
CREAT SERPL-MCNC: 0.9 MG/DL (ref 0.57–1)
GFR SERPL CREATININE-BSD FRML MDRD: 64 ML/MIN/1.73
GLUCOSE BLDC GLUCOMTR-MCNC: 165 MG/DL (ref 70–99)
GLUCOSE SERPL-MCNC: 146 MG/DL (ref 65–99)
POTASSIUM SERPL-SCNC: 3.5 MMOL/L (ref 3.5–5.2)
SODIUM SERPL-SCNC: 138 MMOL/L (ref 136–145)

## 2021-10-04 PROCEDURE — 25010000002 ONDANSETRON PER 1 MG: Performed by: NURSE ANESTHETIST, CERTIFIED REGISTERED

## 2021-10-04 PROCEDURE — 25010000002 HYDROMORPHONE 1 MG/ML SOLUTION: Performed by: NURSE ANESTHETIST, CERTIFIED REGISTERED

## 2021-10-04 PROCEDURE — 25010000002 FENTANYL CITRATE (PF) 50 MCG/ML SOLUTION: Performed by: NURSE ANESTHETIST, CERTIFIED REGISTERED

## 2021-10-04 PROCEDURE — 88305 TISSUE EXAM BY PATHOLOGIST: CPT | Performed by: OTOLARYNGOLOGY

## 2021-10-04 PROCEDURE — 80048 BASIC METABOLIC PNL TOTAL CA: CPT | Performed by: OTOLARYNGOLOGY

## 2021-10-04 PROCEDURE — 93005 ELECTROCARDIOGRAM TRACING: CPT | Performed by: OTOLARYNGOLOGY

## 2021-10-04 PROCEDURE — 82962 GLUCOSE BLOOD TEST: CPT

## 2021-10-04 PROCEDURE — 25010000002 PROPOFOL 10 MG/ML EMULSION: Performed by: NURSE ANESTHETIST, CERTIFIED REGISTERED

## 2021-10-04 PROCEDURE — 25010000002 DEXAMETHASONE PER 1 MG: Performed by: NURSE ANESTHETIST, CERTIFIED REGISTERED

## 2021-10-04 RX ORDER — MEPERIDINE HYDROCHLORIDE 25 MG/ML
12.5 INJECTION INTRAMUSCULAR; INTRAVENOUS; SUBCUTANEOUS
Status: DISCONTINUED | OUTPATIENT
Start: 2021-10-04 | End: 2021-10-04 | Stop reason: HOSPADM

## 2021-10-04 RX ORDER — OXYCODONE HYDROCHLORIDE 5 MG/1
5 TABLET ORAL
Status: DISCONTINUED | OUTPATIENT
Start: 2021-10-04 | End: 2021-10-04 | Stop reason: HOSPADM

## 2021-10-04 RX ORDER — ONDANSETRON 2 MG/ML
INJECTION INTRAMUSCULAR; INTRAVENOUS AS NEEDED
Status: DISCONTINUED | OUTPATIENT
Start: 2021-10-04 | End: 2021-10-04 | Stop reason: SURG

## 2021-10-04 RX ORDER — LIDOCAINE HYDROCHLORIDE 20 MG/ML
INJECTION, SOLUTION INFILTRATION; PERINEURAL AS NEEDED
Status: DISCONTINUED | OUTPATIENT
Start: 2021-10-04 | End: 2021-10-04 | Stop reason: SURG

## 2021-10-04 RX ORDER — MIDAZOLAM HYDROCHLORIDE 2 MG/2ML
2 INJECTION, SOLUTION INTRAMUSCULAR; INTRAVENOUS ONCE
Status: DISCONTINUED | OUTPATIENT
Start: 2021-10-04 | End: 2021-10-04 | Stop reason: HOSPADM

## 2021-10-04 RX ORDER — HYDROCODONE BITARTRATE AND ACETAMINOPHEN 5; 325 MG/1; MG/1
1-2 TABLET ORAL EVERY 4 HOURS PRN
Qty: 20 TABLET | Refills: 0 | Status: SHIPPED | OUTPATIENT
Start: 2021-10-04 | End: 2021-11-19

## 2021-10-04 RX ORDER — SCOLOPAMINE TRANSDERMAL SYSTEM 1 MG/1
1 PATCH, EXTENDED RELEASE TRANSDERMAL CONTINUOUS
Status: DISCONTINUED | OUTPATIENT
Start: 2021-10-04 | End: 2021-10-04 | Stop reason: HOSPADM

## 2021-10-04 RX ORDER — FENTANYL CITRATE 50 UG/ML
INJECTION, SOLUTION INTRAMUSCULAR; INTRAVENOUS AS NEEDED
Status: DISCONTINUED | OUTPATIENT
Start: 2021-10-04 | End: 2021-10-04 | Stop reason: SURG

## 2021-10-04 RX ORDER — SODIUM CHLORIDE, SODIUM LACTATE, POTASSIUM CHLORIDE, CALCIUM CHLORIDE 600; 310; 30; 20 MG/100ML; MG/100ML; MG/100ML; MG/100ML
9 INJECTION, SOLUTION INTRAVENOUS CONTINUOUS PRN
Status: DISCONTINUED | OUTPATIENT
Start: 2021-10-04 | End: 2021-10-04 | Stop reason: HOSPADM

## 2021-10-04 RX ORDER — ROCURONIUM BROMIDE 10 MG/ML
INJECTION, SOLUTION INTRAVENOUS AS NEEDED
Status: DISCONTINUED | OUTPATIENT
Start: 2021-10-04 | End: 2021-10-04 | Stop reason: SURG

## 2021-10-04 RX ORDER — PROPOFOL 10 MG/ML
VIAL (ML) INTRAVENOUS AS NEEDED
Status: DISCONTINUED | OUTPATIENT
Start: 2021-10-04 | End: 2021-10-04 | Stop reason: SURG

## 2021-10-04 RX ORDER — PROMETHAZINE HYDROCHLORIDE 25 MG/1
25 SUPPOSITORY RECTAL ONCE AS NEEDED
Status: DISCONTINUED | OUTPATIENT
Start: 2021-10-04 | End: 2021-10-04 | Stop reason: HOSPADM

## 2021-10-04 RX ORDER — LIDOCAINE HYDROCHLORIDE AND EPINEPHRINE 10; 10 MG/ML; UG/ML
INJECTION, SOLUTION INFILTRATION; PERINEURAL AS NEEDED
Status: DISCONTINUED | OUTPATIENT
Start: 2021-10-04 | End: 2021-10-04 | Stop reason: HOSPADM

## 2021-10-04 RX ORDER — DEXAMETHASONE SODIUM PHOSPHATE 4 MG/ML
INJECTION, SOLUTION INTRA-ARTICULAR; INTRALESIONAL; INTRAMUSCULAR; INTRAVENOUS; SOFT TISSUE AS NEEDED
Status: DISCONTINUED | OUTPATIENT
Start: 2021-10-04 | End: 2021-10-04 | Stop reason: SURG

## 2021-10-04 RX ORDER — OXYMETAZOLINE HYDROCHLORIDE 0.05 G/100ML
SPRAY NASAL AS NEEDED
Status: DISCONTINUED | OUTPATIENT
Start: 2021-10-04 | End: 2021-10-04 | Stop reason: HOSPADM

## 2021-10-04 RX ORDER — PROMETHAZINE HYDROCHLORIDE 12.5 MG/1
25 TABLET ORAL ONCE AS NEEDED
Status: DISCONTINUED | OUTPATIENT
Start: 2021-10-04 | End: 2021-10-04 | Stop reason: HOSPADM

## 2021-10-04 RX ORDER — ACETAMINOPHEN 500 MG
1000 TABLET ORAL ONCE
Status: COMPLETED | OUTPATIENT
Start: 2021-10-04 | End: 2021-10-04

## 2021-10-04 RX ORDER — ONDANSETRON 2 MG/ML
4 INJECTION INTRAMUSCULAR; INTRAVENOUS ONCE AS NEEDED
Status: DISCONTINUED | OUTPATIENT
Start: 2021-10-04 | End: 2021-10-04 | Stop reason: HOSPADM

## 2021-10-04 RX ORDER — GINSENG 100 MG
CAPSULE ORAL AS NEEDED
Status: DISCONTINUED | OUTPATIENT
Start: 2021-10-04 | End: 2021-10-04 | Stop reason: HOSPADM

## 2021-10-04 RX ADMIN — SUGAMMADEX 200 MG: 100 INJECTION, SOLUTION INTRAVENOUS at 10:11

## 2021-10-04 RX ADMIN — HYDROMORPHONE HYDROCHLORIDE 0.25 MG: 1 INJECTION, SOLUTION INTRAMUSCULAR; INTRAVENOUS; SUBCUTANEOUS at 10:49

## 2021-10-04 RX ADMIN — ROCURONIUM BROMIDE 20 MG: 10 INJECTION INTRAVENOUS at 09:56

## 2021-10-04 RX ADMIN — ONDANSETRON 4 MG: 2 INJECTION INTRAMUSCULAR; INTRAVENOUS at 09:24

## 2021-10-04 RX ADMIN — SCOPALAMINE 1 PATCH: 1 PATCH, EXTENDED RELEASE TRANSDERMAL at 08:35

## 2021-10-04 RX ADMIN — SODIUM CHLORIDE, POTASSIUM CHLORIDE, SODIUM LACTATE AND CALCIUM CHLORIDE 9 ML/HR: 600; 310; 30; 20 INJECTION, SOLUTION INTRAVENOUS at 08:33

## 2021-10-04 RX ADMIN — DEXAMETHASONE SODIUM PHOSPHATE 4 MG: 4 INJECTION INTRA-ARTICULAR; INTRALESIONAL; INTRAMUSCULAR; INTRAVENOUS; SOFT TISSUE at 09:24

## 2021-10-04 RX ADMIN — FENTANYL CITRATE 100 MCG: 50 INJECTION INTRAMUSCULAR; INTRAVENOUS at 09:05

## 2021-10-04 RX ADMIN — ACETAMINOPHEN 1000 MG: 500 TABLET ORAL at 08:33

## 2021-10-04 RX ADMIN — LIDOCAINE HYDROCHLORIDE 50 MG: 20 INJECTION, SOLUTION INFILTRATION; PERINEURAL at 09:05

## 2021-10-04 RX ADMIN — OXYCODONE HYDROCHLORIDE 5 MG: 5 TABLET ORAL at 11:07

## 2021-10-04 RX ADMIN — ONDANSETRON 4 MG: 2 INJECTION INTRAMUSCULAR; INTRAVENOUS at 10:49

## 2021-10-04 RX ADMIN — ROCURONIUM BROMIDE 50 MG: 10 INJECTION INTRAVENOUS at 09:05

## 2021-10-04 RX ADMIN — PROPOFOL 300 MG: 10 INJECTION, EMULSION INTRAVENOUS at 09:05

## 2021-10-04 NOTE — ANESTHESIA PREPROCEDURE EVALUATION
Anesthesia Evaluation     Patient summary reviewed and Nursing notes reviewed   history of anesthetic complications: PONV  NPO Solid Status: > 8 hours  NPO Liquid Status: > 2 hours           Airway   Mallampati: IV  TM distance: >3 FB  Neck ROM: full  No difficulty expected, Difficult intubation highly probable, Large neck circumference and Anterior  Dental - normal exam   (+) upper dentures        Pulmonary - normal exam   (+) sleep apnea on CPAP,   (-) COPD, asthma, not a smoker    ROS comment: Sinusitis  Nasal mass  Cardiovascular - normal exam  Exercise tolerance: unable to assess (Unable to assess METs due to knee arthritis)    ECG reviewed    (+) hypertension, hyperlipidemia,   (-) past MI      Neuro/Psych  (+) numbness (diabetic neuropathy),     GI/Hepatic/Renal/Endo    (+) obesity, morbid obesity, GERD,  diabetes mellitus type 2,     Musculoskeletal     Abdominal   (+) obese,    Substance History - negative use     OB/GYN negative ob/gyn ROS         Other   arthritis,                    Anesthesia Plan    ASA 3     general   (Patient understands anesthesia not responsible for dental damage.)  intravenous induction     Anesthetic plan, all risks, benefits, and alternatives have been provided, discussed and informed consent has been obtained with: patient.

## 2021-10-04 NOTE — ANESTHESIA POSTPROCEDURE EVALUATION
Patient: Sarahi Palafox    Procedure Summary     Date: 10/04/21 Room / Location: [facility] OR 02 / [facility] MAIN OR    Anesthesia Start: 0856 Anesthesia Stop: 1026    Procedure: ENDOSCOPIC FUNCTIONAL SINUS SURGERY with excision of left nasal cavity mass (Left Nose) Diagnosis:       Nasal cavity mass      Chronic pansinusitis      Nasal obstruction      (Nasal cavity mass [J34.89])      (Chronic pansinusitis [J32.4])      (Nasal obstruction [J34.89])    Surgeons: Alberto Tavera MD Provider: Matt Moon MD    Anesthesia Type: general ASA Status: 3          Anesthesia Type: general    Vitals  Vitals Value Taken Time   /65 10/04/21 1036   Temp 36.9 °C (98.5 °F) 10/04/21 1023   Pulse 92 10/04/21 1038   Resp 8 10/04/21 1025   SpO2 97 % 10/04/21 1038   Vitals shown include unvalidated device data.        Post Anesthesia Care and Evaluation    Patient location during evaluation: bedside  Patient participation: complete - patient participated  Level of consciousness: awake  Pain management: adequate  Airway patency: patent  Anesthetic complications: No anesthetic complications  PONV Status: none  Cardiovascular status: acceptable and stable  Respiratory status: acceptable and room air  Hydration status: acceptable    Comments: An Anesthesiologist personally participated in the most demanding procedures (including induction and emergence if applicable) in the anesthesia plan, monitored the course of anesthesia administration at frequent intervals and remained physically present and available for immediate diagnosis and treatment of emergencies.

## 2021-10-05 LAB
CYTO UR: NORMAL
LAB AP CASE REPORT: NORMAL
LAB AP CLINICAL INFORMATION: NORMAL
PATH REPORT.FINAL DX SPEC: NORMAL
PATH REPORT.GROSS SPEC: NORMAL
REF LAB TEST METHOD: NORMAL

## 2021-10-21 LAB — QT INTERVAL: 356 MS

## 2021-10-27 ENCOUNTER — OFFICE VISIT (OUTPATIENT)
Dept: OTOLARYNGOLOGY | Facility: CLINIC | Age: 61
End: 2021-10-27

## 2021-10-27 VITALS — RESPIRATION RATE: 16 BRPM | HEIGHT: 68 IN | BODY MASS INDEX: 44.41 KG/M2 | WEIGHT: 293 LBS

## 2021-10-27 DIAGNOSIS — J32.4 CHRONIC PANSINUSITIS: ICD-10-CM

## 2021-10-27 DIAGNOSIS — J34.89 NASAL CAVITY MASS: Primary | ICD-10-CM

## 2021-10-27 DIAGNOSIS — E11.42 DIABETIC POLYNEUROPATHY ASSOCIATED WITH TYPE 2 DIABETES MELLITUS (HCC): ICD-10-CM

## 2021-10-27 DIAGNOSIS — J34.89 NASAL OBSTRUCTION: ICD-10-CM

## 2021-10-27 PROCEDURE — 99024 POSTOP FOLLOW-UP VISIT: CPT | Performed by: OTOLARYNGOLOGY

## 2021-10-27 PROCEDURE — 31237 NSL/SINS NDSC SURG BX POLYPC: CPT | Performed by: OTOLARYNGOLOGY

## 2021-10-27 NOTE — PROGRESS NOTES
Patient Name: Saraih Palafox   Visit Date: 10/27/2021   Patient ID: [id number]  Provider: Alberto Tavera MD    Sex: female  Location: [facility]   YOB: 1960  Location Address: [address] [place] [address], [address] [place] [address],?[id number]    Primary Care Provider Kia Juarez MD  Location Phone: [phone]    Referring Provider: No ref. provider found        Chief Complaint  Post-op    Subjective    History of Present Illness  Sarahi Palafox is a 61 y.o. female who presents to [facility] today as a consult from No ref. provider found.    She presents the clinic today for follow-up regarding giant left nasal mass for which he underwent endoscopic sinus surgery and excision 3 weeks ago.  She notes that she has been doing well, and has had no issues postoperatively.  She denies any bleeding or pain issues.  She is breathing much better through her nose, especially the left side.  She is doing nasal saline gel, and has had some crusting.    Pathology shows a pyogenic granuloma.    Past Medical History:   Diagnosis Date   • Allergic    • Arthritis    • Dental disease     I'm in the process of getting dentures   • Diabetes mellitus (HCC)    • Eye drainage     left eye   • Gastroesophageal reflux disease without esophagitis    • Hyperlipidemia    • Hypertension    • Nasal cavity mass    • Nasal polyp    • Neuropathy     feet and hands   • Nosebleed 2021    I haven't had one in a while   • PONV (postoperative nausea and vomiting)    • Sleep apnea 2014    I use a CPAP machine since 2015       Past Surgical History:   Procedure Laterality Date   •  SECTION  ,  and    • ENDOSCOPIC FUNCTIONAL SINUS SURGERY (FESS) Left 10/4/2021    Procedure: ENDOSCOPIC FUNCTIONAL SINUS SURGERY with excision of left nasal cavity mass;  Surgeon: Alberto Tavera MD;  Location: [facility];   Service: ENT;  Laterality: Left;   • KNEE SURGERY Left    • TUBAL ABDOMINAL LIGATION  1992         Current Outpatient Medications:   •  atenolol-chlorthalidone (TENORETIC) 100-25 MG per tablet, TAKE ONE TABLET BY MOUTH DAILY (Patient taking differently: Take 1 tablet by mouth Daily.), Disp: 90 tablet, Rfl: 1  •  atorvastatin (LIPITOR) 20 MG tablet, TAKE ONE TABLET BY MOUTH DAILY (Patient taking differently: Take 20 mg by mouth Every Night.), Disp: 90 tablet, Rfl: 0  •  diclofenac (VOLTAREN) 75 MG EC tablet, TAKE ONE TABLET BY MOUTH TWICE A DAY WITH FOOD (Patient taking differently: Take 75 mg by mouth 2 (Two) Times a Day.), Disp: 60 tablet, Rfl: 3  •  fluticasone (FLONASE) 50 MCG/ACT nasal spray, SPRAY ONE SPRAY IN EACH NOSTRIL ONCE DAILY (Patient taking differently: 2 sprays into the nostril(s) as directed by provider Daily As Needed.), Disp: 16 mL, Rfl: 5  •  gabapentin (NEURONTIN) 100 MG capsule, Take 100 mg by mouth At Night As Needed., Disp: , Rfl:   •  gemfibrozil (LOPID) 600 MG tablet, Take 600 mg by mouth Daily., Disp: , Rfl:   •  glimepiride (AMARYL) 1 MG tablet, Take 1 tablet by mouth Every Morning Before Breakfast., Disp: 90 tablet, Rfl: 1  •  HYDROcodone-acetaminophen (NORCO) 5-325 MG per tablet, Take 1-2 tablets by mouth Every 4 (Four) Hours As Needed for Moderate Pain ., Disp: 20 tablet, Rfl: 0  •  loratadine (CLARITIN) 10 MG tablet, Take 10 mg by mouth Daily., Disp: , Rfl:   •  metFORMIN (GLUCOPHAGE) 1000 MG tablet, TAKE ONE TABLET BY MOUTH TWICE A DAY (Patient taking differently: Take 1,000 mg by mouth 2 (Two) Times a Day With Meals.), Disp: 180 tablet, Rfl: 1  •  omeprazole (priLOSEC) 20 MG capsule, TAKE ONE CAPSULE BY MOUTH DAILY (Patient taking differently: Take 20 mg by mouth Daily As Needed.), Disp: 90 capsule, Rfl: 1  •  solifenacin (VESICARE) 5 MG tablet, TAKE ONE TABLET BY MOUTH DAILY (Patient taking differently: Take 5 mg by mouth Daily.), Disp: 90 tablet, Rfl: 3  •  traMADol (ULTRAM) 50 MG  "tablet, TAKE ONE TABLET BY MOUTH THREE TIMES A DAY AS NEEDED (Patient taking differently: Take 50 mg by mouth Every 8 (Eight) Hours As Needed.), Disp: 90 tablet, Rfl: 1  •  Vitamin D, Cholecalciferol, 50 MCG (2000 UT) capsule, Take 2,000 Units by mouth Daily., Disp: , Rfl:      Allergies   Allergen Reactions   • Penicillins Rash       Family History   Problem Relation Age of Onset   • Alcohol abuse Father    • COPD Father    • Hypertension Father    • Arthritis Mother    • Diabetes Mother    • Asthma Daughter         Social History     Social History Narrative   • Not on file       Objective     Vital Signs:   Resp 16   Ht 172.7 cm (68\")   Wt (!) 173 kg (381 lb)   BMI 57.93 kg/m²       Physical Exam    Nasal endoscopy with left nasal debridement    Date/Time: 10/27/2021 10:24 AM  Performed by: Alberto Tavera MD  Authorized by: Alberto Tavera MD     Procedure details:     Indications: acute airway obstruction, sino-nasal symptoms and staging of a known neoplasm      Medication:  None    The nose was examined and debrided using a forceps      Debridement location:  Left nasal debridement  Comments:      The nasal cavity was assessed with a flexible laryngoscope bilaterally and revealed crusting along the left inferior turbinate surgical site.  This appears to be healing well.  Bayonet forceps were used to remove the crusting revealing well-healing mucous membranes.        Constitutional   Appearance  · : well developed, well-nourished, alert and in no acute distress, voice clear and strong    Head  Inspection  · : no deformities or lesions  Face  Inspection  · : No facial lesions; House-Brackmann I/VI bilaterally  Palpation  · : No TMJ crepitus nor  muscle tenderness bilaterally    Eyes  Vision  Visual Fields  · : Extraocular movements are intact. No spontaneous or gaze-induced nystagmus.  Conjunctivae  · : clear  Sclerae  · : clear  Pupils and Irises  · : pupils equal, round, and reactive to " light.     Ears, Nose, Mouth and Throat    Ears    External Ears  · : appearance within normal limits, no lesions present  Otoscopic Examination  · : Tympanic membrane appearance within normal limits bilaterally without perforations, well-aerated middle ears  Hearing  · : intact to conversational voice both ears  Tunning fork testing:     :    Nose    External Nose  · : appearance normal  Intranasal Exam  · : mucosa within normal limits, vestibules normal, no intranasal lesions present, septum midline, sinuses non tender to percussion  Oral Cavity    Oral Mucosa  · : oral mucosa normal without pallor or cyanosis  Lips  · : lip appearance normal  Teeth  · : normal dentition for age  Gums  · : gums pink, non-swollen, no bleeding present  Tongue  · : tongue appearance normal; normal mobility  Palate  · : hard palate normal, soft palate appearance normal with symmetric mobility    Throat    Oropharynx  · : no inflammation or lesions present, tonsils within normal limits  Hypopharynx  · : appearance within normal limits, superior epiglottis within normal limits  Larynx  · : appearance within normal limits, vocal cords within normal limits, no lesions present    Neck  Inspection/Palpation  · : normal appearance, no masses or tenderness, trachea midline; thyroid size normal, nontender, no nodules or masses present on palpation    Respiratory  Respiratory Effort  · : breathing unlabored  Inspection of Chest  · : normal appearance, no retractions    Cardiovascular  Heart  · : regular rate and rhythm    Lymphatic  Neck  · : no lymphadenopathy present  Supraclavicular Nodes  · : no lymphadenopathy present  Preauricular Nodes  · : no lymphadenopathy present    Skin and Subcutaneous Tissue  General Inspection  · : Regarding face and neck - there are no rashes present, no lesions present, and no areas of discoloration    Neurologic  Cranial Nerves  · : cranial nerves II-XII are grossly intact bilaterally  Gait and Station  · :  normal gait, able to stand without diffculty    Psychiatric  Judgement and Insight  · : judgment and insight intact  Mood and Affect  · : mood normal, affect appropriate          Assessment and Plan    Diagnoses and all orders for this visit:    1. Nasal cavity mass (Primary)    2. Nasal obstruction    3. Chronic pansinusitis    Examination today included nasal endoscopy and debridement.  Crusting was removed, revealing a well-healing surgical site along the inferior turbinate on the left.  Nasal cavities widely patent and I see no evidence of tumor.  I have asked her to do nasal saline irrigations and steaming, as well as nasal saline gel 3 times per day.  I will plan on seeing her back in the clinic in 2 to 3 months to reassess the nose.    Follow Up   No follow-ups on file.  Patient was given instructions and counseling regarding her condition or for health maintenance advice. Please see specific information pulled into the AVS if appropriate.

## 2021-10-28 RX ORDER — GABAPENTIN 400 MG/1
CAPSULE ORAL
Qty: 30 CAPSULE | Refills: 2 | Status: SHIPPED | OUTPATIENT
Start: 2021-10-28 | End: 2022-01-19

## 2021-11-03 RX ORDER — ATENOLOL AND CHLORTHALIDONE TABLET 100; 25 MG/1; MG/1
1 TABLET ORAL DAILY
Qty: 90 TABLET | Refills: 1 | Status: SHIPPED | OUTPATIENT
Start: 2021-11-03 | End: 2022-04-07

## 2021-11-03 RX ORDER — DICLOFENAC SODIUM 75 MG/1
TABLET, DELAYED RELEASE ORAL
Qty: 60 TABLET | Refills: 3 | Status: SHIPPED | OUTPATIENT
Start: 2021-11-03 | End: 2022-03-09

## 2021-11-19 ENCOUNTER — OFFICE VISIT (OUTPATIENT)
Dept: FAMILY MEDICINE CLINIC | Age: 61
End: 2021-11-19

## 2021-11-19 ENCOUNTER — LAB (OUTPATIENT)
Dept: LAB | Facility: HOSPITAL | Age: 61
End: 2021-11-19

## 2021-11-19 VITALS
SYSTOLIC BLOOD PRESSURE: 112 MMHG | DIASTOLIC BLOOD PRESSURE: 46 MMHG | BODY MASS INDEX: 44.41 KG/M2 | HEIGHT: 68 IN | TEMPERATURE: 97.9 F | WEIGHT: 293 LBS | HEART RATE: 72 BPM

## 2021-11-19 DIAGNOSIS — I10 ESSENTIAL HYPERTENSION: ICD-10-CM

## 2021-11-19 DIAGNOSIS — K21.9 GASTROESOPHAGEAL REFLUX DISEASE WITHOUT ESOPHAGITIS: ICD-10-CM

## 2021-11-19 DIAGNOSIS — E11.42 TYPE 2 DIABETES MELLITUS WITH DIABETIC POLYNEUROPATHY, WITHOUT LONG-TERM CURRENT USE OF INSULIN (HCC): ICD-10-CM

## 2021-11-19 DIAGNOSIS — Z79.899 HIGH RISK MEDICATION USE: ICD-10-CM

## 2021-11-19 DIAGNOSIS — M15.9 GENERALIZED OSTEOARTHRITIS: Primary | ICD-10-CM

## 2021-11-19 DIAGNOSIS — E78.2 MIXED HYPERLIPIDEMIA: ICD-10-CM

## 2021-11-19 LAB
ALBUMIN SERPL-MCNC: 4.3 G/DL (ref 3.5–5.2)
ALBUMIN UR-MCNC: <1.2 MG/DL
ALBUMIN/GLOB SERPL: 1.2 G/DL
ALP SERPL-CCNC: 100 U/L (ref 39–117)
ALT SERPL W P-5'-P-CCNC: 26 U/L (ref 1–33)
AMPHET+METHAMPHET UR QL: NEGATIVE
AMPHETAMINES UR QL: NEGATIVE
ANION GAP SERPL CALCULATED.3IONS-SCNC: 12.4 MMOL/L (ref 5–15)
AST SERPL-CCNC: 25 U/L (ref 1–32)
BARBITURATES UR QL SCN: NEGATIVE
BENZODIAZ UR QL SCN: NEGATIVE
BILIRUB SERPL-MCNC: 0.2 MG/DL (ref 0–1.2)
BUN SERPL-MCNC: 23 MG/DL (ref 8–23)
BUN/CREAT SERPL: 21.5 (ref 7–25)
BUPRENORPHINE SERPL-MCNC: NEGATIVE NG/ML
CALCIUM SPEC-SCNC: 9.7 MG/DL (ref 8.6–10.5)
CANNABINOIDS SERPL QL: NEGATIVE
CHLORIDE SERPL-SCNC: 97 MMOL/L (ref 98–107)
CHOLEST SERPL-MCNC: 176 MG/DL (ref 0–200)
CO2 SERPL-SCNC: 26.6 MMOL/L (ref 22–29)
COCAINE UR QL: NEGATIVE
CREAT SERPL-MCNC: 1.07 MG/DL (ref 0.57–1)
CREAT UR-MCNC: 107 MG/DL
EXPIRATION DATE: NORMAL
GFR SERPL CREATININE-BSD FRML MDRD: 52 ML/MIN/1.73
GLOBULIN UR ELPH-MCNC: 3.7 GM/DL
GLUCOSE SERPL-MCNC: 125 MG/DL (ref 65–99)
HBA1C MFR BLD: 6.63 % (ref 4.8–5.6)
HDLC SERPL-MCNC: 46 MG/DL (ref 40–60)
LDLC SERPL CALC-MCNC: 86 MG/DL (ref 0–100)
LDLC/HDLC SERPL: 1.68 {RATIO}
Lab: NORMAL
MDMA UR QL SCN: NEGATIVE
METHADONE UR QL SCN: NEGATIVE
MICROALBUMIN/CREAT UR: NORMAL MG/G{CREAT}
OPIATES UR QL: NEGATIVE
OXYCODONE UR QL SCN: NEGATIVE
PCP UR QL SCN: NEGATIVE
POTASSIUM SERPL-SCNC: 4.2 MMOL/L (ref 3.5–5.2)
PROT SERPL-MCNC: 8 G/DL (ref 6–8.5)
SODIUM SERPL-SCNC: 136 MMOL/L (ref 136–145)
TRIGL SERPL-MCNC: 264 MG/DL (ref 0–150)
VLDLC SERPL-MCNC: 44 MG/DL (ref 5–40)

## 2021-11-19 PROCEDURE — 82043 UR ALBUMIN QUANTITATIVE: CPT | Performed by: FAMILY MEDICINE

## 2021-11-19 PROCEDURE — 80061 LIPID PANEL: CPT

## 2021-11-19 PROCEDURE — 83036 HEMOGLOBIN GLYCOSYLATED A1C: CPT

## 2021-11-19 PROCEDURE — 36415 COLL VENOUS BLD VENIPUNCTURE: CPT

## 2021-11-19 PROCEDURE — 82570 ASSAY OF URINE CREATININE: CPT | Performed by: FAMILY MEDICINE

## 2021-11-19 PROCEDURE — 80053 COMPREHEN METABOLIC PANEL: CPT

## 2021-11-19 PROCEDURE — 99214 OFFICE O/P EST MOD 30 MIN: CPT | Performed by: FAMILY MEDICINE

## 2021-11-19 PROCEDURE — 80305 DRUG TEST PRSMV DIR OPT OBS: CPT | Performed by: FAMILY MEDICINE

## 2021-11-19 NOTE — ASSESSMENT & PLAN NOTE
She is on glimepiride and metformin for DM.  No refills needed.  She is UTD on eye exam (3/2021), done at [place].  She is due for foot exam (9/2020); exam today shows almost complete loss of sensation.  We discussed the need for daily foot checks. She is on a statin.   She is on gabapentin for diabetic peripheral neuropathy.  Pain is fairly well controlled.  No adverse effects.  Controlled substance monitoring as above.  Checking labs.

## 2021-11-19 NOTE — PROGRESS NOTES
"Chief Complaint  Osteoarthritis (follow up )    Subjective          Sarahi Palafox presents to [facility] today for routine f/u of chronic issues.    She had her nasal mass removed by Dr. Tavera back lat month.  \"It is wonderful.\"  She can finally breathe through her nose again!  She has recovered very well from surgery.    She is on tramadol and diclofenac for these osteoarthritis.  She has the worst pain in the shoulders and knees.  She uses the tramadol 3 tabs per day.  She does use a cane to ambulate.  Following with Dr. Cisneros but he does not think she is a surgical candidate at present.  They have tried Synvisc for her but she did not get any significant relief.  She denies adverse effects with tramadol and diclofenac.    She is on glimepiride and metformin for DM.   She is UTD on eye exam (3/2021), done at [place].  She is due for foot exam (9/2020). She is on a statin.   She is on gabapentin for diabetic peripheral neuropathy.  Pain is fairly well controlled.  No adverse effects.    She is on atenolol/chlorthalidone for HTN.  Her blood pressure has been well controlled.  She denies chest pain, SOB, palpitations.    She is on atorvastatin for cholesterol.  She denies myalgias.     She is on Vesicare for urge incontinence.  Urinary sx well controlled.    She is on Prilosec for GERD.  She denies heartburn or indigestion.    She uses her CPAP religiously every night.      Current Outpatient Medications:   •  atenolol-chlorthalidone (TENORETIC) 100-25 MG per tablet, Take 1 tablet by mouth Daily., Disp: 90 tablet, Rfl: 1  •  atorvastatin (LIPITOR) 20 MG tablet, TAKE ONE TABLET BY MOUTH DAILY (Patient taking differently: Take 20 mg by mouth Every Night.), Disp: 90 tablet, Rfl: 0  •  diclofenac (VOLTAREN) 75 MG EC tablet, TAKE ONE TABLET BY MOUTH TWICE A DAY WITH FOOD, Disp: 60 tablet, Rfl: 3  •  fluticasone (FLONASE) 50 MCG/ACT nasal spray, SPRAY ONE SPRAY IN EACH NOSTRIL ONCE DAILY " "(Patient taking differently: 2 sprays into the nostril(s) as directed by provider Daily As Needed.), Disp: 16 mL, Rfl: 5  •  gabapentin (NEURONTIN) 400 MG capsule, TAKE ONE CAPSULE BY MOUTH EVERY NIGHT AT BEDTIME, Disp: 30 capsule, Rfl: 2  •  gemfibrozil (LOPID) 600 MG tablet, Take 600 mg by mouth Daily., Disp: , Rfl:   •  glimepiride (AMARYL) 1 MG tablet, Take 1 tablet by mouth Every Morning Before Breakfast., Disp: 90 tablet, Rfl: 1  •  loratadine (CLARITIN) 10 MG tablet, Take 10 mg by mouth Daily., Disp: , Rfl:   •  metFORMIN (GLUCOPHAGE) 1000 MG tablet, TAKE ONE TABLET BY MOUTH TWICE A DAY (Patient taking differently: Take 1,000 mg by mouth 2 (Two) Times a Day With Meals.), Disp: 180 tablet, Rfl: 1  •  omeprazole (priLOSEC) 20 MG capsule, TAKE ONE CAPSULE BY MOUTH DAILY (Patient taking differently: Take 20 mg by mouth Daily As Needed.), Disp: 90 capsule, Rfl: 1  •  solifenacin (VESICARE) 5 MG tablet, TAKE ONE TABLET BY MOUTH DAILY (Patient taking differently: Take 5 mg by mouth Daily.), Disp: 90 tablet, Rfl: 3  •  traMADol (ULTRAM) 50 MG tablet, TAKE ONE TABLET BY MOUTH THREE TIMES A DAY AS NEEDED (Patient taking differently: Take 50 mg by mouth Every 8 (Eight) Hours As Needed.), Disp: 90 tablet, Rfl: 1  •  Vitamin D, Cholecalciferol, 50 MCG (2000 UT) capsule, Take 2,000 Units by mouth Daily., Disp: , Rfl:     Allergies:  Penicillins      Objective   Vital Signs:   /46 (BP Location: Right arm, Patient Position: Sitting)   Pulse 72   Temp 97.9 °F (36.6 °C) (Oral)   Ht 172.7 cm (68\")   Wt (!) 173 kg (381 lb)   BMI 57.93 kg/m²     Physical Exam  Vitals reviewed.   Constitutional:       General: She is not in acute distress.     Appearance: Normal appearance. She is well-developed.   HENT:      Head: Normocephalic and atraumatic.      Right Ear: External ear normal.      Left Ear: External ear normal.      Nose: Nose normal.      Mouth/Throat:      Mouth: Mucous membranes are moist.   Eyes:      " Extraocular Movements: Extraocular movements intact.      Conjunctiva/sclera: Conjunctivae normal.      Pupils: Pupils are equal, round, and reactive to light.   Cardiovascular:      Rate and Rhythm: Normal rate and regular rhythm.      Pulses:           Dorsalis pedis pulses are 1+ on the right side and 1+ on the left side.      Heart sounds: No murmur heard.      Pulmonary:      Effort: Pulmonary effort is normal.      Breath sounds: Normal breath sounds. No wheezing, rhonchi or rales.   Abdominal:      General: Bowel sounds are normal. There is no distension.      Palpations: Abdomen is soft.      Tenderness: There is no abdominal tenderness.   Musculoskeletal:         General: Normal range of motion.   Feet:      Right foot:      Protective Sensation: 3 sites tested. 0 sites sensed.      Skin integrity: Skin integrity normal. No ulcer or blister.      Toenail Condition: Right toenails are normal.      Left foot:      Protective Sensation: 3 sites tested. 0 sites sensed.      Skin integrity: Skin integrity normal. No ulcer or blister.      Toenail Condition: Left toenails are normal.      Comments: Diabetic Foot Exam Performed     Neurological:      Mental Status: She is alert.   Psychiatric:         Mood and Affect: Mood and affect normal.         Behavior: Behavior normal.         Thought Content: Thought content normal.         Judgment: Judgment normal.             Assessment and Plan    Diagnoses and all orders for this visit:    1. Generalized osteoarthritis (Primary)  Assessment & Plan:  Stable on current regimen.  Symptoms are well controlled.  No adverse effects. She does require ongoing use of this controlled substance to function.  Tox screen was due today.  Prior tox screen appropriate.  Gregg was run today.  Refills were not needed today.  RTC 3 months.        2. High risk medication use  -     POC Urine Drug Screen [facility]    3. Type 2 diabetes mellitus with diabetic polyneuropathy, without  long-term current use of insulin (HCC)  Assessment & Plan:  She is on glimepiride and metformin for DM.  No refills needed.  She is UTD on eye exam (3/2021), done at [place].  She is due for foot exam (9/2020); exam today shows almost complete loss of sensation.  We discussed the need for daily foot checks. She is on a statin.   She is on gabapentin for diabetic peripheral neuropathy.  Pain is fairly well controlled.  No adverse effects.  Controlled substance monitoring as above.  Checking labs.    Orders:  -     Comprehensive Metabolic Panel; Future  -     Lipid Panel; Future  -     Hemoglobin A1c; Future  -     Cancel: Microalbumin / Creatinine Urine Ratio - Urine, Clean Catch; Future  -     Microalbumin / Creatinine Urine Ratio - Urine, Clean Catch; Future  -     Microalbumin / Creatinine Urine Ratio - Urine, Clean Catch    4. Essential hypertension  Assessment & Plan:  Blood pressure goal.  No refills needed.  Checking labs.      5. Mixed hyperlipidemia  Assessment & Plan:  Stable.  No refills needed.  Checking labs.      6. Gastroesophageal reflux disease without esophagitis  Assessment & Plan:  Stable on omeprazole.  She is taking this just as needed.  Okay to continue.  No refills needed.        Follow Up   Return in about 3 months (around 2/19/2022) for Medicare Wellness.  Patient was given instructions and counseling regarding her condition or for health maintenance advice. Please see specific information pulled into the AVS if appropriate.     Answers for HPI/ROS submitted by the patient on 8/6/2021  Please describe your symptoms.: Follow-up appointment  Have you had these symptoms before?: No  How long have you been having these symptoms?: Greater than 2 weeks  Please list any medications you are currently taking for this condition.: 0  Please describe any probable cause for these symptoms. : 0  What is the primary reason for your visit?: Other

## 2021-12-07 DIAGNOSIS — M15.9 GENERALIZED OSTEOARTHRITIS: ICD-10-CM

## 2021-12-07 RX ORDER — TRAMADOL HYDROCHLORIDE 50 MG/1
TABLET ORAL
Qty: 90 TABLET | Refills: 2 | Status: SHIPPED | OUTPATIENT
Start: 2021-12-07 | End: 2022-04-06

## 2022-01-04 RX ORDER — ATORVASTATIN CALCIUM 20 MG/1
TABLET, FILM COATED ORAL
Qty: 90 TABLET | Refills: 1 | Status: SHIPPED | OUTPATIENT
Start: 2022-01-04 | End: 2022-06-08

## 2022-01-17 RX ORDER — GLIMEPIRIDE 1 MG/1
TABLET ORAL
Qty: 90 TABLET | Refills: 1 | Status: SHIPPED | OUTPATIENT
Start: 2022-01-17 | End: 2022-06-29

## 2022-01-19 DIAGNOSIS — E11.42 DIABETIC POLYNEUROPATHY ASSOCIATED WITH TYPE 2 DIABETES MELLITUS: ICD-10-CM

## 2022-01-19 RX ORDER — GABAPENTIN 400 MG/1
CAPSULE ORAL
Qty: 30 CAPSULE | Refills: 2 | Status: SHIPPED | OUTPATIENT
Start: 2022-01-19 | End: 2022-04-20

## 2022-01-19 RX ORDER — OMEPRAZOLE 20 MG/1
20 CAPSULE, DELAYED RELEASE ORAL DAILY
Qty: 90 CAPSULE | Refills: 0 | Status: SHIPPED | OUTPATIENT
Start: 2022-01-19 | End: 2022-04-07

## 2022-02-03 RX ORDER — GLIMEPIRIDE 1 MG/1
TABLET ORAL
Qty: 30 TABLET | OUTPATIENT
Start: 2022-02-03

## 2022-02-25 ENCOUNTER — OFFICE VISIT (OUTPATIENT)
Dept: FAMILY MEDICINE CLINIC | Age: 62
End: 2022-02-25

## 2022-02-25 VITALS
WEIGHT: 293 LBS | BODY MASS INDEX: 44.41 KG/M2 | DIASTOLIC BLOOD PRESSURE: 43 MMHG | SYSTOLIC BLOOD PRESSURE: 102 MMHG | HEIGHT: 68 IN | TEMPERATURE: 98.4 F | HEART RATE: 62 BPM

## 2022-02-25 DIAGNOSIS — Z79.899 HIGH RISK MEDICATION USE: ICD-10-CM

## 2022-02-25 DIAGNOSIS — I10 ESSENTIAL HYPERTENSION: ICD-10-CM

## 2022-02-25 DIAGNOSIS — E11.42 TYPE 2 DIABETES MELLITUS WITH DIABETIC POLYNEUROPATHY, WITHOUT LONG-TERM CURRENT USE OF INSULIN: ICD-10-CM

## 2022-02-25 DIAGNOSIS — M15.9 GENERALIZED OSTEOARTHRITIS: Primary | ICD-10-CM

## 2022-02-25 DIAGNOSIS — K21.9 GASTROESOPHAGEAL REFLUX DISEASE WITHOUT ESOPHAGITIS: ICD-10-CM

## 2022-02-25 DIAGNOSIS — E78.2 MIXED HYPERLIPIDEMIA: ICD-10-CM

## 2022-02-25 DIAGNOSIS — E66.01 OBESITY, MORBID: ICD-10-CM

## 2022-02-25 LAB
AMPHET+METHAMPHET UR QL: NEGATIVE
AMPHETAMINES UR QL: NEGATIVE
BARBITURATES UR QL SCN: NEGATIVE
BENZODIAZ UR QL SCN: NEGATIVE
BUPRENORPHINE SERPL-MCNC: NEGATIVE NG/ML
CANNABINOIDS SERPL QL: NEGATIVE
COCAINE UR QL: NEGATIVE
EXPIRATION DATE: NORMAL
Lab: NORMAL
MDMA UR QL SCN: NEGATIVE
METHADONE UR QL SCN: NEGATIVE
OPIATES UR QL: NEGATIVE
OXYCODONE UR QL SCN: NEGATIVE
PCP UR QL SCN: NEGATIVE

## 2022-02-25 PROCEDURE — 99214 OFFICE O/P EST MOD 30 MIN: CPT | Performed by: FAMILY MEDICINE

## 2022-02-25 PROCEDURE — 80305 DRUG TEST PRSMV DIR OPT OBS: CPT | Performed by: FAMILY MEDICINE

## 2022-03-09 RX ORDER — DICLOFENAC SODIUM 75 MG/1
TABLET, DELAYED RELEASE ORAL
Qty: 60 TABLET | Refills: 3 | Status: SHIPPED | OUTPATIENT
Start: 2022-03-09 | End: 2022-07-20

## 2022-03-28 ENCOUNTER — TELEPHONE (OUTPATIENT)
Dept: FAMILY MEDICINE CLINIC | Age: 62
End: 2022-03-28

## 2022-03-28 DIAGNOSIS — Z12.31 ENCOUNTER FOR SCREENING MAMMOGRAM FOR BREAST CANCER: Primary | ICD-10-CM

## 2022-04-04 ENCOUNTER — HOSPITAL ENCOUNTER (OUTPATIENT)
Dept: MAMMOGRAPHY | Facility: HOSPITAL | Age: 62
Discharge: HOME | End: 2022-04-04
Admitting: FAMILY MEDICINE

## 2022-04-04 DIAGNOSIS — Z12.31 ENCOUNTER FOR SCREENING MAMMOGRAM FOR BREAST CANCER: ICD-10-CM

## 2022-04-04 PROCEDURE — 77063 BREAST TOMOSYNTHESIS BI: CPT

## 2022-04-04 PROCEDURE — 77067 SCR MAMMO BI INCL CAD: CPT

## 2022-04-06 DIAGNOSIS — L89.311 PRESSURE INJURY OF RIGHT BUTTOCK, STAGE 1: ICD-10-CM

## 2022-04-06 DIAGNOSIS — M15.9 GENERALIZED OSTEOARTHRITIS: ICD-10-CM

## 2022-04-07 RX ORDER — OMEPRAZOLE 20 MG/1
CAPSULE, DELAYED RELEASE ORAL
Qty: 90 CAPSULE | Refills: 0 | Status: SHIPPED | OUTPATIENT
Start: 2022-04-07 | End: 2022-07-05 | Stop reason: SDUPTHER

## 2022-04-07 RX ORDER — TRAMADOL HYDROCHLORIDE 50 MG/1
TABLET ORAL
Qty: 90 TABLET | Refills: 0 | Status: SHIPPED | OUTPATIENT
Start: 2022-04-07 | End: 2022-05-17 | Stop reason: SDUPTHER

## 2022-04-07 RX ORDER — ATENOLOL AND CHLORTHALIDONE TABLET 100; 25 MG/1; MG/1
TABLET ORAL
Qty: 90 TABLET | Refills: 1 | Status: SHIPPED | OUTPATIENT
Start: 2022-04-07 | End: 2022-10-07 | Stop reason: SDUPTHER

## 2022-04-12 RX ORDER — SILVER SULFADIAZINE 1 %
CREAM (GRAM) TOPICAL
Qty: 75 G | Refills: 0 | Status: SHIPPED | OUTPATIENT
Start: 2022-04-12 | End: 2023-02-27 | Stop reason: SDUPTHER

## 2022-04-20 DIAGNOSIS — E11.42 DIABETIC POLYNEUROPATHY ASSOCIATED WITH TYPE 2 DIABETES MELLITUS: ICD-10-CM

## 2022-04-20 RX ORDER — GABAPENTIN 400 MG/1
CAPSULE ORAL
Qty: 30 CAPSULE | Refills: 2 | Status: SHIPPED | OUTPATIENT
Start: 2022-04-20 | End: 2022-07-20

## 2022-05-17 DIAGNOSIS — M15.9 GENERALIZED OSTEOARTHRITIS: ICD-10-CM

## 2022-05-17 RX ORDER — TRAMADOL HYDROCHLORIDE 50 MG/1
50 TABLET ORAL 3 TIMES DAILY PRN
Qty: 90 TABLET | Refills: 2 | Status: SHIPPED | OUTPATIENT
Start: 2022-05-17 | End: 2022-10-07

## 2022-05-24 RX ORDER — SOLIFENACIN SUCCINATE 5 MG/1
TABLET, FILM COATED ORAL
Qty: 90 TABLET | Refills: 1 | Status: SHIPPED | OUTPATIENT
Start: 2022-05-24 | End: 2022-11-10

## 2022-06-08 RX ORDER — ATORVASTATIN CALCIUM 20 MG/1
TABLET, FILM COATED ORAL
Qty: 90 TABLET | Refills: 1 | Status: SHIPPED | OUTPATIENT
Start: 2022-06-08 | End: 2022-12-06

## 2022-06-14 ENCOUNTER — TELEPHONE (OUTPATIENT)
Dept: FAMILY MEDICINE CLINIC | Age: 62
End: 2022-06-14

## 2022-06-29 RX ORDER — GLIMEPIRIDE 1 MG/1
TABLET ORAL
Qty: 90 TABLET | Refills: 1 | Status: SHIPPED | OUTPATIENT
Start: 2022-06-29 | End: 2022-12-01

## 2022-07-05 RX ORDER — OMEPRAZOLE 20 MG/1
20 CAPSULE, DELAYED RELEASE ORAL DAILY
Qty: 90 CAPSULE | Refills: 1 | Status: SHIPPED | OUTPATIENT
Start: 2022-07-05 | End: 2022-12-01

## 2022-07-20 DIAGNOSIS — E11.42 DIABETIC POLYNEUROPATHY ASSOCIATED WITH TYPE 2 DIABETES MELLITUS: ICD-10-CM

## 2022-07-20 RX ORDER — DICLOFENAC SODIUM 75 MG/1
TABLET, DELAYED RELEASE ORAL
Qty: 60 TABLET | Refills: 5 | Status: SHIPPED | OUTPATIENT
Start: 2022-07-20 | End: 2022-12-07 | Stop reason: SDUPTHER

## 2022-07-20 RX ORDER — GABAPENTIN 400 MG/1
CAPSULE ORAL
Qty: 30 CAPSULE | Refills: 2 | Status: SHIPPED | OUTPATIENT
Start: 2022-07-20 | End: 2022-10-18

## 2022-10-06 DIAGNOSIS — M15.9 GENERALIZED OSTEOARTHRITIS: ICD-10-CM

## 2022-10-07 RX ORDER — ATENOLOL AND CHLORTHALIDONE TABLET 100; 25 MG/1; MG/1
1 TABLET ORAL DAILY
Qty: 90 TABLET | Refills: 1 | Status: SHIPPED | OUTPATIENT
Start: 2022-10-07 | End: 2022-12-07 | Stop reason: SDUPTHER

## 2022-10-07 RX ORDER — TRAMADOL HYDROCHLORIDE 50 MG/1
TABLET ORAL
Qty: 30 TABLET | Refills: 0 | Status: SHIPPED | OUTPATIENT
Start: 2022-10-07 | End: 2022-11-11 | Stop reason: SDUPTHER

## 2022-10-17 DIAGNOSIS — E11.42 DIABETIC POLYNEUROPATHY ASSOCIATED WITH TYPE 2 DIABETES MELLITUS: ICD-10-CM

## 2022-10-18 RX ORDER — GABAPENTIN 400 MG/1
CAPSULE ORAL
Qty: 30 CAPSULE | Refills: 2 | Status: SHIPPED | OUTPATIENT
Start: 2022-10-18 | End: 2022-12-19 | Stop reason: SDUPTHER

## 2022-11-10 RX ORDER — SOLIFENACIN SUCCINATE 5 MG/1
TABLET, FILM COATED ORAL
Qty: 90 TABLET | Refills: 1 | Status: SHIPPED | OUTPATIENT
Start: 2022-11-10 | End: 2022-12-07 | Stop reason: SDUPTHER

## 2022-11-11 DIAGNOSIS — M15.9 GENERALIZED OSTEOARTHRITIS: ICD-10-CM

## 2022-11-11 RX ORDER — TRAMADOL HYDROCHLORIDE 50 MG/1
50 TABLET ORAL 3 TIMES DAILY PRN
Qty: 30 TABLET | Refills: 2 | Status: SHIPPED | OUTPATIENT
Start: 2022-11-11 | End: 2023-01-30 | Stop reason: SDUPTHER

## 2022-11-11 NOTE — TELEPHONE ENCOUNTER
Rx Refill Note  Requested Prescriptions     Pending Prescriptions Disp Refills   • traMADol (ULTRAM) 50 MG tablet 30 tablet 0     Sig: Take 1 tablet by mouth 3 (Three) Times a Day As Needed (pain). moderate pain      Last office visit with prescribing clinician: 2/25/2022      Next office visit with prescribing clinician: 1/20/2023    Last filled 10/7/22 #30 no refill by Dr Miner     Last UDS  2/25/22      Last contract none seen in chart         Yenifer Ramirez LPN  11/11/22, 13:07 EST

## 2022-12-01 RX ORDER — GLIMEPIRIDE 1 MG/1
TABLET ORAL
Qty: 90 TABLET | Refills: 1 | Status: SHIPPED | OUTPATIENT
Start: 2022-12-01 | End: 2022-12-07 | Stop reason: SDUPTHER

## 2022-12-01 RX ORDER — FLUTICASONE PROPIONATE 50 MCG
SPRAY, SUSPENSION (ML) NASAL
Qty: 16 ML | Refills: 5 | Status: SHIPPED | OUTPATIENT
Start: 2022-12-01 | End: 2023-03-28 | Stop reason: SDUPTHER

## 2022-12-01 RX ORDER — OMEPRAZOLE 20 MG/1
CAPSULE, DELAYED RELEASE ORAL
Qty: 90 CAPSULE | Refills: 1 | Status: SHIPPED | OUTPATIENT
Start: 2022-12-01 | End: 2022-12-07 | Stop reason: SDUPTHER

## 2022-12-06 RX ORDER — ATORVASTATIN CALCIUM 20 MG/1
TABLET, FILM COATED ORAL
Qty: 90 TABLET | Refills: 1 | Status: SHIPPED | OUTPATIENT
Start: 2022-12-06 | End: 2022-12-07 | Stop reason: SDUPTHER

## 2022-12-07 DIAGNOSIS — E11.42 DIABETIC POLYNEUROPATHY ASSOCIATED WITH TYPE 2 DIABETES MELLITUS: ICD-10-CM

## 2022-12-07 RX ORDER — ATORVASTATIN CALCIUM 20 MG/1
20 TABLET, FILM COATED ORAL DAILY
Qty: 90 TABLET | Refills: 1 | Status: SHIPPED | OUTPATIENT
Start: 2022-12-07

## 2022-12-07 RX ORDER — OMEPRAZOLE 20 MG/1
20 CAPSULE, DELAYED RELEASE ORAL DAILY
Qty: 90 CAPSULE | Refills: 1 | Status: SHIPPED | OUTPATIENT
Start: 2022-12-07

## 2022-12-07 RX ORDER — GLIMEPIRIDE 1 MG/1
1 TABLET ORAL
Qty: 90 TABLET | Refills: 1 | Status: SHIPPED | OUTPATIENT
Start: 2022-12-07

## 2022-12-07 RX ORDER — ATENOLOL AND CHLORTHALIDONE TABLET 100; 25 MG/1; MG/1
1 TABLET ORAL DAILY
Qty: 90 TABLET | Refills: 1 | Status: SHIPPED | OUTPATIENT
Start: 2022-12-07 | End: 2023-03-20 | Stop reason: SDUPTHER

## 2022-12-07 RX ORDER — SOLIFENACIN SUCCINATE 5 MG/1
5 TABLET, FILM COATED ORAL DAILY
Qty: 90 TABLET | Refills: 1 | Status: SHIPPED | OUTPATIENT
Start: 2022-12-07

## 2022-12-07 RX ORDER — DICLOFENAC SODIUM 75 MG/1
75 TABLET, DELAYED RELEASE ORAL 2 TIMES DAILY WITH MEALS
Qty: 180 TABLET | Refills: 1 | Status: SHIPPED | OUTPATIENT
Start: 2022-12-07 | End: 2023-03-20 | Stop reason: SDUPTHER

## 2022-12-19 DIAGNOSIS — E11.42 DIABETIC POLYNEUROPATHY ASSOCIATED WITH TYPE 2 DIABETES MELLITUS: ICD-10-CM

## 2022-12-19 RX ORDER — GABAPENTIN 400 MG/1
400 CAPSULE ORAL
Qty: 90 CAPSULE | Refills: 2 | Status: SHIPPED | OUTPATIENT
Start: 2022-12-19

## 2023-01-20 ENCOUNTER — OFFICE VISIT (OUTPATIENT)
Dept: FAMILY MEDICINE CLINIC | Age: 63
End: 2023-01-20
Payer: MEDICARE

## 2023-01-20 ENCOUNTER — LAB (OUTPATIENT)
Dept: LAB | Facility: HOSPITAL | Age: 63
End: 2023-01-20
Payer: MEDICARE

## 2023-01-20 VITALS
HEIGHT: 68 IN | TEMPERATURE: 98.7 F | OXYGEN SATURATION: 98 % | BODY MASS INDEX: 44.41 KG/M2 | SYSTOLIC BLOOD PRESSURE: 128 MMHG | WEIGHT: 293 LBS | DIASTOLIC BLOOD PRESSURE: 56 MMHG | HEART RATE: 54 BPM

## 2023-01-20 DIAGNOSIS — R20.0 BILATERAL HAND NUMBNESS: ICD-10-CM

## 2023-01-20 DIAGNOSIS — E11.42 TYPE 2 DIABETES MELLITUS WITH DIABETIC POLYNEUROPATHY, WITHOUT LONG-TERM CURRENT USE OF INSULIN: ICD-10-CM

## 2023-01-20 DIAGNOSIS — I10 ESSENTIAL HYPERTENSION: ICD-10-CM

## 2023-01-20 DIAGNOSIS — M15.9 GENERALIZED OSTEOARTHRITIS: Primary | ICD-10-CM

## 2023-01-20 DIAGNOSIS — Z79.899 HIGH RISK MEDICATION USE: ICD-10-CM

## 2023-01-20 DIAGNOSIS — E66.01 OBESITY, MORBID: ICD-10-CM

## 2023-01-20 DIAGNOSIS — G56.03 BILATERAL CARPAL TUNNEL SYNDROME: ICD-10-CM

## 2023-01-20 DIAGNOSIS — E78.2 MIXED HYPERLIPIDEMIA: ICD-10-CM

## 2023-01-20 DIAGNOSIS — G56.20 CUBITAL TUNNEL SYNDROME, UNSPECIFIED LATERALITY: ICD-10-CM

## 2023-01-20 LAB
ALBUMIN SERPL-MCNC: 3.8 G/DL (ref 3.5–5.2)
ALBUMIN/GLOB SERPL: 1 G/DL
ALP SERPL-CCNC: 96 U/L (ref 39–117)
ALT SERPL W P-5'-P-CCNC: 19 U/L (ref 1–33)
AMPHET+METHAMPHET UR QL: NEGATIVE
AMPHETAMINES UR QL: NEGATIVE
ANION GAP SERPL CALCULATED.3IONS-SCNC: 14.3 MMOL/L (ref 5–15)
AST SERPL-CCNC: 20 U/L (ref 1–32)
BARBITURATES UR QL SCN: NEGATIVE
BASOPHILS # BLD AUTO: 0.07 10*3/MM3 (ref 0–0.2)
BASOPHILS NFR BLD AUTO: 0.9 % (ref 0–1.5)
BENZODIAZ UR QL SCN: NEGATIVE
BILIRUB SERPL-MCNC: 0.2 MG/DL (ref 0–1.2)
BUN SERPL-MCNC: 24 MG/DL (ref 8–23)
BUN/CREAT SERPL: 20.9 (ref 7–25)
BUPRENORPHINE SERPL-MCNC: NEGATIVE NG/ML
CALCIUM SPEC-SCNC: 9.5 MG/DL (ref 8.6–10.5)
CANNABINOIDS SERPL QL: NEGATIVE
CHLORIDE SERPL-SCNC: 98 MMOL/L (ref 98–107)
CHOLEST SERPL-MCNC: 173 MG/DL (ref 0–200)
CO2 SERPL-SCNC: 25.7 MMOL/L (ref 22–29)
COCAINE UR QL: NEGATIVE
CREAT SERPL-MCNC: 1.15 MG/DL (ref 0.57–1)
DEPRECATED RDW RBC AUTO: 41.5 FL (ref 37–54)
EGFRCR SERPLBLD CKD-EPI 2021: 54.3 ML/MIN/1.73
EOSINOPHIL # BLD AUTO: 0.14 10*3/MM3 (ref 0–0.4)
EOSINOPHIL NFR BLD AUTO: 1.7 % (ref 0.3–6.2)
ERYTHROCYTE [DISTWIDTH] IN BLOOD BY AUTOMATED COUNT: 14.6 % (ref 12.3–15.4)
EXPIRATION DATE: NORMAL
FOLATE SERPL-MCNC: 5.48 NG/ML (ref 4.78–24.2)
GLOBULIN UR ELPH-MCNC: 3.7 GM/DL
GLUCOSE SERPL-MCNC: 75 MG/DL (ref 65–99)
HBA1C MFR BLD: 5.9 % (ref 4.8–5.6)
HCT VFR BLD AUTO: 33.7 % (ref 34–46.6)
HDLC SERPL-MCNC: 45 MG/DL (ref 40–60)
HGB BLD-MCNC: 10.7 G/DL (ref 12–15.9)
IMM GRANULOCYTES # BLD AUTO: 0.02 10*3/MM3 (ref 0–0.05)
IMM GRANULOCYTES NFR BLD AUTO: 0.2 % (ref 0–0.5)
LDLC SERPL CALC-MCNC: 92 MG/DL (ref 0–100)
LDLC/HDLC SERPL: 1.89 {RATIO}
LYMPHOCYTES # BLD AUTO: 1.18 10*3/MM3 (ref 0.7–3.1)
LYMPHOCYTES NFR BLD AUTO: 14.5 % (ref 19.6–45.3)
Lab: NORMAL
MCH RBC QN AUTO: 25.1 PG (ref 26.6–33)
MCHC RBC AUTO-ENTMCNC: 31.8 G/DL (ref 31.5–35.7)
MCV RBC AUTO: 79.1 FL (ref 79–97)
MDMA UR QL SCN: NEGATIVE
METHADONE UR QL SCN: NEGATIVE
MONOCYTES # BLD AUTO: 0.52 10*3/MM3 (ref 0.1–0.9)
MONOCYTES NFR BLD AUTO: 6.4 % (ref 5–12)
NEUTROPHILS NFR BLD AUTO: 6.2 10*3/MM3 (ref 1.7–7)
NEUTROPHILS NFR BLD AUTO: 76.3 % (ref 42.7–76)
NRBC BLD AUTO-RTO: 0 /100 WBC (ref 0–0.2)
OPIATES UR QL: NEGATIVE
OXYCODONE UR QL SCN: NEGATIVE
PCP UR QL SCN: NEGATIVE
PLATELET # BLD AUTO: 334 10*3/MM3 (ref 140–450)
PMV BLD AUTO: 11.4 FL (ref 6–12)
POTASSIUM SERPL-SCNC: 4.2 MMOL/L (ref 3.5–5.2)
PROT SERPL-MCNC: 7.5 G/DL (ref 6–8.5)
RBC # BLD AUTO: 4.26 10*6/MM3 (ref 3.77–5.28)
SODIUM SERPL-SCNC: 138 MMOL/L (ref 136–145)
TRIGL SERPL-MCNC: 214 MG/DL (ref 0–150)
TSH SERPL DL<=0.05 MIU/L-ACNC: 3.41 UIU/ML (ref 0.27–4.2)
VIT B12 BLD-MCNC: 281 PG/ML (ref 211–946)
VLDLC SERPL-MCNC: 36 MG/DL (ref 5–40)
WBC NRBC COR # BLD: 8.13 10*3/MM3 (ref 3.4–10.8)

## 2023-01-20 PROCEDURE — 82607 VITAMIN B-12: CPT

## 2023-01-20 PROCEDURE — 83036 HEMOGLOBIN GLYCOSYLATED A1C: CPT

## 2023-01-20 PROCEDURE — 84425 ASSAY OF VITAMIN B-1: CPT

## 2023-01-20 PROCEDURE — 80053 COMPREHEN METABOLIC PANEL: CPT

## 2023-01-20 PROCEDURE — 84443 ASSAY THYROID STIM HORMONE: CPT

## 2023-01-20 PROCEDURE — 82746 ASSAY OF FOLIC ACID SERUM: CPT

## 2023-01-20 PROCEDURE — 80305 DRUG TEST PRSMV DIR OPT OBS: CPT | Performed by: FAMILY MEDICINE

## 2023-01-20 PROCEDURE — 99214 OFFICE O/P EST MOD 30 MIN: CPT | Performed by: FAMILY MEDICINE

## 2023-01-20 PROCEDURE — 36415 COLL VENOUS BLD VENIPUNCTURE: CPT

## 2023-01-20 PROCEDURE — 85025 COMPLETE CBC W/AUTO DIFF WBC: CPT

## 2023-01-20 PROCEDURE — 80061 LIPID PANEL: CPT

## 2023-01-20 NOTE — ASSESSMENT & PLAN NOTE
Patient's (Body mass index is 54.81 kg/m².) indicates that they are morbidly/severely obese (BMI > 40 or > 35 with obesity - related health condition) with health conditions that include hypertension and diabetes mellitus . Weight is worsening. BMI is is above average; BMI management plan is completed. We discussed portion control and increasing exercise.

## 2023-01-20 NOTE — ASSESSMENT & PLAN NOTE
Bilateral hand numbness.  Possibly due to diabetic peripheral neuropathy, but I am going to send her for EMG/NCV in case there is some kind of an anatomic problem in which we can intervene such as carpal tunnel or similar.

## 2023-01-20 NOTE — PROGRESS NOTES
Chief Complaint  Diabetes and Med Refill (Pt would like refill on tramadol. )    Subjective          Sarahi Palafox presents to [facility] today for routine follow-up on chronic issues.    She is on tramadol and diclofenac for diffuse osteoarthritis.  Pain is worst in the shoulders and knees.  She uses a cane to ambulate.  Following with Dr. Cisneros.  She does not think she is a good surgical candidate at present.  Synvisc did not provide any significant relief.  Denies adverse effects.    She is having worsened L>R hand numbness.  Decreased  strength.  She does drop things.   No paresthesias or pain.      She is on metformin and glimepiride for diabetes.   She is UTD on eye exam (3/2022), done at [place].  She is due for foot exam (11/2021).  She is on a statin.   She is on gabapentin for diabetic peripheral neuropathy.  Pain is fairly well controlled.  Denies adverse effects.    She is on atenolol/chlorthalidone for HTN.  Her blood pressure has been well controlled.  Denies chest pain, shortness of breath or palpitations.    She is on atorvastatin for hyperlipidemia.  Denies myalgias.     She is on Vesicare for urge incontinence.  Urinary sx well controlled.    She is on omeprazole for GERD.  Denies reflux or heartburn.    She uses her CPAP religiously every night for sleep apnea.      Current Outpatient Medications:   •  atenolol-chlorthalidone (TENORETIC) 100-25 MG per tablet, Take 1 tablet by mouth Daily., Disp: 90 tablet, Rfl: 1  •  atorvastatin (LIPITOR) 20 MG tablet, Take 1 tablet by mouth Daily., Disp: 90 tablet, Rfl: 1  •  diclofenac (VOLTAREN) 75 MG EC tablet, Take 1 tablet by mouth 2 (Two) Times a Day With Meals., Disp: 180 tablet, Rfl: 1  •  fluticasone (FLONASE) 50 MCG/ACT nasal spray, SPRAY ONE SPRAY IN EACH NOSTRIL ONCE DAILY, Disp: 16 mL, Rfl: 5  •  gabapentin (NEURONTIN) 400 MG capsule, Take 1 capsule by mouth every night at bedtime., Disp: 90 capsule, Rfl: 2  •   "gemfibrozil (LOPID) 600 MG tablet, Take 600 mg by mouth Daily., Disp: , Rfl:   •  glimepiride (AMARYL) 1 MG tablet, Take 1 tablet by mouth Every Morning Before Breakfast., Disp: 90 tablet, Rfl: 1  •  loratadine (CLARITIN) 10 MG tablet, Take 10 mg by mouth Daily., Disp: , Rfl:   •  metFORMIN (GLUCOPHAGE) 1000 MG tablet, Take 1 tablet by mouth 2 (Two) Times a Day., Disp: 180 tablet, Rfl: 1  •  omeprazole (priLOSEC) 20 MG capsule, Take 1 capsule by mouth Daily., Disp: 90 capsule, Rfl: 1  •  solifenacin (VESICARE) 5 MG tablet, Take 1 tablet by mouth Daily., Disp: 90 tablet, Rfl: 1  •  SSD 1 % cream, APPLY TO AFFECTED AREA(S) TWO TIMES A DAY AS DIRECTED, Disp: 75 g, Rfl: 0  •  traMADol (ULTRAM) 50 MG tablet, Take 1 tablet by mouth 3 (Three) Times a Day As Needed (pain). moderate pain, Disp: 30 tablet, Rfl: 2  •  Vitamin D, Cholecalciferol, 50 MCG (2000 UT) capsule, Take 2,000 Units by mouth Daily., Disp: , Rfl:     Allergies:  Penicillins      Objective   Vital Signs:   Vitals:    01/20/23 1451   BP: 149/77   BP Location: Right arm   Patient Position: Sitting   Cuff Size: Adult   Pulse: 67   Temp: 98.7 °F (37.1 °C)   TempSrc: Oral   SpO2: 98%  Comment: room air   Weight: (!) 163 kg (360 lb 6.4 oz)   Height: 172.7 cm (67.99\")       Physical Exam  Vitals reviewed.   Constitutional:       General: She is not in acute distress.     Appearance: Normal appearance. She is well-developed.   HENT:      Head: Normocephalic and atraumatic.      Right Ear: External ear normal.      Left Ear: External ear normal.      Nose: Nose normal.      Mouth/Throat:      Mouth: Mucous membranes are moist.   Eyes:      Extraocular Movements: Extraocular movements intact.      Conjunctiva/sclera: Conjunctivae normal.      Pupils: Pupils are equal, round, and reactive to light.   Cardiovascular:      Rate and Rhythm: Normal rate and regular rhythm.      Pulses:           Dorsalis pedis pulses are 2+ on the right side and 2+ on the left side.      " Heart sounds: No murmur heard.  Pulmonary:      Effort: Pulmonary effort is normal.      Breath sounds: Normal breath sounds. No wheezing, rhonchi or rales.   Abdominal:      General: Bowel sounds are normal. There is no distension.      Palpations: Abdomen is soft.      Tenderness: There is no abdominal tenderness.   Musculoskeletal:         General: Normal range of motion.   Feet:      Right foot:      Protective Sensation: 3 sites tested. 0 sites sensed.      Skin integrity: Ulcer (small healing ulcers on the 1st, 2nd and 3rd tips of the toes distal to the nail, no erythema or drainage, nontender) present. No blister.      Toenail Condition: Right toenails are abnormally thick.      Left foot:      Protective Sensation: 3 sites tested. 0 sites sensed.      Skin integrity: Skin integrity normal. No ulcer or blister.      Toenail Condition: Left toenails are abnormally thick.      Comments: Diabetic Foot Exam Performed     Neurological:      Mental Status: She is alert.   Psychiatric:         Mood and Affect: Mood and affect normal.         Behavior: Behavior normal.         Thought Content: Thought content normal.         Judgment: Judgment normal.        Lab Results   Component Value Date    GLUCOSE 125 (H) 11/19/2021    BUN 23 11/19/2021    CREATININE 1.07 (H) 11/19/2021    EGFRIFNONA 52 (L) 11/19/2021    BCR 21.5 11/19/2021    K 4.2 11/19/2021    CO2 26.6 11/19/2021    CALCIUM 9.7 11/19/2021    ALBUMIN 4.30 11/19/2021    LABIL2 1.2 (L) 04/02/2021    AST 25 11/19/2021    ALT 26 11/19/2021     Lab Results   Component Value Date    HGBA1C 6.63 (H) 11/19/2021     Lab Results   Component Value Date    CHOL 176 11/19/2021    CHLPL 172 04/02/2021    TRIG 264 (H) 11/19/2021    HDL 46 11/19/2021    LDL 86 11/19/2021            Assessment and Plan    Diagnoses and all orders for this visit:    1. Generalized osteoarthritis (Primary)  Assessment & Plan:  Stable on current regimen.  Symptoms are well controlled.  No  adverse effects. She does require ongoing use of this controlled substance to function.  Tox screen was due today.  Prior tox screen appropriate.  BRENT was run today.  Refills were not needed today.  RTC 3 months.        2. High risk medication use  -     POC Urine Drug Screen [facility]    3. Type 2 diabetes mellitus with diabetic polyneuropathy, without long-term current use of insulin ([facility])  Assessment & Plan:  She is on metformin and glimepiride for diabetes.   She is UTD on eye exam (3/2022), done at [place].  She is due for foot exam (11/2021); exam today shows loss of protective sensation.  She has a couple little ulcers on the tips of her toes where she has nicked herself while trying to trim the nails.  We discussed today the benefits of following with a podiatrist under the circumstances.  Referral placed.  She is on a statin.   She is on gabapentin for diabetic peripheral neuropathy.  Pain is fairly well controlled.  Denies adverse effects.    Orders:  -     Comprehensive Metabolic Panel; Future  -     Lipid Panel; Future  -     Hemoglobin A1c; Future  -     Microalbumin / Creatinine Urine Ratio - Urine, Clean Catch; Future  -     Ambulatory Referral to Podiatry    4. Essential hypertension  Assessment & Plan:  Blood pressure is running at goal at home.  Continue atenolol/chlorthalidone 100/25 mg daily.  No refills needed.  Checking labs.      5. Mixed hyperlipidemia  Assessment & Plan:  Stable on atorvastatin 20 mg daily.  No refills needed.  Checking labs.      6. Bilateral hand numbness  Assessment & Plan:  Bilateral hand numbness.  Possibly due to diabetic peripheral neuropathy, but I am going to send her for EMG/NCV in case there is some kind of an anatomic problem in which we can intervene such as carpal tunnel or similar.    Orders:  -     EMG & Nerve Conduction Test; Future  -     CBC & Differential; Future  -     TSH; Future  -     Vitamin B1, Whole Blood; Future  -     Vitamin B12 &  Folate; Future    7. Obesity, morbid (HCC)  Assessment & Plan:  Patient's (Body mass index is 54.81 kg/m².) indicates that they are morbidly/severely obese (BMI > 40 or > 35 with obesity - related health condition) with health conditions that include hypertension and diabetes mellitus . Weight is worsening. BMI is is above average; BMI management plan is completed. We discussed portion control and increasing exercise.         Follow Up   Return in about 3 months (around 4/20/2023) for Medicare Wellness.  Patient was given instructions and counseling regarding her condition or for health maintenance advice. Please see specific information pulled into the AVS if appropriate.     Answers for HPI/ROS submitted by the patient on 8/6/2021  Please describe your symptoms.: Follow-up appointment  Have you had these symptoms before?: No  How long have you been having these symptoms?: Greater than 2 weeks  Please list any medications you are currently taking for this condition.: 0  Please describe any probable cause for these symptoms. : 0  What is the primary reason for your visit?: Other

## 2023-01-20 NOTE — ASSESSMENT & PLAN NOTE
Blood pressure is running at goal at home.  Continue atenolol/chlorthalidone 100/25 mg daily.  No refills needed.  Checking labs.

## 2023-01-20 NOTE — ASSESSMENT & PLAN NOTE
She is on metformin and glimepiride for diabetes.   She is UTD on eye exam (3/2022), done at [place].  She is due for foot exam (11/2021); exam today shows loss of protective sensation.  She has a couple little ulcers on the tips of her toes where she has nicked herself while trying to trim the nails.  We discussed today the benefits of following with a podiatrist under the circumstances.  Referral placed.  She is on a statin.   She is on gabapentin for diabetic peripheral neuropathy.  Pain is fairly well controlled.  Denies adverse effects.

## 2023-01-20 NOTE — ASSESSMENT & PLAN NOTE
Stable on current regimen.  Symptoms are well controlled.  No adverse effects. She does require ongoing use of this controlled substance to function.  Tox screen was due today.  Prior tox screen appropriate.  BRENT was run today.  Refills were not needed today.  RTC 3 months.

## 2023-01-27 ENCOUNTER — LAB (OUTPATIENT)
Dept: LAB | Facility: HOSPITAL | Age: 63
End: 2023-01-27
Payer: MEDICARE

## 2023-01-27 DIAGNOSIS — D50.9 IRON DEFICIENCY ANEMIA, UNSPECIFIED IRON DEFICIENCY ANEMIA TYPE: Primary | ICD-10-CM

## 2023-01-27 DIAGNOSIS — E11.42 TYPE 2 DIABETES MELLITUS WITH DIABETIC POLYNEUROPATHY, WITHOUT LONG-TERM CURRENT USE OF INSULIN: ICD-10-CM

## 2023-01-27 LAB
ALBUMIN UR-MCNC: <1.2 MG/DL
CREAT UR-MCNC: 92.8 MG/DL
MICROALBUMIN/CREAT UR: NORMAL MG/G{CREAT}
VIT B1 BLD-SCNC: 84.7 NMOL/L (ref 66.5–200)

## 2023-01-27 PROCEDURE — 82043 UR ALBUMIN QUANTITATIVE: CPT

## 2023-01-27 PROCEDURE — 82570 ASSAY OF URINE CREATININE: CPT

## 2023-01-30 DIAGNOSIS — M15.9 GENERALIZED OSTEOARTHRITIS: ICD-10-CM

## 2023-01-30 NOTE — TELEPHONE ENCOUNTER
Last tox/ LV 1/20/23   Duration Of Freeze Thaw-Cycle (Seconds): 2 Number Of Freeze-Thaw Cycles: 1 freeze-thaw cycle Post-Care Instructions: I reviewed with the patient in detail post-care instructions. Patient is to wear sunprotection, and avoid picking at any of the treated lesions. Pt may apply Vaseline to crusted or scabbing areas. Render Post-Care Instructions In Note?: no Detail Level: Detailed Consent: The patient's consent was obtained including but not limited to risks of crusting, scabbing, blistering, scarring, darker or lighter pigmentary change, recurrence, incomplete removal and infection. Medical Necessity Information: It is in your best interest to select a reason for this procedure from the list below. All of these items fulfill various CMS LCD requirements except the new and changing color options. Medical Necessity Clause: This procedure was medically necessary because the lesions that were treated were:

## 2023-01-30 NOTE — TELEPHONE ENCOUNTER
Caller: Sarahi Palafox    Relationship: Self    Best call back number: [phone]    Requested Prescriptions:   Requested Prescriptions     Pending Prescriptions Disp Refills   • traMADol (ULTRAM) 50 MG tablet 30 tablet 2     Sig: Take 1 tablet by mouth 3 (Three) Times a Day As Needed (pain). moderate pain        Pharmacy where request should be sent: [facility] - [address] [phone] [facility] [phone]      Additional details provided by patient: PATIENT HAS ABOUT A WEEK'S SUPPLY OF MEDICATION LEFT. PLEASE SEND NEW PRESCRIPTION WITH REFILLS TO THE PHARMACY ASAP AS THIS IS MAIL ORDER.    Does the patient have less than a 3 day supply:  [] Yes  [x] No    Would you like a call back once the refill request has been completed: [] Yes [] No    If the office needs to give you a call back, can they leave a voicemail: [] Yes [] No    Cassidy Lye Rep   01/30/23 12:59 EST

## 2023-01-31 RX ORDER — TRAMADOL HYDROCHLORIDE 50 MG/1
50 TABLET ORAL 3 TIMES DAILY PRN
Qty: 30 TABLET | Refills: 2 | Status: SHIPPED | OUTPATIENT
Start: 2023-01-31 | End: 2023-04-05 | Stop reason: SDUPTHER

## 2023-02-07 DIAGNOSIS — R20.0 BILATERAL HAND NUMBNESS: ICD-10-CM

## 2023-02-10 ENCOUNTER — LAB (OUTPATIENT)
Dept: LAB | Facility: HOSPITAL | Age: 63
End: 2023-02-10
Payer: MEDICARE

## 2023-02-10 DIAGNOSIS — D50.9 IRON DEFICIENCY ANEMIA, UNSPECIFIED IRON DEFICIENCY ANEMIA TYPE: Primary | ICD-10-CM

## 2023-02-10 LAB — HEMOCCULT STL QL IA: NEGATIVE

## 2023-02-10 PROCEDURE — 82274 ASSAY TEST FOR BLOOD FECAL: CPT

## 2023-02-27 DIAGNOSIS — L89.311 PRESSURE INJURY OF RIGHT BUTTOCK, STAGE 1: ICD-10-CM

## 2023-03-16 ENCOUNTER — TELEPHONE (OUTPATIENT)
Dept: FAMILY MEDICINE CLINIC | Age: 63
End: 2023-03-16

## 2023-03-16 NOTE — TELEPHONE ENCOUNTER
Caller: Sarahi Palafox    Relationship: Self    What was the call regarding: PATIENT CALLING TO INFORM THE CHANGE OF PHARMACY.     Do you require a callback: NO

## 2023-03-17 NOTE — TELEPHONE ENCOUNTER
Provider: DR. ACEVES    Caller: HONG    Relationship to Patient: SELF    Pharmacy: [facility] ([facility] - [place], [place] - [address] [phone] [facility] [phone]   [phone]    Phone Number: [phone]    Reason for Call: THE PATIENT STATED THE NEW PHARMACY WILL BE [facility]

## 2023-03-17 NOTE — TELEPHONE ENCOUNTER
Left detailed message on VM that I need to know what pharm she is changing to. OK FOR HUB TO READ

## 2023-03-20 RX ORDER — DICLOFENAC SODIUM 75 MG/1
75 TABLET, DELAYED RELEASE ORAL 2 TIMES DAILY WITH MEALS
Qty: 180 TABLET | Refills: 1 | Status: SHIPPED | OUTPATIENT
Start: 2023-03-20

## 2023-03-20 RX ORDER — ATENOLOL AND CHLORTHALIDONE TABLET 100; 25 MG/1; MG/1
1 TABLET ORAL DAILY
Qty: 90 TABLET | Refills: 1 | Status: SHIPPED | OUTPATIENT
Start: 2023-03-20

## 2023-03-24 ENCOUNTER — OFFICE VISIT (OUTPATIENT)
Dept: ORTHOPEDIC SURGERY | Facility: CLINIC | Age: 63
End: 2023-03-24
Payer: MEDICARE

## 2023-03-24 VITALS — TEMPERATURE: 96 F | WEIGHT: 293 LBS | BODY MASS INDEX: 44.41 KG/M2 | HEIGHT: 68 IN

## 2023-03-24 DIAGNOSIS — G56.21 CUBITAL TUNNEL SYNDROME ON RIGHT: ICD-10-CM

## 2023-03-24 DIAGNOSIS — M25.532 BILATERAL WRIST PAIN: Primary | ICD-10-CM

## 2023-03-24 DIAGNOSIS — M25.531 BILATERAL WRIST PAIN: Primary | ICD-10-CM

## 2023-03-24 DIAGNOSIS — G56.01 SEVERE CARPAL TUNNEL SYNDROME OF RIGHT WRIST: ICD-10-CM

## 2023-03-24 DIAGNOSIS — G56.02 SEVERE CARPAL TUNNEL SYNDROME OF LEFT WRIST: ICD-10-CM

## 2023-03-24 DIAGNOSIS — G56.22 CUBITAL TUNNEL SYNDROME ON LEFT: ICD-10-CM

## 2023-03-24 PROCEDURE — 20526 THER INJECTION CARP TUNNEL: CPT | Performed by: PHYSICIAN ASSISTANT

## 2023-03-24 PROCEDURE — 99204 OFFICE O/P NEW MOD 45 MIN: CPT | Performed by: PHYSICIAN ASSISTANT

## 2023-03-24 PROCEDURE — 1160F RVW MEDS BY RX/DR IN RCRD: CPT | Performed by: PHYSICIAN ASSISTANT

## 2023-03-24 PROCEDURE — 1159F MED LIST DOCD IN RCRD: CPT | Performed by: PHYSICIAN ASSISTANT

## 2023-03-24 RX ORDER — METHYLPREDNISOLONE ACETATE 80 MG/ML
80 INJECTION, SUSPENSION INTRA-ARTICULAR; INTRALESIONAL; INTRAMUSCULAR; SOFT TISSUE
Status: COMPLETED | OUTPATIENT
Start: 2023-03-24 | End: 2023-03-24

## 2023-03-24 RX ADMIN — METHYLPREDNISOLONE ACETATE 80 MG: 80 INJECTION, SUSPENSION INTRA-ARTICULAR; INTRALESIONAL; INTRAMUSCULAR; SOFT TISSUE at 10:46

## 2023-03-24 NOTE — PROGRESS NOTES
"Chief Complaint  Establish Care and Numbness of the Right Wrist and Establish Care and Numbness of the Left Wrist    Subjective    History of Present Illness      Sarahi Palafox is a 62 y.o. female who presents to [facility] for bilateral wrist pain.  She reports a history of numbness, tingling and pain that affects the wrist and into the hands, affecting the first through fourth fingers.  She reports the left side is greater than the right.  Her symptoms began approximately 1 year ago.  She also reports nocturnal paresthesias.  She also reports her symptoms are exacerbated by repetitive use of the upper extremities.  She is right-hand dominant.  She does have a diagnosis of diabetes, x 5 years. She is currently on gabapentin for diabetic neuropathy of her feet. She reports the gabapentin does help her feet, but has not helped with her hands. Her numbness and tingling is present in the fingertips of the right hand and in the entire fingers of the left. Her symptoms are constant.             Objective   Vital Signs:   Temp 96 °F (35.6 °C)   Ht 172.7 cm (68\")   Wt (!) 163 kg (360 lb)   BMI 54.74 kg/m²       Physical Exam  Vitals and nursing note reviewed.   Constitutional:       Appearance: Normal appearance.   Pulmonary:      Effort: Pulmonary effort is normal.   Skin:     General: Skin is warm and dry.      Capillary Refill: Capillary refill takes less than 2 seconds.   Neurological:      General: No focal deficit present.      Mental Status: She is alert and oriented to person, place, and time. Mental status is at baseline.   Psychiatric:         Mood and Affect: Mood normal.         Behavior: Behavior normal.         Thought Content: Thought content normal.         Judgment: Judgment normal.       Ortho Exam   BILATERAL wrist  She is right hand dominant.   Positive for mild swelling over the volar aspect of the wrist.   Negative Tinel's sign at the wrist.        Negative Phalen's " sign at the wrist .   strength is impaired.   Radial pulse is palpable. Capillary refill is 2 seconds with a brisk return.   There is wasting of the thenar eminence.  Sensation to light touch and pinprick are diminished over the thumb, index and middle fingers.  Flexor and extensor tendon functions are well preserved.   Moving 2 point discrimination is prolonged over the median nerve distribution.  There is no clinical evidence of renal disease, thyroid disease. She is diabetic.  No evidence of RSD.      BILATERAL elbow  Relationship of 3 bony points of the elbow is well preserved.   There is no subluxation of the ulnar nerve anteriorly.   Range of motion of the elbow is (0-135 degrees extension-flexion).   There is no instability of the ulnar nerve.   There is tenderness over the cubital tunnel in full flexion but no anterior subluxation of the nerve is noted.   Distally there is subjective weakness of the interosseous muscle function and slightly prolonged moving 2 point discrimination of ulnar nerve territory.   Tinel's sign is positive over the posterior aspect of the medial epicondyle, over the course of the ulnar nerve, over the cubital tunnel region.   Ulnar artery pulses are palpable. Skin and soft tissues are essentially normal.      Result Review :   EMG/NCS testing results/report - see below for summary  Testing performed at [facility], testing performed on 2/3/2023, summary of impression below:  · Right: Right median neuropathy at the wrist (carpal tunnel syndrome), moderate to severe and mixed in type.  Right ulnar neuropathy at or about the elbow, moderate in severity and mixed in type.  · Left: Left median neuropathy at the wrist (carpal tunnel syndrome), severe and mixed in type.  Left ulnar neuropathy at or about the elbow that is moderate in severity and mixed in type.        PROCEDURE  - Hand/Upper Extremity Injection: bilateral carpal tunnel for carpal tunnel syndrome on 3/24/2023 10:46  AM  Indications: pain  Details: 27 G needle, volar approach  Medications (Right): 80 mg methylPREDNISolone acetate 80 MG/ML  Medications (Left): 80 mg methylPREDNISolone acetate 80 MG/ML  Outcome: tolerated well, no immediate complications  Procedure, treatment alternatives, risks and benefits explained, specific risks discussed. Consent was given by the patient. Immediately prior to procedure a time out was called to verify the correct patient, procedure, equipment, support staff and site/side marked as required. Patient was prepped and draped in the usual sterile fashion.                  Assessment   Assessment and Plan    Diagnoses and all orders for this visit:    1. Bilateral wrist pain (Primary)  -     XR Wrist 3+ View Right; Future  -     XR Wrist 3+ View Left; Future    2. Severe carpal tunnel syndrome of left wrist  -     - Hand/Upper Extremity Injection: bilateral carpal tunnel    3. Cubital tunnel syndrome on left    4. Cubital tunnel syndrome on right    5. Severe carpal tunnel syndrome of right wrist  -     - Hand/Upper Extremity Injection: bilateral carpal tunnel         Follow Up   · Discussion of any imaging in detail. Discussion of orthopaedic goals.  · Risk, benefits, and merits of treatment alternatives reviewed with the patient. Treatment alternatives include: surgery, bracing and injections of steroid in the wrists. She would like to hold off on surgery and would like to try the bracing and injections in the wrists. Discussed the possibility of further nerve damage the longer she waits to have the surgery, she verbalizes understanding.  · Ice, heat, and/or modalities as beneficial  · Patient is encouraged to call or return for any issues or concerns.  · No brace was given at today's visit. She believes she has bracing at home but will let us know if she ends up needing braces.   · Risks and benefits of injection therapy discussed with patient.  Possibility of bruising, pain, swelling,  infection, increased blood sugar levels and soft tissue atrophy discussed in detail.   · Injected patient's bilateral wrist joint(s) with Depo-Medrol from a(n) volar approach.  Patient tolerated the procedure well and no complications were encountered.   · Follow up in 3 months  • Patient was given instructions and counseling regarding her condition or for health maintenance advice. Please see specific information pulled into the AVS if appropriate.     Daniel Goodwin PA-C   Date of Encounter: 3/24/2023   Electronically signed by Daniel Goodwin PA-C, 03/24/23, 8:02 AM EDT.

## 2023-03-28 RX ORDER — FLUTICASONE PROPIONATE 50 MCG
1 SPRAY, SUSPENSION (ML) NASAL DAILY
Qty: 16 ML | Refills: 5 | Status: SHIPPED | OUTPATIENT
Start: 2023-03-28

## 2023-03-28 NOTE — TELEPHONE ENCOUNTER
Caller: Sarahi Palafox    Relationship: Self    Best call back number: [phone]    Requested Prescriptions:   Requested Prescriptions     Pending Prescriptions Disp Refills   • fluticasone (FLONASE) 50 MCG/ACT nasal spray 16 mL 5     Si spray by Each Nare route Daily.        Pharmacy where request should be sent: [facility] - [place] [address] [address] [facility] [phone] [facility] [phone]      Last office visit with prescribing clinician: 2023   Last telemedicine visit with prescribing clinician: 2023   Next office visit with prescribing clinician: 2023     Does the patient have less than a 3 day supply:  [x] Yes  [] No    Would you like a call back once the refill request has been completed: [x] Yes [] No    If the office needs to give you a call back, can they leave a voicemail: [x] Yes [] No    Cassidy Mohan Rep   23 14:18 EDT

## 2023-04-05 ENCOUNTER — TELEPHONE (OUTPATIENT)
Dept: FAMILY MEDICINE CLINIC | Age: 63
End: 2023-04-05
Payer: MEDICARE

## 2023-04-05 DIAGNOSIS — Z12.31 ENCOUNTER FOR SCREENING MAMMOGRAM FOR BREAST CANCER: Primary | ICD-10-CM

## 2023-04-05 DIAGNOSIS — M15.9 GENERALIZED OSTEOARTHRITIS: ICD-10-CM

## 2023-04-05 NOTE — TELEPHONE ENCOUNTER
----- Message from Kia Juarez MD sent at 4/5/2022 11:17 AM EDT -----  Regarding: f/u screening mammo  Please call pt to let her know that she is due for her yearly screening mammogram.  Please pend order for screening mammo and send to me.  Thanks, KATIANA

## 2023-04-06 ENCOUNTER — OFFICE VISIT (OUTPATIENT)
Dept: PODIATRY | Facility: CLINIC | Age: 63
End: 2023-04-06
Payer: MEDICARE

## 2023-04-06 VITALS
HEIGHT: 68 IN | OXYGEN SATURATION: 99 % | SYSTOLIC BLOOD PRESSURE: 152 MMHG | TEMPERATURE: 97.9 F | HEART RATE: 71 BPM | WEIGHT: 293 LBS | DIASTOLIC BLOOD PRESSURE: 75 MMHG | BODY MASS INDEX: 44.41 KG/M2

## 2023-04-06 DIAGNOSIS — E66.01 MORBID OBESITY: ICD-10-CM

## 2023-04-06 DIAGNOSIS — M79.672 FOOT PAIN, BILATERAL: ICD-10-CM

## 2023-04-06 DIAGNOSIS — E11.9 NON-INSULIN DEPENDENT TYPE 2 DIABETES MELLITUS: Primary | ICD-10-CM

## 2023-04-06 DIAGNOSIS — G62.9 NEUROPATHY: ICD-10-CM

## 2023-04-06 DIAGNOSIS — E11.8 DIABETIC FOOT: ICD-10-CM

## 2023-04-06 DIAGNOSIS — M79.671 FOOT PAIN, BILATERAL: ICD-10-CM

## 2023-04-06 PROCEDURE — 3077F SYST BP >= 140 MM HG: CPT | Performed by: PODIATRIST

## 2023-04-06 PROCEDURE — 3078F DIAST BP <80 MM HG: CPT | Performed by: PODIATRIST

## 2023-04-06 PROCEDURE — 1160F RVW MEDS BY RX/DR IN RCRD: CPT | Performed by: PODIATRIST

## 2023-04-06 PROCEDURE — G8404 LOW EXTEMITY NEUR EXAM DOCUM: HCPCS | Performed by: PODIATRIST

## 2023-04-06 PROCEDURE — 99203 OFFICE O/P NEW LOW 30 MIN: CPT | Performed by: PODIATRIST

## 2023-04-06 PROCEDURE — 1159F MED LIST DOCD IN RCRD: CPT | Performed by: PODIATRIST

## 2023-04-06 PROCEDURE — 11721 DEBRIDE NAIL 6 OR MORE: CPT | Performed by: PODIATRIST

## 2023-04-06 RX ORDER — TRAMADOL HYDROCHLORIDE 50 MG/1
50 TABLET ORAL 3 TIMES DAILY PRN
Qty: 30 TABLET | Refills: 0 | Status: SHIPPED | OUTPATIENT
Start: 2023-04-06

## 2023-04-06 NOTE — PROGRESS NOTES
[facility]    Today's Date: 23    Patient Name: Sarahi Palafox  MRN: [id number]  CSN: [id number]  PCP: Kia Juarez MD, Last PCP Visit:  2023  Referring Provider: Kia Juarez*    SUBJECTIVE     Chief Complaint   Patient presents with   • Left Foot - Establish Care, Annual Exam, Diabetes   • Right Foot - Establish Care, Annual Exam, Diabetes     HPI: Sarahi Palafox, a 62 y.o.female, presents to clinic for painful toenail and a diabetic foot evaluation.    New, Established, New Problem:  New  Location:  Toenails  Duration:   Greater than five years  Onset:  Gradual  Nature:  sore with palpation.  Stable, worsening, improving:   worsening  Aggravating factors:  Pain with shoe gear and ambulation.  Previous Treatment: Unable to trim their own toenails.    Patient controlling diabetes via: Oral medication    Patient states their last blood glucose was:  130    Patient denies any fevers, chills, nausea, vomiting, shortness of breath, nor any other constitutional signs nor symptoms.    Patient has numbness throughout her feet.    Past Medical History:   Diagnosis Date   • Allergic    • Arthritis    • Dental disease 2014    I'm in the process of getting dentures   • Diabetes mellitus    • Difficulty walking    • Eye drainage     left eye   • Gastroesophageal reflux disease without esophagitis    • Hyperlipidemia    • Hypertension    • Ingrown toenail    • Nasal cavity mass    • Nasal polyp    • Neuropathy     feet and hands   • Neuropathy in diabetes Not sure but has gotten worse in the last 6 months   • Nosebleed 2021    I haven't had one in a while   • PONV (postoperative nausea and vomiting)    • Bacon splints    • Sleep apnea 2014    I use a CPAP machine since 2015     Past Surgical History:   Procedure Laterality Date   •  SECTION  ,  and    • ENDOSCOPIC FUNCTIONAL SINUS SURGERY (FESS) Left 10/4/2021     Procedure: ENDOSCOPIC FUNCTIONAL SINUS SURGERY with excision of left nasal cavity mass;  Surgeon: Alberto Tavera MD;  Location: [facility] MAIN OR;  Service: ENT;  Laterality: Left;   • KNEE SURGERY Left    • TUBAL ABDOMINAL LIGATION  1992     Family History   Problem Relation Age of Onset   • Alcohol abuse Father    • COPD Father    • Hypertension Father    • Heart disease Father    • Arthritis Mother    • Diabetes Mother    • Hypertension Mother    • Osteoporosis Mother    • Asthma Daughter      Social History     Socioeconomic History   • Marital status:    Tobacco Use   • Smoking status: Never   • Smokeless tobacco: Never   Vaping Use   • Vaping Use: Never used   Substance and Sexual Activity   • Alcohol use: Not Currently     Comment: I haven't drank for at least 20 years.   • Drug use: Never   • Sexual activity: Not Currently     Partners: Male     Birth control/protection: None     Allergies   Allergen Reactions   • Penicillins Rash     Current Outpatient Medications   Medication Sig Dispense Refill   • atenolol-chlorthalidone (TENORETIC) 100-25 MG per tablet Take 1 tablet by mouth Daily. 90 tablet 1   • atorvastatin (LIPITOR) 20 MG tablet Take 1 tablet by mouth Daily. 90 tablet 1   • diclofenac (VOLTAREN) 75 MG EC tablet Take 1 tablet by mouth 2 (Two) Times a Day With Meals. 180 tablet 1   • fluticasone (FLONASE) 50 MCG/ACT nasal spray 1 spray by Each Nare route Daily. 16 mL 5   • gabapentin (NEURONTIN) 400 MG capsule Take 1 capsule by mouth every night at bedtime. 90 capsule 2   • gemfibrozil (LOPID) 600 MG tablet Take 1 tablet by mouth Daily.     • glimepiride (AMARYL) 1 MG tablet Take 1 tablet by mouth Every Morning Before Breakfast. 90 tablet 1   • loratadine (CLARITIN) 10 MG tablet Take 1 tablet by mouth Daily.     • metFORMIN (GLUCOPHAGE) 1000 MG tablet Take 1 tablet by mouth 2 (Two) Times a Day. 180 tablet 1   • omeprazole (priLOSEC) 20 MG capsule Take 1 capsule by mouth Daily. 90 capsule 1    • silver sulfadiazine (SSD) 1 % cream Apply  topically to the appropriate area as directed See Admin Instructions. Apply topically to the affected areas twice daily as directed 75 g 2   • solifenacin (VESICARE) 5 MG tablet Take 1 tablet by mouth Daily. 90 tablet 1   • traMADol (ULTRAM) 50 MG tablet Take 1 tablet by mouth 3 (Three) Times a Day As Needed (pain). moderate pain 30 tablet 0   • Vitamin D, Cholecalciferol, 50 MCG (2000 UT) capsule Take 1 capsule by mouth Daily.       No current facility-administered medications for this visit.     Review of Systems   Constitutional: Negative.    Skin:        Painful toenails   Neurological: Positive for numbness.   All other systems reviewed and are negative.      OBJECTIVE     Vitals:    23 1049   BP: 152/75   Pulse: 71   Temp: 97.9 °F (36.6 °C)   SpO2: 99%       Body mass index is 54.74 kg/m².    Lab Results   Component Value Date    HGBA1C 5.90 (H) 2023       Lab Results   Component Value Date    GLUCOSE 75 2023    CALCIUM 9.5 2023     2023    K 4.2 2023    CO2 25.7 2023    CL 98 2023    BUN 24 (H) 2023    CREATININE 1.15 (H) 2023    EGFRIFNONA 52 (L) 2021    BCR 20.9 2023    ANIONGAP 14.3 2023       Patient seen in no apparent distress.      PHYSICAL EXAM:     Foot/Ankle Exam:       General:   Diabetic Foot Exam Performed    Appearance comment:  Morbid obesity  Orientation: AAOx3    Affect: appropriate    Gait: unimpaired    Shoes: Crocs.    VASCULAR      Right Foot Vascularity   Normal vascular exam    Dorsalis pedis:  1+  Posterior tibial:  1+  Skin Temperature: cool    Edema Gradin+ and non-pitting  CFT:  < 3 seconds  Pedal Hair Growth:  Absent  Varicosities: mild varicosities       Left Foot Vascularity   Normal vascular exam    Dorsalis pedis:  1+  Posterior tibial:  1+  Skin Temperature: cool    Edema Gradin+ and non-pitting  CFT:  < 3 seconds  Pedal Hair Growth:   Absent  Varicosities: mild varicosities        NEUROLOGIC     Right Foot Neurologic   Light touch sensation:  Absent  Vibratory sensation:  Absent  Hot/Cold sensation: absent    Protective Sensation using [place] Monofilament:  0     Left Foot Neurologic   Light touch sensation:  Absent  Vibratory sensation:  Absent  Hot/cold sensation: absent    Protective Sensation using [place] Monofilament:  0     MUSCLE STRENGTH     Right Foot Muscle Strength   Foot dorsiflexion:  4  Foot plantar flexion:  4  Foot inversion:  4  Foot eversion:  4     Left Foot Muscle Strength   Foot dorsiflexion:  4  Foot plantar flexion:  4  Foot inversion:  4  Foot eversion:  4     RANGE OF MOTION      Right Foot Range of Motion   Foot and ankle ROM within normal limits       Left Foot Range of Motion   Foot and ankle ROM within normal limits       DERMATOLOGIC     Right Foot Dermatologic   Skin: skin intact    Nails: onychomycosis, abnormally thick, subungual debris and dystrophic nails    Nails comment:  Toenails 1, 2, 3, 4, and 5     Left Foot Dermatologic   Skin: skin intact    Nails: onychomycosis, abnormally thick, subungual debris, dystrophic nails and ingrown toenail    Nails comment:  Toenails 1, 2, 3, 4, and 5      Diabetic Foot Exam Performed      ASSESSMENT/PLAN     Diagnoses and all orders for this visit:    1. Non-insulin dependent type 2 diabetes mellitus (Primary)    2. Diabetic foot    3. Neuropathy    4. Foot pain, bilateral    5. Morbid obesity    A prescription was written for diabetic shoes.    Comprehensive lower extremity examination and evaluation was performed.    Discussed findings and treatment plan including risks, benefits, and treatment options with patient in detail. Patient agreed with treatment plan.    Medications and allergies reviewed.  Reviewed available blood glucose and HgB A1C lab values along with other pertinent labs.  These were discussed with the patient as to their importance of  diabetic maintenance.    Toenails 1, 2, 3, 4, 5 on Right and 1, 2, 3, 4, 5 on Left were debrided with nail nippers then filed with a Johnmel nail candi.  Patient tolerated procedure well without complications.    Diabetic foot exam performed and documented this date, compliant with CQM required standards. Detail of findings as noted in physical exam.  Lower extremity Neurologic exam for diabetic patient performed and documented this date, compliant with PQRS required standards. Detail of findings as noted in physical exam.  Advised patient importance of good routine lower extremity hygiene. Advised patient importance of evaluating for intact skin and pain free nail borders.  Advised patient to use mirror to evaluate plantar/ soles of feet for better visualization. Advised patient monitor and phone office to be seen if any cracking to skin, open lesions, painful nail borders or if nails become elongated prior to next visit. Advised patient importance of daily cleansing of lower extremities, followed by good skin cream to maintain normal hydration of skin. Also advised patient importance of close daily monitoring of blood sugar. Advised to regulate diet and medications to maintain control of blood sugar in optimal range. Contact primary care provider if difficulties maintaining blood sugar levels.  Advised Patient of presence of Diabetes Mellitus condition.  Advised Patient risk of progression and worsening or improvement, then return of condition.  Will monitor condition for any change in future. Treat with most appropriate treatment pending status of condition.  Counseled and advised patient extensively on nature and ramifications of diabetes. Standard instructions given to patient for good diabetic foot care and maintenance. Advised importance of careful monitoring to avoid break down and complications secondary to diabetes. Advised patient importance of strict maintenance of blood sugar control. Advised patient of  possible ominous results from neglect of condition, i.e.: amputation/ loss of digits, feet and legs, or even death.  Patient states understands counseling, will monitor closely, continue good hygiene and routine diabetic foot care. Patient will contact office should they have any questions or problems.      An After Visit Summary was printed and given to the patient at discharge, including (if requested) any available informative/educational handouts regarding diagnosis, treatment, or medications. All questions were answered to patient/family satisfaction. Should symptoms fail to improve or worsen they agree to call or return to clinic or to go to the Emergency Department. Discussed the importance of following up with any needed screening tests/labs/specialist appointments and any requested follow-up recommended by me today. Importance of maintaining follow-up discussed and patient accepts that missed appointments can delay diagnosis and potentially lead to worsening of conditions.    Return in about 9 weeks (around 6/8/2023) for Toenail Care., or sooner if acute issues arise.    This document has been electronically signed by Flavio Golden DPM on April 6, 2023 11:16 EDT

## 2023-05-02 RX ORDER — ATENOLOL AND CHLORTHALIDONE TABLET 100; 25 MG/1; MG/1
TABLET ORAL
Qty: 90 TABLET | Refills: 1 | Status: SHIPPED | OUTPATIENT
Start: 2023-05-02

## 2023-05-05 ENCOUNTER — OFFICE VISIT (OUTPATIENT)
Dept: FAMILY MEDICINE CLINIC | Age: 63
End: 2023-05-05
Payer: MEDICARE

## 2023-05-05 VITALS
SYSTOLIC BLOOD PRESSURE: 136 MMHG | WEIGHT: 293 LBS | DIASTOLIC BLOOD PRESSURE: 82 MMHG | BODY MASS INDEX: 44.41 KG/M2 | HEART RATE: 57 BPM | OXYGEN SATURATION: 94 % | HEIGHT: 68 IN

## 2023-05-05 DIAGNOSIS — G47.33 OBSTRUCTIVE SLEEP APNEA: ICD-10-CM

## 2023-05-05 DIAGNOSIS — Z79.899 HIGH RISK MEDICATION USE: ICD-10-CM

## 2023-05-05 DIAGNOSIS — E66.01 OBESITY, MORBID: ICD-10-CM

## 2023-05-05 DIAGNOSIS — M15.9 GENERALIZED OSTEOARTHRITIS: ICD-10-CM

## 2023-05-05 DIAGNOSIS — Z00.00 ANNUAL PHYSICAL EXAM: Primary | ICD-10-CM

## 2023-05-05 DIAGNOSIS — I10 ESSENTIAL HYPERTENSION: ICD-10-CM

## 2023-05-05 DIAGNOSIS — E11.42 TYPE 2 DIABETES MELLITUS WITH DIABETIC POLYNEUROPATHY, WITHOUT LONG-TERM CURRENT USE OF INSULIN: ICD-10-CM

## 2023-05-05 DIAGNOSIS — E78.2 MIXED HYPERLIPIDEMIA: ICD-10-CM

## 2023-05-05 RX ORDER — GLIMEPIRIDE 1 MG/1
1 TABLET ORAL
Qty: 90 TABLET | Refills: 1 | Status: SHIPPED | OUTPATIENT
Start: 2023-05-05

## 2023-05-05 NOTE — ASSESSMENT & PLAN NOTE
She is on metformin and glimepiride for diabetes.     No refills needed today.  She is UTD on eye exam (4/2023), done at [place].  She is up-to-date on foot exam (4/2023).  She is on a statin.  She is on gabapentin for diabetic peripheral neuropathy.  Pain is fairly well controlled.  No adverse effects.  She is following with Podiatry Dr. Golden.

## 2023-05-05 NOTE — ASSESSMENT & PLAN NOTE
She is UTD on colon cancer screening, fecal occult blood done 2/2023 and this was negative.  One year repeat recommended.  She is due for pap smear, last done 1/2018 and this was NIL.  Gets these done at [facility].  She is due for mammogram, last done 4/2022 and this was normal; repeat is already scheduled for 7/2023.  She is up-to-date on COVID (due for bivalent booster).  She is due for Prevnar 20, Shingrix, Td.  Declines [id number].  Shingrix and Tdap can be done at the pharmacy.  Flu shot not currently dictated.  She is up-to-date on routine labs including DM panel and CBC.

## 2023-05-05 NOTE — ASSESSMENT & PLAN NOTE
Blood pressure running at goal stable on atenolol/chlorthalidone 100/25 mg daily.  No refills needed.  Labs reviewed and up-to-date.  Continue to monitor

## 2023-05-05 NOTE — PROGRESS NOTES
The ABCs of the Annual Wellness Visit  Subsequent Medicare Wellness Visit    Subjective    Sarahi Palafox is a 62 y.o. female who presents for a Subsequent Medicare Wellness Visit.    She is UTD on colon cancer screening, fecal occult blood done 2/2023 and this was negative.  One year repeat recommended.  She is due for pap smear, last done 1/2018 and this was NIL.  Gets these done at [facility].  She is due for mammogram, last done 4/2022 and this was normal; repeat is already scheduled for 7/2023.  She is up-to-date on COVID (due for bivalent booster).  She is due for Prevnar 20, Shingrix, Td.  Flu shot not currently dictated.  She is up-to-date on routine labs including DM panel and CBC.      The following portions of the patient's history were reviewed and   updated as appropriate: allergies, current medications, past family history, past medical history, past social history, past surgical history and problem list.    Compared to one year ago, the patient feels her physical   health is worse.    Compared to one year ago, the patient feels her mental   health is the same.    Recent Hospitalizations:  She was not admitted to the hospital during the last year.       Current Medical Providers:  Patient Care Team:  Kia Juarez MD as PCP - General (Family Medicine)  Flavio Golden DPM as Consulting Physician (Podiatry)  Daniel Goodwin PA-C as Physician Assistant (Orthopedic Surgery)    Outpatient Medications Prior to Visit   Medication Sig Dispense Refill   • atenolol-chlorthalidone (TENORETIC) 100-25 MG per tablet TAKE 1 TABLET DAILY 90 tablet 1   • atorvastatin (LIPITOR) 20 MG tablet Take 1 tablet by mouth Daily. 90 tablet 1   • diclofenac (VOLTAREN) 75 MG EC tablet Take 1 tablet by mouth 2 (Two) Times a Day With Meals. 180 tablet 1   • fluticasone (FLONASE) 50 MCG/ACT nasal spray 1 spray by Each Nare route Daily. 16 mL 5   • gemfibrozil (LOPID) 600 MG tablet Take  1 tablet by mouth Daily.     • loratadine (CLARITIN) 10 MG tablet Take 1 tablet by mouth Daily.     • metFORMIN (GLUCOPHAGE) 1000 MG tablet Take 1 tablet by mouth 2 (Two) Times a Day. 180 tablet 1   • omeprazole (priLOSEC) 20 MG capsule Take 1 capsule by mouth Daily. 90 capsule 1   • silver sulfadiazine (SSD) 1 % cream Apply  topically to the appropriate area as directed See Admin Instructions. Apply topically to the affected areas twice daily as directed 75 g 2   • solifenacin (VESICARE) 5 MG tablet Take 1 tablet by mouth Daily. 90 tablet 1   • traMADol (ULTRAM) 50 MG tablet Take 1 tablet by mouth 3 (Three) Times a Day As Needed (pain). moderate pain 30 tablet 0   • Vitamin D, Cholecalciferol, 50 MCG (2000 UT) capsule Take 1 capsule by mouth Daily.     • glimepiride (AMARYL) 1 MG tablet Take 1 tablet by mouth Every Morning Before Breakfast. 90 tablet 1   • gabapentin (NEURONTIN) 400 MG capsule Take 1 capsule by mouth every night at bedtime. 90 capsule 2     No facility-administered medications prior to visit.       Opioid medication/s are on active medication list.  and I have evaluated her active treatment plan and pain score trends (see table).  There were no vitals filed for this visit.  I have reviewed the chart for potential of high risk medication and harmful drug interactions in the elderly.            Aspirin is not on active medication list.  Aspirin use is not indicated based on review of current medical condition/s. Risk of harm outweighs potential benefits.  .    Patient Active Problem List   Diagnosis   • Essential hypertension   • Generalized osteoarthritis   • Mixed hyperlipidemia   • Gastroesophageal reflux disease without esophagitis   • Vitamin D deficiency   • Type 2 diabetes mellitus with diabetic polyneuropathy, without long-term current use of insulin   • Obstructive sleep apnea   • High risk medication use   • Nasal cavity mass   • Chronic pansinusitis   • Bilateral hand numbness   • Obesity,  "morbid   • Cubital tunnel syndrome on right   • Cubital tunnel syndrome on left   • Severe carpal tunnel syndrome of right wrist   • Severe carpal tunnel syndrome of left wrist   • Annual physical exam     Advance Care Planning   Advance Care Planning     Advance Directive is not on file.  ACP discussion was held with the patient during this visit. Patient does not have an advance directive, information provided.     Objective    Vitals:    23 1306 23 1349   BP: 151/87 136/82   BP Location: Left arm Left arm   Patient Position: Sitting Sitting   Cuff Size: Large Adult    Pulse: 63 57   SpO2: 94%    Weight: (!) 162 kg (357 lb 3.2 oz)    Height: 172.7 cm (67.99\")      Estimated body mass index is 54.32 kg/m² as calculated from the following:    Height as of this encounter: 172.7 cm (67.99\").    Weight as of this encounter: 162 kg (357 lb 3.2 oz).    Class 3 Severe Obesity (BMI >=40). Obesity-related health conditions include the following: obstructive sleep apnea, hypertension, diabetes mellitus, dyslipidemias and osteoarthritis. Obesity is improving with lifestyle modifications. BMI is is above average; BMI management plan is completed. We discussed portion control and increasing exercise.      Does the patient have evidence of cognitive impairment? No          HEALTH RISK ASSESSMENT    Smoking Status:  Social History     Tobacco Use   Smoking Status Never   Smokeless Tobacco Never     Alcohol Consumption:  Social History     Substance and Sexual Activity   Alcohol Use Not Currently    Comment: I haven't drank for at least 20 years.     Fall Risk Screen:    STEADI Fall Risk Assessment was completed, and patient is at LOW risk for falls.Assessment completed on:2023    Depression Screenin/5/2023     1:11 PM   PHQ-2/PHQ-9 Depression Screening   Little Interest or Pleasure in Doing Things 0-->not at all   Feeling Down, Depressed or Hopeless 0-->not at all   PHQ-9: Brief Depression Severity " Measure Score 0       Health Habits and Functional and Cognitive Screenin/5/2023     1:11 PM   Functional & Cognitive Status   Do you have difficulty preparing food and eating? No   Do you have difficulty bathing yourself, getting dressed or grooming yourself? No   Do you have difficulty using the toilet? No   Do you have difficulty moving around from place to place? Yes   Do you have trouble with steps or getting out of a bed or a chair? Yes   Current Diet Unhealthy Diet   Dental Exam Up to date   Eye Exam Up to date   Exercise (times per week) 0 times per week   Current Exercises Include No Regular Exercise   Do you need help using the phone?  No   Are you deaf or do you have serious difficulty hearing?  No   Do you need help with transportation? No   Do you need help shopping? No   Do you need help preparing meals?  No   Do you need help with housework?  No   Do you need help with laundry? No   Do you need help taking your medications? No   Do you need help managing money? No   Do you ever drive or ride in a car without wearing a seat belt? No   Have you felt unusual stress, anger or loneliness in the last month? Yes   Who do you live with? Spouse   If you need help, do you have trouble finding someone available to you? No   Have you been bothered in the last four weeks by sexual problems? No   Do you have difficulty concentrating, remembering or making decisions? No       Age-appropriate Screening Schedule:  Refer to the list below for future screening recommendations based on patient's age, sex and/or medical conditions. Orders for these recommended tests are listed in the plan section. The patient has been provided with a written plan.    Health Maintenance   Topic Date Due   • DXA SCAN  Never done   • TDAP/TD VACCINES (1 - Tdap) Never done   • ZOSTER VACCINE (1 of 2) Never done   • PAP SMEAR  2021   • COVID-19 Vaccine (4 - Booster for Pfizer series) 2023 (Originally 3/18/2022)   •  Pneumococcal Vaccine 0-64 (1 - PCV) 05/05/2024 (Originally 7/21/1966)   • HEMOGLOBIN A1C  07/20/2023   • INFLUENZA VACCINE  08/01/2023   • LIPID PANEL  01/20/2024   • URINE MICROALBUMIN  01/27/2024   • COLORECTAL CANCER SCREENING  02/10/2024   • MAMMOGRAM  04/04/2024   • DIABETIC FOOT EXAM  04/06/2024   • DIABETIC EYE EXAM  04/07/2024   • ANNUAL WELLNESS VISIT  05/05/2024   • HEPATITIS C SCREENING  Completed                  CMS Preventative Services Quick Reference  Risk Factors Identified During Encounter  Immunizations Discussed/Encouraged: Tdap, Prevnar 20 (Pneumococcal 20-valent conjugate), Shingrix and COVID19  The above risks/problems have been discussed with the patient.  Pertinent information has been shared with the patient in the After Visit Summary.  An After Visit Summary and PPPS were made available to the patient.    Follow Up:   Next Medicare Wellness visit to be scheduled in 1 year.       Additional E&M Note during same encounter follows:  Patient has multiple medical problems which are significant and separately identifiable that require additional work above and beyond the Medicare Wellness Visit.      Chief Complaint  Medicare Wellness-subsequent    Subjective        HPI  Sarahi Palafox is also being seen today for f/u of routine problems.    She is on tramadol and diclofenac for generalized OA.  No adverse effects.  Her pain is worst in the shoulders and knees.  Uses a cane to ambulate.  Has followed with Dr. Cisneros in the past but she is now following with Daniel Goodwin and Dr. Jones.  She was seen in their office back in March for bilateral cubital tunnel syndrome and bilateral carpal tunnel syndrome.  They performed steroid injections bilaterally at last visit and are planning a 3-month follow-up.  She is not a good surgical candidate.  Synvisc did not provide any significant relief.       She is on metformin and glimepiride for diabetes.   She is UTD on eye exam (4/2023), done at [place].   "She is up-to-date on foot exam (4/2023).  She is on a statin.  She is on gabapentin for diabetic peripheral neuropathy.  Pain is fairly well controlled.  No adverse effects.  She is following with Podiatry Dr. Golden.     She is on atenolol/chlorthalidone for hypertension.  Her BP has been well controlled.  She does usually have some white coat HTN.  No problems with chest pain, shortness of breath or palpitations.     She is on atorvastatin for HLD.  No problems with myalgias.      She is on Vesicare for urge incontinence.  Urinary sx well controlled.     She is on omeprazole for GERD.  No heartburn or indigestion.     She uses her CPAP religiously every night for sleep apnea.    Review of Systems   Constitutional: Negative for chills, fatigue and fever.   HENT: Negative for congestion, hearing loss and rhinorrhea.    Eyes: Negative for pain and visual disturbance.   Respiratory: Negative for cough and shortness of breath.    Cardiovascular: Negative for chest pain and palpitations.   Gastrointestinal: Negative for abdominal pain, constipation, diarrhea, nausea and vomiting.   Genitourinary: Negative for difficulty urinating and dysuria.   Musculoskeletal: Positive for arthralgias and back pain. Negative for myalgias.   Neurological: Positive for numbness (bilateral hands). Negative for weakness.   Psychiatric/Behavioral: Negative for dysphoric mood and sleep disturbance. The patient is not nervous/anxious.        Objective   Vital Signs:  /82 (BP Location: Left arm, Patient Position: Sitting)   Pulse 57   Ht 172.7 cm (67.99\")   Wt (!) 162 kg (357 lb 3.2 oz)   SpO2 94%   BMI 54.32 kg/m²     Physical Exam  Vitals reviewed.   Constitutional:       General: She is not in acute distress.     Appearance: Normal appearance. She is well-developed. She is obese.   HENT:      Head: Normocephalic and atraumatic.      Right Ear: External ear normal.      Left Ear: External ear normal.      Nose: Nose normal.      " Mouth/Throat:      Mouth: Mucous membranes are moist.   Eyes:      Extraocular Movements: Extraocular movements intact.      Conjunctiva/sclera: Conjunctivae normal.      Pupils: Pupils are equal, round, and reactive to light.   Cardiovascular:      Rate and Rhythm: Normal rate and regular rhythm.      Heart sounds: No murmur heard.  Pulmonary:      Effort: Pulmonary effort is normal.      Breath sounds: Normal breath sounds. No wheezing, rhonchi or rales.   Abdominal:      General: Bowel sounds are normal. There is no distension.      Palpations: Abdomen is soft.      Tenderness: There is no abdominal tenderness.   Musculoskeletal:         General: Normal range of motion.   Skin:     General: Skin is warm and dry.   Neurological:      Mental Status: She is alert and oriented to person, place, and time.      Deep Tendon Reflexes: Reflexes normal.   Psychiatric:         Mood and Affect: Mood and affect normal.         Behavior: Behavior normal.         Thought Content: Thought content normal.         Judgment: Judgment normal.                    Lab Results   Component Value Date    HGBA1C 5.90 (H) 01/20/2023     Lab Results   Component Value Date    GLUCOSE 75 01/20/2023    BUN 24 (H) 01/20/2023    CREATININE 1.15 (H) 01/20/2023    EGFR 54.3 (L) 01/20/2023    BCR 20.9 01/20/2023    K 4.2 01/20/2023    CO2 25.7 01/20/2023    CALCIUM 9.5 01/20/2023    ALBUMIN 3.8 01/20/2023    BILITOT 0.2 01/20/2023    AST 20 01/20/2023    ALT 19 01/20/2023     Lab Results   Component Value Date    CHOL 173 01/20/2023    CHLPL 172 04/02/2021    TRIG 214 (H) 01/20/2023    HDL 45 01/20/2023    LDL 92 01/20/2023          Assessment and Plan   Diagnoses and all orders for this visit:    1. Annual physical exam (Primary)  Assessment & Plan:  She is UTD on colon cancer screening, fecal occult blood done 2/2023 and this was negative.  One year repeat recommended.  She is due for pap smear, last done 1/2018 and this was NIL.  Gets these done  at [facility].  She is due for mammogram, last done 4/2022 and this was normal; repeat is already scheduled for 7/2023.  She is up-to-date on COVID (due for bivalent booster).  She is due for Prevnar 20, Shingrix, Td.  Declines Cdptofu11.  Shingrix and Tdap can be done at the pharmacy.  Flu shot not currently dictated.  She is up-to-date on routine labs including DM panel and CBC.        2. Generalized osteoarthritis  Assessment & Plan:  Stable on current regimen.  Symptoms are well controlled.  No adverse effects. She does require ongoing use of this controlled substance to function.  Tox screen was due today.  Prior tox screen appropriate.  BRENT was run today.  Refills were not needed today.  RTC 3 months.        3. High risk medication use  -     POC Urine Drug Screen [facility]    4. Type 2 diabetes mellitus with diabetic polyneuropathy, without long-term current use of insulin  Assessment & Plan:  She is on metformin and glimepiride for diabetes.     No refills needed today.  She is UTD on eye exam (4/2023), done at [place].  She is up-to-date on foot exam (4/2023).  She is on a statin.  She is on gabapentin for diabetic peripheral neuropathy.  Pain is fairly well controlled.  No adverse effects.  She is following with Podiatry Dr. Golden.    Orders:  -     glimepiride (AMARYL) 1 MG tablet; Take 1 tablet by mouth Every Morning Before Breakfast.  Dispense: 90 tablet; Refill: 1    5. Essential hypertension  Assessment & Plan:  Blood pressure running at goal stable on atenolol/chlorthalidone 100/25 mg daily.  No refills needed.  Labs reviewed and up-to-date.  Continue to monitor      6. Mixed hyperlipidemia  Assessment & Plan:  Stable on atorvastatin 20 mg daily.  No refills needed.  Reviewed and up-to-date.      7. Obesity, morbid  Assessment & Plan:  Patient's (Body mass index is 54.32 kg/m².) indicates that they are morbidly/severely obese (BMI > 40 or > 35 with obesity -  related health condition) with health conditions that include obstructive sleep apnea, hypertension, diabetes mellitus and osteoarthritis . Weight is unchanged. BMI  is above average; BMI management plan is completed. We discussed portion control and increasing exercise.       8. Obstructive sleep apnea  Overview:  On CPAP religiously.               Follow Up   Return in about 3 months (around 8/5/2023).  Patient was given instructions and counseling regarding her condition or for health maintenance advice. Please see specific information pulled into the AVS if appropriate.

## 2023-05-05 NOTE — ASSESSMENT & PLAN NOTE
Patient's (Body mass index is 54.32 kg/m².) indicates that they are morbidly/severely obese (BMI > 40 or > 35 with obesity - related health condition) with health conditions that include obstructive sleep apnea, hypertension, diabetes mellitus and osteoarthritis . Weight is unchanged. BMI  is above average; BMI management plan is completed. We discussed portion control and increasing exercise.

## 2023-05-08 RX ORDER — DICLOFENAC SODIUM 75 MG/1
TABLET, DELAYED RELEASE ORAL
Qty: 180 TABLET | Refills: 3 | OUTPATIENT
Start: 2023-05-08

## 2023-05-12 DIAGNOSIS — E11.42 DIABETIC POLYNEUROPATHY ASSOCIATED WITH TYPE 2 DIABETES MELLITUS: ICD-10-CM

## 2023-05-12 RX ORDER — SOLIFENACIN SUCCINATE 5 MG/1
5 TABLET, FILM COATED ORAL DAILY
Qty: 90 TABLET | Refills: 1 | Status: SHIPPED | OUTPATIENT
Start: 2023-05-12

## 2023-05-12 RX ORDER — OMEPRAZOLE 20 MG/1
20 CAPSULE, DELAYED RELEASE ORAL DAILY
Qty: 90 CAPSULE | Refills: 1 | Status: SHIPPED | OUTPATIENT
Start: 2023-05-12

## 2023-05-12 RX ORDER — ATORVASTATIN CALCIUM 20 MG/1
20 TABLET, FILM COATED ORAL DAILY
Qty: 90 TABLET | Refills: 1 | Status: SHIPPED | OUTPATIENT
Start: 2023-05-12

## 2023-05-12 RX ORDER — GABAPENTIN 400 MG/1
400 CAPSULE ORAL
Qty: 90 CAPSULE | Refills: 0 | Status: SHIPPED | OUTPATIENT
Start: 2023-05-12

## 2023-05-14 DIAGNOSIS — M15.9 GENERALIZED OSTEOARTHRITIS: ICD-10-CM

## 2023-05-15 RX ORDER — DICLOFENAC SODIUM 75 MG/1
TABLET, DELAYED RELEASE ORAL
Qty: 200 TABLET | Refills: 2 | OUTPATIENT
Start: 2023-05-15

## 2023-05-15 RX ORDER — ATENOLOL AND CHLORTHALIDONE TABLET 100; 25 MG/1; MG/1
TABLET ORAL
Qty: 100 TABLET | Refills: 2 | OUTPATIENT
Start: 2023-05-15

## 2023-05-15 NOTE — TELEPHONE ENCOUNTER
Rx Refill Note  Requested Prescriptions     Pending Prescriptions Disp Refills   • traMADol (ULTRAM) 50 MG tablet [Pharmacy Med Name: traMADol HCl 50 MG Oral Tablet] 30 tablet      Sig: TAKE 1 TABLET BY MOUTH 3 TIMES  DAILY AS NEEDED FOR MODERATE  PAIN      Last office visit with prescribing clinician: Visit date not found       Next office visit with prescribing clinician: Visit date not found    Last filled 4/6/23  #30 no refills    Last UDS 5/5/23  POC Urine Drug Screen [facility] (05/05/2023 14:00)    Yenifer Ramirez LPN  05/15/23, 09:26 EDT

## 2023-05-16 RX ORDER — TRAMADOL HYDROCHLORIDE 50 MG/1
TABLET ORAL
Qty: 90 TABLET | Refills: 2 | Status: SHIPPED | OUTPATIENT
Start: 2023-05-16

## 2023-07-28 RX ORDER — DICLOFENAC SODIUM 75 MG/1
75 TABLET, DELAYED RELEASE ORAL 2 TIMES DAILY WITH MEALS
Qty: 180 TABLET | Refills: 1 | Status: SHIPPED | OUTPATIENT
Start: 2023-07-28

## 2023-07-28 RX ORDER — ATENOLOL AND CHLORTHALIDONE TABLET 100; 25 MG/1; MG/1
1 TABLET ORAL DAILY
Qty: 90 TABLET | Refills: 1 | Status: SHIPPED | OUTPATIENT
Start: 2023-07-28

## 2023-08-03 DIAGNOSIS — E11.42 TYPE 2 DIABETES MELLITUS WITH DIABETIC POLYNEUROPATHY, WITHOUT LONG-TERM CURRENT USE OF INSULIN: ICD-10-CM

## 2023-08-04 ENCOUNTER — OFFICE VISIT (OUTPATIENT)
Dept: FAMILY MEDICINE CLINIC | Age: 63
End: 2023-08-04
Payer: MEDICARE

## 2023-08-04 ENCOUNTER — LAB (OUTPATIENT)
Dept: LAB | Facility: HOSPITAL | Age: 63
End: 2023-08-04
Payer: MEDICARE

## 2023-08-04 VITALS
HEIGHT: 67 IN | BODY MASS INDEX: 45.99 KG/M2 | OXYGEN SATURATION: 97 % | DIASTOLIC BLOOD PRESSURE: 42 MMHG | SYSTOLIC BLOOD PRESSURE: 138 MMHG | HEART RATE: 57 BPM | WEIGHT: 293 LBS

## 2023-08-04 DIAGNOSIS — M15.9 GENERALIZED OSTEOARTHRITIS: Primary | ICD-10-CM

## 2023-08-04 DIAGNOSIS — E11.42 TYPE 2 DIABETES MELLITUS WITH DIABETIC POLYNEUROPATHY, WITHOUT LONG-TERM CURRENT USE OF INSULIN: ICD-10-CM

## 2023-08-04 DIAGNOSIS — E66.01 OBESITY, MORBID: ICD-10-CM

## 2023-08-04 DIAGNOSIS — G47.33 OBSTRUCTIVE SLEEP APNEA: ICD-10-CM

## 2023-08-04 DIAGNOSIS — Z79.899 HIGH RISK MEDICATION USE: ICD-10-CM

## 2023-08-04 DIAGNOSIS — F41.8 OTHER SPECIFIED ANXIETY DISORDERS: ICD-10-CM

## 2023-08-04 DIAGNOSIS — I10 ESSENTIAL HYPERTENSION: ICD-10-CM

## 2023-08-04 DIAGNOSIS — E78.2 MIXED HYPERLIPIDEMIA: ICD-10-CM

## 2023-08-04 DIAGNOSIS — E11.42 DIABETIC POLYNEUROPATHY ASSOCIATED WITH TYPE 2 DIABETES MELLITUS: ICD-10-CM

## 2023-08-04 PROBLEM — Z00.00 ANNUAL PHYSICAL EXAM: Status: RESOLVED | Noted: 2023-05-05 | Resolved: 2023-08-04

## 2023-08-04 LAB
ALBUMIN SERPL-MCNC: 3.7 G/DL (ref 3.5–5.2)
ALBUMIN/GLOB SERPL: 1.2 G/DL
ALP SERPL-CCNC: 89 U/L (ref 39–117)
ALT SERPL W P-5'-P-CCNC: 14 U/L (ref 1–33)
AMPHET+METHAMPHET UR QL: NEGATIVE
AMPHETAMINES UR QL: NEGATIVE
ANION GAP SERPL CALCULATED.3IONS-SCNC: 11.2 MMOL/L (ref 5–15)
AST SERPL-CCNC: 17 U/L (ref 1–32)
BARBITURATES UR QL SCN: NEGATIVE
BASOPHILS # BLD AUTO: 0.07 10*3/MM3 (ref 0–0.2)
BASOPHILS NFR BLD AUTO: 0.9 % (ref 0–1.5)
BENZODIAZ UR QL SCN: NEGATIVE
BILIRUB SERPL-MCNC: <0.2 MG/DL (ref 0–1.2)
BUN SERPL-MCNC: 20 MG/DL (ref 8–23)
BUN/CREAT SERPL: 18.5 (ref 7–25)
BUPRENORPHINE SERPL-MCNC: NEGATIVE NG/ML
CALCIUM SPEC-SCNC: 9.6 MG/DL (ref 8.6–10.5)
CANNABINOIDS SERPL QL: NEGATIVE
CHLORIDE SERPL-SCNC: 99 MMOL/L (ref 98–107)
CHOLEST SERPL-MCNC: 159 MG/DL (ref 0–200)
CO2 SERPL-SCNC: 27.8 MMOL/L (ref 22–29)
COCAINE UR QL: NEGATIVE
CREAT SERPL-MCNC: 1.08 MG/DL (ref 0.57–1)
DEPRECATED RDW RBC AUTO: 45.4 FL (ref 37–54)
EGFRCR SERPLBLD CKD-EPI 2021: 58.2 ML/MIN/1.73
EOSINOPHIL # BLD AUTO: 0.13 10*3/MM3 (ref 0–0.4)
EOSINOPHIL NFR BLD AUTO: 1.7 % (ref 0.3–6.2)
ERYTHROCYTE [DISTWIDTH] IN BLOOD BY AUTOMATED COUNT: 15.7 % (ref 12.3–15.4)
EXPIRATION DATE: NORMAL
GLOBULIN UR ELPH-MCNC: 3.1 GM/DL
GLUCOSE SERPL-MCNC: 93 MG/DL (ref 65–99)
HBA1C MFR BLD: 6.1 % (ref 4.8–5.6)
HCT VFR BLD AUTO: 33.8 % (ref 34–46.6)
HDLC SERPL-MCNC: 49 MG/DL (ref 40–60)
HGB BLD-MCNC: 10.7 G/DL (ref 12–15.9)
IMM GRANULOCYTES # BLD AUTO: 0.03 10*3/MM3 (ref 0–0.05)
IMM GRANULOCYTES NFR BLD AUTO: 0.4 % (ref 0–0.5)
LDLC SERPL CALC-MCNC: 84 MG/DL (ref 0–100)
LDLC/HDLC SERPL: 1.64 {RATIO}
LYMPHOCYTES # BLD AUTO: 1.1 10*3/MM3 (ref 0.7–3.1)
LYMPHOCYTES NFR BLD AUTO: 14.4 % (ref 19.6–45.3)
Lab: NORMAL
MCH RBC QN AUTO: 25.2 PG (ref 26.6–33)
MCHC RBC AUTO-ENTMCNC: 31.7 G/DL (ref 31.5–35.7)
MCV RBC AUTO: 79.7 FL (ref 79–97)
MDMA UR QL SCN: NEGATIVE
METHADONE UR QL SCN: NEGATIVE
MONOCYTES # BLD AUTO: 0.56 10*3/MM3 (ref 0.1–0.9)
MONOCYTES NFR BLD AUTO: 7.3 % (ref 5–12)
NEUTROPHILS NFR BLD AUTO: 5.76 10*3/MM3 (ref 1.7–7)
NEUTROPHILS NFR BLD AUTO: 75.3 % (ref 42.7–76)
NRBC BLD AUTO-RTO: 0 /100 WBC (ref 0–0.2)
OPIATES UR QL: NEGATIVE
OXYCODONE UR QL SCN: NEGATIVE
PCP UR QL SCN: NEGATIVE
PLATELET # BLD AUTO: 344 10*3/MM3 (ref 140–450)
PMV BLD AUTO: 10.4 FL (ref 6–12)
POTASSIUM SERPL-SCNC: 5.2 MMOL/L (ref 3.5–5.2)
PROT SERPL-MCNC: 6.8 G/DL (ref 6–8.5)
RBC # BLD AUTO: 4.24 10*6/MM3 (ref 3.77–5.28)
SODIUM SERPL-SCNC: 138 MMOL/L (ref 136–145)
TRIGL SERPL-MCNC: 149 MG/DL (ref 0–150)
VLDLC SERPL-MCNC: 26 MG/DL (ref 5–40)
WBC NRBC COR # BLD: 7.65 10*3/MM3 (ref 3.4–10.8)

## 2023-08-04 PROCEDURE — 85025 COMPLETE CBC W/AUTO DIFF WBC: CPT

## 2023-08-04 PROCEDURE — 80053 COMPREHEN METABOLIC PANEL: CPT

## 2023-08-04 PROCEDURE — 83036 HEMOGLOBIN GLYCOSYLATED A1C: CPT

## 2023-08-04 PROCEDURE — 80061 LIPID PANEL: CPT

## 2023-08-04 PROCEDURE — 36415 COLL VENOUS BLD VENIPUNCTURE: CPT

## 2023-08-04 RX ORDER — SOLIFENACIN SUCCINATE 5 MG/1
5 TABLET, FILM COATED ORAL DAILY
Qty: 100 TABLET | Refills: 2 | Status: SHIPPED | OUTPATIENT
Start: 2023-08-04

## 2023-08-04 RX ORDER — BUSPIRONE HYDROCHLORIDE 5 MG/1
5 TABLET ORAL 2 TIMES DAILY PRN
Qty: 60 TABLET | Refills: 5 | Status: SHIPPED | OUTPATIENT
Start: 2023-08-04

## 2023-08-04 RX ORDER — GLIMEPIRIDE 1 MG/1
TABLET ORAL
Qty: 100 TABLET | Refills: 2 | Status: SHIPPED | OUTPATIENT
Start: 2023-08-04

## 2023-08-04 RX ORDER — ATORVASTATIN CALCIUM 20 MG/1
20 TABLET, FILM COATED ORAL DAILY
Qty: 100 TABLET | Refills: 2 | Status: SHIPPED | OUTPATIENT
Start: 2023-08-04

## 2023-08-04 RX ORDER — OMEPRAZOLE 20 MG/1
20 CAPSULE, DELAYED RELEASE ORAL DAILY
Qty: 100 CAPSULE | Refills: 2 | Status: SHIPPED | OUTPATIENT
Start: 2023-08-04

## 2023-08-08 RX ORDER — GABAPENTIN 400 MG/1
400 CAPSULE ORAL
Qty: 90 CAPSULE | Refills: 0 | Status: SHIPPED | OUTPATIENT
Start: 2023-08-08

## 2023-08-15 ENCOUNTER — TELEPHONE (OUTPATIENT)
Dept: FAMILY MEDICINE CLINIC | Age: 63
End: 2023-08-15

## 2023-08-15 NOTE — TELEPHONE ENCOUNTER
Caller: Vivienne Sarahi YULISA    Relationship: Self    Best call back number: [phone]    What form or medical record are you requesting: PRESCRIPTION FOR DIABETIC SHOES. FORM TO FILL OUT AND FAX    Who is requesting this form or medical record from you: PATIENT    How would you like to receive the form or medical records (pick-up, mail, fax): FAX TO [employer]  If fax, what is the fax number: N/A  If mail, what is the address: N/A  If pick-up, provide patient with address and location details    Timeframe paperwork needed: ASAP    Additional notes: PATIENT WAS SEEN ON 08/04/2023. HAD A FORM TO BE FILLED OUT AND FAXED TO [employer] FOR DIABETIC SHOES. HAS NOT BEEN FITTED FOR THEM YET. NEEDS THIS FORM FILLED OUT SIGNED AND FAXED OVER TO [facility]. FORM HAS BEEN FILLED OUT BUT NOT FAXED. PLEASE FAX [facility]  INSURANCE COMPANY CANNOT MOVE FORWARD UNTIL THIS IS DONE. PLEASE FAX FORM TO [facility].        [facility] - [place], [place] - [address] [facility] [address]  - [phone]  - [phone]  [phone]

## 2023-08-15 NOTE — TELEPHONE ENCOUNTER
Happy to fill this out.  I have not yet received the form.  Can we get this from the pharmacy?  Thanks, KATIANA

## 2023-08-16 NOTE — TELEPHONE ENCOUNTER
Fabienne Anders is a 23 year old female presenting for medication check to discuss anxiety.    Denies known Latex allergy or symptoms of Latex sensitivity.  Medications reviewed.  Health maintenance reviewed and is due for Tdap.  Requests Tdap.       Form filled out and placed in providers box for signature.

## 2023-09-08 ENCOUNTER — HOSPITAL ENCOUNTER (OUTPATIENT)
Dept: MAMMOGRAPHY | Facility: HOSPITAL | Age: 63
Discharge: HOME | End: 2023-09-08
Admitting: FAMILY MEDICINE
Payer: MEDICARE

## 2023-09-08 DIAGNOSIS — Z12.31 ENCOUNTER FOR SCREENING MAMMOGRAM FOR BREAST CANCER: ICD-10-CM

## 2023-09-08 PROCEDURE — 77063 BREAST TOMOSYNTHESIS BI: CPT

## 2023-09-08 PROCEDURE — 77067 SCR MAMMO BI INCL CAD: CPT

## 2023-09-18 DIAGNOSIS — E11.42 DIABETIC POLYNEUROPATHY ASSOCIATED WITH TYPE 2 DIABETES MELLITUS: ICD-10-CM

## 2023-09-18 DIAGNOSIS — M15.9 GENERALIZED OSTEOARTHRITIS: ICD-10-CM

## 2023-09-18 RX ORDER — GABAPENTIN 400 MG/1
400 CAPSULE ORAL
Qty: 90 CAPSULE | Refills: 0 | OUTPATIENT
Start: 2023-09-18

## 2023-09-19 RX ORDER — TRAMADOL HYDROCHLORIDE 50 MG/1
50 TABLET ORAL EVERY 8 HOURS PRN
Qty: 90 TABLET | Refills: 2 | Status: SHIPPED | OUTPATIENT
Start: 2023-09-19

## 2023-10-05 ENCOUNTER — OFFICE VISIT (OUTPATIENT)
Dept: PODIATRY | Facility: CLINIC | Age: 63
End: 2023-10-05
Payer: MEDICARE

## 2023-10-05 VITALS
BODY MASS INDEX: 45.99 KG/M2 | OXYGEN SATURATION: 99 % | WEIGHT: 293 LBS | SYSTOLIC BLOOD PRESSURE: 146 MMHG | HEIGHT: 67 IN | HEART RATE: 77 BPM | DIASTOLIC BLOOD PRESSURE: 83 MMHG

## 2023-10-05 DIAGNOSIS — G62.9 NEUROPATHY: ICD-10-CM

## 2023-10-05 DIAGNOSIS — E11.9 NON-INSULIN DEPENDENT TYPE 2 DIABETES MELLITUS: ICD-10-CM

## 2023-10-05 DIAGNOSIS — E11.8 DIABETIC FOOT: ICD-10-CM

## 2023-10-05 DIAGNOSIS — E66.01 MORBID OBESITY: Primary | ICD-10-CM

## 2023-10-05 DIAGNOSIS — M79.671 FOOT PAIN, BILATERAL: ICD-10-CM

## 2023-10-05 DIAGNOSIS — M79.672 FOOT PAIN, BILATERAL: ICD-10-CM

## 2023-10-05 NOTE — PROGRESS NOTES
[facility] - PODIATRY    Today's Date: 10/05/23    Patient Name: Sarahi Palafox  MRN: [id number]  CSN: [id number]  PCP: Kia Juarez MD, Last PCP Visit:  8/4/2023  Referring Provider: No ref. provider found    SUBJECTIVE     Chief Complaint   Patient presents with    Left Foot - Follow-up, Nail Problem, Diabetes    Right Foot - Follow-up, Nail Problem, Diabetes     HPI: Sarahi Palafox, a 63 y.o.female, presents to clinic for painful toenail and a diabetic foot evaluation.    New, Established, New Problem:  New  Location:  Toenails  Duration:   Greater than five years  Onset:  Gradual  Nature:  sore with palpation.  Stable, worsening, improving:   worsening  Aggravating factors:  Pain with shoe gear and ambulation.  Previous Treatment: Unable to trim their own toenails.    Patient controlling diabetes via: Oral medication    Patient states their last blood glucose:  unsure    Patient denies any fevers, chills, nausea, vomiting, shortness of breath, nor any other constitutional signs nor symptoms.    Patient has numbness throughout her feet.    No recent medical changes.    Past Medical History:   Diagnosis Date    Allergic 1980    Arthritis 2000    CTS (carpal tunnel syndrome) 2023    Dental disease 07/2014    I'm in the process of getting dentures    Diabetes mellitus 2012    Difficulty walking 2021    Eye drainage     left eye    Gastroesophageal reflux disease without esophagitis     Hyperlipidemia 2018    Hypertension 2010    Ingrown toenail     Knee swelling 2009    Nasal cavity mass     Nasal polyp     Neuropathy     feet and hands    Neuropathy in diabetes Not sure but has gotten worse in the last 6 months    Nosebleed 02/2021    I haven't had one in a while    Osteopenia     Periarthritis of shoulder 2015    PONV (postoperative nausea and vomiting)     Rotator cuff syndrome 2015    Shin splints     Sleep apnea 12/2014    I use a CPAP machine since January 2015     Past Surgical  History:   Procedure Laterality Date     SECTION  ,  and     ENDOSCOPIC FUNCTIONAL SINUS SURGERY (FESS) Left 10/04/2021    Procedure: ENDOSCOPIC FUNCTIONAL SINUS SURGERY with excision of left nasal cavity mass;  Surgeon: Alberto Tavera MD;  Location: [facility] MAIN OR;  Service: ENT;  Laterality: Left;    KNEE SURGERY Left     TUBAL ABDOMINAL LIGATION       Family History   Problem Relation Age of Onset    Alcohol abuse Father     COPD Father     Hypertension Father     Heart disease Father     Arthritis Mother     Diabetes Mother     Hypertension Mother     Osteoporosis Mother     Asthma Daughter      Social History     Socioeconomic History    Marital status:    Tobacco Use    Smoking status: Never    Smokeless tobacco: Never   Vaping Use    Vaping Use: Never used   Substance and Sexual Activity    Alcohol use: Not Currently     Comment: I haven't drank for at least 20 years.    Drug use: Never    Sexual activity: Not Currently     Partners: Male     Birth control/protection: None     Allergies   Allergen Reactions    Penicillins Rash     Current Outpatient Medications   Medication Sig Dispense Refill    atenolol-chlorthalidone (TENORETIC) 100-25 MG per tablet Take 1 tablet by mouth Daily. 90 tablet 1    atorvastatin (LIPITOR) 20 MG tablet TAKE 1 TABLET BY MOUTH DAILY 100 tablet 2    busPIRone (BUSPAR) 5 MG tablet Take 1 tablet by mouth 2 (Two) Times a Day As Needed (anxiety). 60 tablet 5    diclofenac (VOLTAREN) 75 MG EC tablet Take 1 tablet by mouth 2 (Two) Times a Day With Meals. 180 tablet 1    fluticasone (FLONASE) 50 MCG/ACT nasal spray 1 spray by Each Nare route Daily. 16 mL 5    gabapentin (NEURONTIN) 400 MG capsule Take 1 capsule by mouth every night at bedtime. 90 capsule 0    gemfibrozil (LOPID) 600 MG tablet Take 1 tablet by mouth Daily.      glimepiride (AMARYL) 1 MG tablet TAKE 1 TABLET BY MOUTH IN THE  MORNING BEFORE BREAKFAST 100 tablet 2    loratadine (CLARITIN) 10  MG tablet Take 1 tablet by mouth Daily.      metFORMIN (GLUCOPHAGE) 1000 MG tablet Take 1 tablet by mouth 2 (Two) Times a Day. 180 tablet 1    omeprazole (priLOSEC) 20 MG capsule TAKE 1 CAPSULE BY MOUTH DAILY 100 capsule 2    silver sulfadiazine (SSD) 1 % cream Apply  topically to the appropriate area as directed See Admin Instructions. Apply topically to the affected areas twice daily as directed 75 g 2    solifenacin (VESICARE) 5 MG tablet TAKE 1 TABLET BY MOUTH DAILY 100 tablet 2    traMADol (ULTRAM) 50 MG tablet Take 1 tablet by mouth Every 8 (Eight) Hours As Needed for Moderate Pain. 90 tablet 2    Vitamin D, Cholecalciferol, 50 MCG (2000 UT) capsule Take 1 capsule by mouth Daily.       No current facility-administered medications for this visit.     Review of Systems   Constitutional: Negative.    Skin:         Painful toenails   Neurological:  Positive for numbness.   All other systems reviewed and are negative.    OBJECTIVE     Vitals:    10/05/23 0805   BP: 146/83   Pulse: 77   SpO2: 99%       Body mass index is 56.38 kg/m².    Lab Results   Component Value Date    HGBA1C 6.10 (H) 2023       Lab Results   Component Value Date    GLUCOSE 93 2023    CALCIUM 9.6 2023     2023    K 5.2 2023    CO2 27.8 2023    CL 99 2023    BUN 20 2023    CREATININE 1.08 (H) 2023    EGFRIFNONA 52 (L) 2021    BCR 18.5 2023    ANIONGAP 11.2 2023       Patient seen in no apparent distress.      PHYSICAL EXAM:     Foot/Ankle Exam    GENERAL  Diabetic foot exam performed    Appearance:  (Morbid obesity)  Orientation:  AAOx3  Affect:  appropriate  Gait:  unimpaired  Assistance:  independent  Right shoe gear: casual shoe  Left shoe gear: casual shoe    VASCULAR     Right Foot Vascularity   Dorsalis pedis:  1+  Posterior tibial:  1+  Skin temperature:  cool  Edema gradin+ and non-pitting  CFT:  < 3 seconds  Pedal hair growth:  Absent  Varicosities:  mild  varicosities     Left Foot Vascularity   Dorsalis pedis:  1+  Posterior tibial:  1+  Skin temperature:  cool  Edema gradin+ and non-pitting  CFT:  < 3 seconds  Pedal hair growth:  Absent  Varicosities:  mild varicosities     NEUROLOGIC     Right Foot Neurologic   Light touch sensation: absent  Vibratory sensation: absent  Hot/Cold sensation: absent     Left Foot Neurologic   Light touch sensation: absent  Vibratory sensation: absent  Hot/Cold sensation:  absent    MUSCLE STRENGTH     Right Foot Muscle Strength   Foot dorsiflexion:  4  Foot plantar flexion:  4  Foot inversion:  4  Foot eversion:  4     Left Foot Muscle Strength   Foot dorsiflexion:  4  Foot plantar flexion:  4  Foot inversion:  4  Foot eversion:  4    RANGE OF MOTION     Right Foot Range of Motion   Foot and ankle ROM within normal limits       Left Foot Range of Motion   Foot and ankle ROM within normal limits      DERMATOLOGIC      Right Foot Dermatologic   Skin  Right foot skin is intact.   Nails  1.  Positive for elongated, onychomycosis, abnormal thickness, subungual debris and ingrown toenail.  2.  Positive for elongated, onychomycosis, abnormal thickness, subungual debris and ingrown toenail.  3.  Positive for elongated, onychomycosis, abnormal thickness, subungual debris and ingrown toenail.  4.  Positive for elongated, onychomycosis, abnormal thickness, subungual debris and ingrown toenail.  5.  Positive for elongated, onychomycosis, abnormal thickness, subungual debris and ingrown toenail.  Nails comment:  Toenails 1, 2, 3, 4, and 5     Left Foot Dermatologic   Skin  Left foot skin is intact.   Nails comment:  Toenails 1, 2, 3, 4, and 5  Nails  1.  Positive for elongated, onychomycosis, abnormal thickness, subungual debris and ingrown toenail.  2.  Positive for elongated, onychomycosis, abnormal thickness, subungual debris and ingrown toenail.  3.  Positive for elongated, onychomycosis, abnormal thickness, subungual debris and ingrown  toenail.  4.  Positive for elongated, onychomycosis, abnormally thick, subungual debris and ingrown toenail.  5.  Positive for elongated, onychomycosis, abnormally thick, subungual debris and ingrown toenail.    Diabetic Foot Exam Performed and Monofilament Test Performed    ASSESSMENT/PLAN     Diagnoses and all orders for this visit:    1. Morbid obesity (Primary)    2. Non-insulin dependent type 2 diabetes mellitus    3. Diabetic foot    4. Neuropathy    5. Foot pain, bilateral    Comprehensive lower extremity examination and evaluation was performed.    Discussed findings and treatment plan including risks, benefits, and treatment options with patient in detail. Patient agreed with treatment plan.    Medications and allergies reviewed.  Reviewed available blood glucose and HgB A1C lab values along with other pertinent labs.  These were discussed with the patient as to their importance of diabetic maintenance.    Toenails 1, 2, 3, 4, 5 on Right and 1, 2, 3, 4, 5 on Left were debrided with nail nippers then filed with a Dremel nail candi.  Patient tolerated procedure well without complications.    Diabetic foot exam performed and documented this date, compliant with CQM required standards. Detail of findings as noted in physical exam.  Lower extremity Neurologic exam for diabetic patient performed and documented this date, compliant with PQRS required standards. Detail of findings as noted in physical exam.  Advised patient importance of good routine lower extremity hygiene. Advised patient importance of evaluating for intact skin and pain free nail borders.  Advised patient to use mirror to evaluate plantar/ soles of feet for better visualization. Advised patient monitor and phone office to be seen if any cracking to skin, open lesions, painful nail borders or if nails become elongated prior to next visit. Advised patient importance of daily cleansing of lower extremities, followed by good skin cream to maintain  normal hydration of skin. Also advised patient importance of close daily monitoring of blood sugar. Advised to regulate diet and medications to maintain control of blood sugar in optimal range. Contact primary care provider if difficulties maintaining blood sugar levels.  Advised Patient of presence of Diabetes Mellitus condition.  Advised Patient risk of progression and worsening or improvement, then return of condition.  Will monitor condition for any change in future. Treat with most appropriate treatment pending status of condition.  Counseled and advised patient extensively on nature and ramifications of diabetes. Standard instructions given to patient for good diabetic foot care and maintenance. Advised importance of careful monitoring to avoid break down and complications secondary to diabetes. Advised patient importance of strict maintenance of blood sugar control. Advised patient of possible ominous results from neglect of condition, i.e.: amputation/ loss of digits, feet and legs, or even death.  Patient states understands counseling, will monitor closely, continue good hygiene and routine diabetic foot care. Patient will contact office should they have any questions or problems.      An After Visit Summary was printed and given to the patient at discharge, including (if requested) any available informative/educational handouts regarding diagnosis, treatment, or medications. All questions were answered to patient/family satisfaction. Should symptoms fail to improve or worsen they agree to call or return to clinic or to go to the Emergency Department. Discussed the importance of following up with any needed screening tests/labs/specialist appointments and any requested follow-up recommended by me today. Importance of maintaining follow-up discussed and patient accepts that missed appointments can delay diagnosis and potentially lead to worsening of conditions.    Return in about 9 weeks (around 12/7/2023)  for Toenail Care., or sooner if acute issues arise.    This document has been electronically signed by Flavio Golden DPM on October 5, 2023 08:18 EDT

## 2023-10-13 ENCOUNTER — CLINICAL SUPPORT (OUTPATIENT)
Dept: ORTHOPEDIC SURGERY | Facility: CLINIC | Age: 63
End: 2023-10-13
Payer: MEDICARE

## 2023-10-13 DIAGNOSIS — G56.02 SEVERE CARPAL TUNNEL SYNDROME OF LEFT WRIST: Primary | ICD-10-CM

## 2023-10-13 DIAGNOSIS — G56.01 SEVERE CARPAL TUNNEL SYNDROME OF RIGHT WRIST: ICD-10-CM

## 2023-10-13 RX ORDER — METHYLPREDNISOLONE ACETATE 80 MG/ML
80 INJECTION, SUSPENSION INTRA-ARTICULAR; INTRALESIONAL; INTRAMUSCULAR; SOFT TISSUE
Status: COMPLETED | OUTPATIENT
Start: 2023-10-13 | End: 2023-10-13

## 2023-10-13 RX ORDER — LIDOCAINE HYDROCHLORIDE 10 MG/ML
0.5 INJECTION, SOLUTION EPIDURAL; INFILTRATION; INTRACAUDAL; PERINEURAL
Status: COMPLETED | OUTPATIENT
Start: 2023-10-13 | End: 2023-10-13

## 2023-10-13 RX ADMIN — METHYLPREDNISOLONE ACETATE 80 MG: 80 INJECTION, SUSPENSION INTRA-ARTICULAR; INTRALESIONAL; INTRAMUSCULAR; SOFT TISSUE at 11:12

## 2023-10-13 RX ADMIN — LIDOCAINE HYDROCHLORIDE 0.5 ML: 10 INJECTION, SOLUTION EPIDURAL; INFILTRATION; INTRACAUDAL; PERINEURAL at 11:12

## 2023-10-13 NOTE — PROGRESS NOTES
Chief Complaint  Follow-up of the Right Wrist and Follow-up of the Left Wrist    Subjective    History of Present Illness      Sarahi Palafox is a 63 y.o. female who presents to [facility] for injection therapy. She receives  BILATERAL wrist injections of Depo-Medrol. Since last injection, patient notes moderate relief of symptoms for about 1 month. Treatment options have been discussed and she understands and consents.       Objective   Vital Signs:   There were no vitals taken for this visit.    Physical Exam  63 y.o. female is awake, alert, oriented, in no acute distress and well developed, well nourished.  Ortho Exam   BILATERAL wrist  She is right hand dominant.   Positive for mild swelling over the volar aspect of the wrist.   Negative Tinel's sign at the wrist.        Negative Phalen's sign at the wrist .   strength is impaired.   Radial pulse is palpable. Capillary refill is 2 seconds with a brisk return.   There is wasting of the thenar eminence.  Sensation to light touch and pinprick are diminished over the thumb, index and middle fingers.  Flexor and extensor tendon functions are well preserved.   Moving 2 point discrimination is prolonged over the median nerve distribution.  There is no clinical evidence of renal disease, thyroid disease. She is diabetic.  No evidence of RSD.         Result Review :        PROCEDURE  Medium Joint Arthrocentesis  Date/Time: 10/13/2023 11:12 AM  Consent given by: patient  Site marked: site marked  Timeout: Immediately prior to procedure a time out was called to verify the correct patient, procedure, equipment, support staff and site/side marked as required   Supporting Documentation  Indications: pain   Procedure Details  Location: wrist - Wrist joint: Right carpal tunnel.  Preparation: Patient was prepped and draped in the usual sterile fashion  Needle gauge: 27g.  Approach: volar  Medications administered: 80 mg methylPREDNISolone acetate  80 MG/ML; 0.5 mL lidocaine PF 1% 1 %  Patient tolerance: patient tolerated the procedure well with no immediate complications      Medium Joint Arthrocentesis  Date/Time: 10/13/2023 11:12 AM  Consent given by: patient  Site marked: site marked  Timeout: Immediately prior to procedure a time out was called to verify the correct patient, procedure, equipment, support staff and site/side marked as required   Supporting Documentation  Indications: pain   Procedure Details  Location: wrist - Wrist joint: Left carpal tunnel.  Preparation: Patient was prepped and draped in the usual sterile fashion  Needle gauge: 27g.  Approach: volar  Medications administered: 80 mg methylPREDNISolone acetate 80 MG/ML; 0.5 mL lidocaine PF 1% 1 %  Patient tolerance: patient tolerated the procedure well with no immediate complications             Injection site was identified by physical examination and cleaned with Betadine and alcohol swabs. Prior to needle insertion, ethyl chloride spray was used for surface anesthesia. Sterile technique was used.       Assessment   Assessment and Plan    Problem List Items Addressed This Visit          Neuro    Severe carpal tunnel syndrome of right wrist    Relevant Orders    Medium Joint Arthrocentesis: R radiocarpal    Severe carpal tunnel syndrome of left wrist - Primary    Relevant Orders    Medium Joint Arthrocentesis: L radiocarpal         Follow Up   Bilateral wrist Depo-Medrol injection was discussed with the patient. Discussed indication, risks, benefits, and alternatives. Verbal consent was given to proceed with the procedure.   Injection was performed from volar approach.  Patient tolerated the procedure well and no complications were encountered.  Discussion of orthopedic goals and activities and patient/guardian expressed understanding.  Ice, heat, rest, compression and elevation of extremity as beneficial  nsaids and/or tylenol as beneficial  Instructed to refrain from heavy activity/rest  the extremity for the next 24-48 hours  Discussion regarding possibility of cortisol flare and what to expect if this occurs  Watch for signs and symptoms of infection  Call if adverse effect from injection therapy  Follow up in 3 months  Patient was given instructions and counseling regarding her condition or for health maintenance advice. Please see specific information pulled into the AVS if appropriate.     Daniel Goodwin PA-C   Date of Encounter: 10/13/2023   Electronically signed by Daniel Goodwin PA-C, 10/13/23, 1:23 PM EDT.      EMR Dragon/Transcription disclaimer:  Much of this encounter note is an electronic transcription/translation of spoken language to printed text. The electronic translation of spoken language may permit erroneous, or at times, nonsensical words or phrases to be inadvertently transcribed; Although I have reviewed the note for such errors, some may still exist.

## 2023-10-28 DIAGNOSIS — E11.42 DIABETIC POLYNEUROPATHY ASSOCIATED WITH TYPE 2 DIABETES MELLITUS: ICD-10-CM

## 2023-10-30 RX ORDER — ATENOLOL AND CHLORTHALIDONE TABLET 100; 25 MG/1; MG/1
1 TABLET ORAL DAILY
Qty: 100 TABLET | Refills: 0 | Status: SHIPPED | OUTPATIENT
Start: 2023-10-30

## 2023-10-30 RX ORDER — DICLOFENAC SODIUM 75 MG/1
75 TABLET, DELAYED RELEASE ORAL 2 TIMES DAILY WITH MEALS
Qty: 200 TABLET | Refills: 0 | Status: SHIPPED | OUTPATIENT
Start: 2023-10-30

## 2023-10-31 RX ORDER — GABAPENTIN 400 MG/1
400 CAPSULE ORAL
Qty: 90 CAPSULE | Refills: 2 | Status: SHIPPED | OUTPATIENT
Start: 2023-10-31

## 2023-11-10 ENCOUNTER — OFFICE VISIT (OUTPATIENT)
Dept: FAMILY MEDICINE CLINIC | Age: 63
End: 2023-11-10
Payer: MEDICARE

## 2023-11-10 VITALS
BODY MASS INDEX: 56.37 KG/M2 | HEIGHT: 67 IN | HEART RATE: 64 BPM | DIASTOLIC BLOOD PRESSURE: 73 MMHG | SYSTOLIC BLOOD PRESSURE: 142 MMHG | OXYGEN SATURATION: 100 %

## 2023-11-10 DIAGNOSIS — Z79.899 HIGH RISK MEDICATION USE: ICD-10-CM

## 2023-11-10 DIAGNOSIS — K21.9 GASTROESOPHAGEAL REFLUX DISEASE WITHOUT ESOPHAGITIS: ICD-10-CM

## 2023-11-10 DIAGNOSIS — I10 ESSENTIAL HYPERTENSION: ICD-10-CM

## 2023-11-10 DIAGNOSIS — G47.33 OBSTRUCTIVE SLEEP APNEA: ICD-10-CM

## 2023-11-10 DIAGNOSIS — E78.2 MIXED HYPERLIPIDEMIA: ICD-10-CM

## 2023-11-10 DIAGNOSIS — M15.9 GENERALIZED OSTEOARTHRITIS: Primary | ICD-10-CM

## 2023-11-10 DIAGNOSIS — E11.42 TYPE 2 DIABETES MELLITUS WITH DIABETIC POLYNEUROPATHY, WITHOUT LONG-TERM CURRENT USE OF INSULIN: ICD-10-CM

## 2023-11-10 PROBLEM — F41.8 OTHER SPECIFIED ANXIETY DISORDERS: Status: RESOLVED | Noted: 2023-08-04 | Resolved: 2023-11-10

## 2023-11-10 PROCEDURE — 3077F SYST BP >= 140 MM HG: CPT | Performed by: FAMILY MEDICINE

## 2023-11-10 PROCEDURE — 3078F DIAST BP <80 MM HG: CPT | Performed by: FAMILY MEDICINE

## 2023-11-10 PROCEDURE — 1160F RVW MEDS BY RX/DR IN RCRD: CPT | Performed by: FAMILY MEDICINE

## 2023-11-10 PROCEDURE — 3044F HG A1C LEVEL LT 7.0%: CPT | Performed by: FAMILY MEDICINE

## 2023-11-10 PROCEDURE — 99214 OFFICE O/P EST MOD 30 MIN: CPT | Performed by: FAMILY MEDICINE

## 2023-11-10 PROCEDURE — 1159F MED LIST DOCD IN RCRD: CPT | Performed by: FAMILY MEDICINE

## 2023-11-10 PROCEDURE — 80305 DRUG TEST PRSMV DIR OPT OBS: CPT | Performed by: FAMILY MEDICINE

## 2023-11-10 NOTE — ASSESSMENT & PLAN NOTE
Stable on atorvastatin 20 mg daily.  Refills were not needed today.  Labs were not due today.  Continue to monitor.

## 2023-11-10 NOTE — ASSESSMENT & PLAN NOTE
Stable on omeprazole 20 mg daily.  Refills were not needed today.  Continue to monitor.  Wean PPI as able.

## 2023-11-10 NOTE — ASSESSMENT & PLAN NOTE
She is on metformin and glimepiride for diabetes.   No refills needed.  She is UTD on eye exam (4/2023), done at [facility].  She is up-to-date on foot exam (4/2023).  She is on a statin.  She is on gabapentin for diabetic peripheral neuropathy.  Pain is fairly well controlled.  Denies adverse effects. Follows with Podiatry Dr. Golden.  Labs are reviewed and up-to-date.

## 2023-11-10 NOTE — PROGRESS NOTES
Chief Complaint  Osteoarthritis (3 month f/u)    Subjective          Sarahi Palafox presents to [facility] today for follow-up of routine problems.  She is accompanied today by her  Dao.    She is on tramadol 3 times daily and diclofenac for diffuse arthritis, worst in the lateral shoulders and knees.  Denies adverse effects.  She ambulates using a cane.  She is following with Daniel Goodwin and [facility].  She is not a good surgical candidate.  Synvisc did not provide any significant relief.     She is on metformin and glimepiride for diabetes.   She is UTD on eye exam (4/2023), done at [place].  She is up-to-date on foot exam (4/2023).  She is on a statin.  She is on gabapentin for diabetic peripheral neuropathy.  Pain is fairly well controlled.  Denies adverse effects. Follows with Podiatry Dr. Golden.     She is on atenolol/chlorthalidone for HTN.  Her blood pressure has been well controlled.  She does usually have some white coat HTN.  Denies chest pain, shortness of breath or palpitations.     She is on atorvastatin for hyperlipidemia.  Denies myalgias.      She is on Vesicare for urge incontinence.  Urinary symptoms well controlled.     She is on omeprazole for GERD.  Denies reflux or heartburn.     She uses her CPAP religiously every night for sleep apnea.      Current Outpatient Medications:     atenolol-chlorthalidone (TENORETIC) 100-25 MG per tablet, TAKE 1 TABLET BY MOUTH DAILY, Disp: 100 tablet, Rfl: 0    atorvastatin (LIPITOR) 20 MG tablet, TAKE 1 TABLET BY MOUTH DAILY, Disp: 100 tablet, Rfl: 2    busPIRone (BUSPAR) 5 MG tablet, Take 1 tablet by mouth 2 (Two) Times a Day As Needed (anxiety)., Disp: 60 tablet, Rfl: 5    diclofenac (VOLTAREN) 75 MG EC tablet, TAKE 1 TABLET BY MOUTH TWICE  DAILY WITH MEALS, Disp: 200 tablet, Rfl: 0    fluticasone (FLONASE) 50 MCG/ACT nasal spray, 1 spray by Each Nare route Daily., Disp: 16 mL, Rfl: 5    gabapentin  "(NEURONTIN) 400 MG capsule, TAKE 1 CAPSULE BY MOUTH EVERY  NIGHT AT BEDTIME, Disp: 90 capsule, Rfl: 2    gemfibrozil (LOPID) 600 MG tablet, Take 1 tablet by mouth Daily., Disp: , Rfl:     glimepiride (AMARYL) 1 MG tablet, TAKE 1 TABLET BY MOUTH IN THE  MORNING BEFORE BREAKFAST, Disp: 100 tablet, Rfl: 2    loratadine (CLARITIN) 10 MG tablet, Take 1 tablet by mouth Daily., Disp: , Rfl:     metFORMIN (GLUCOPHAGE) 1000 MG tablet, TAKE 1 TABLET BY MOUTH TWICE  DAILY, Disp: 200 tablet, Rfl: 0    omeprazole (priLOSEC) 20 MG capsule, TAKE 1 CAPSULE BY MOUTH DAILY, Disp: 100 capsule, Rfl: 2    silver sulfadiazine (SSD) 1 % cream, Apply  topically to the appropriate area as directed See Admin Instructions. Apply topically to the affected areas twice daily as directed, Disp: 75 g, Rfl: 2    solifenacin (VESICARE) 5 MG tablet, TAKE 1 TABLET BY MOUTH DAILY, Disp: 100 tablet, Rfl: 2    traMADol (ULTRAM) 50 MG tablet, Take 1 tablet by mouth Every 8 (Eight) Hours As Needed for Moderate Pain., Disp: 90 tablet, Rfl: 2    Vitamin D, Cholecalciferol, 50 MCG (2000 UT) capsule, Take 1 capsule by mouth Daily., Disp: , Rfl:     Allergies:  Penicillins      Objective   Vital Signs:   Vitals:    11/10/23 1409 11/10/23 1455   BP: 143/75 142/73   BP Location: Right arm Right arm   Patient Position: Sitting Sitting   Cuff Size: Adult Adult   Pulse: 66 64   SpO2: 100%    Height: 170.2 cm (67.01\")        Physical Exam  Vitals reviewed.   Constitutional:       General: She is not in acute distress.     Appearance: Normal appearance. She is well-developed.   HENT:      Head: Normocephalic and atraumatic.      Right Ear: External ear normal.      Left Ear: External ear normal.      Nose: Nose normal.      Mouth/Throat:      Mouth: Mucous membranes are moist.   Eyes:      Extraocular Movements: Extraocular movements intact.      Conjunctiva/sclera: Conjunctivae normal.      Pupils: Pupils are equal, round, and reactive to light.   Cardiovascular:     "  Rate and Rhythm: Normal rate and regular rhythm.      Heart sounds: No murmur heard.  Pulmonary:      Effort: Pulmonary effort is normal.      Breath sounds: Normal breath sounds. No wheezing, rhonchi or rales.   Abdominal:      General: Bowel sounds are normal. There is no distension.      Palpations: Abdomen is soft.      Tenderness: There is no abdominal tenderness.   Musculoskeletal:         General: Normal range of motion.   Neurological:      Mental Status: She is alert.   Psychiatric:         Mood and Affect: Mood and affect normal.         Behavior: Behavior normal.         Thought Content: Thought content normal.         Judgment: Judgment normal.        Lab Results   Component Value Date    GLUCOSE 93 08/04/2023    BUN 20 08/04/2023    CREATININE 1.08 (H) 08/04/2023    EGFRIFNONA 52 (L) 11/19/2021    BCR 18.5 08/04/2023    K 5.2 08/04/2023    CO2 27.8 08/04/2023    CALCIUM 9.6 08/04/2023    ALBUMIN 3.7 08/04/2023    LABIL2 1.2 (L) 04/02/2021    AST 17 08/04/2023    ALT 14 08/04/2023     Lab Results   Component Value Date    HGBA1C 6.10 (H) 08/04/2023     Lab Results   Component Value Date    CHOL 159 08/04/2023    CHLPL 172 04/02/2021    TRIG 149 08/04/2023    HDL 49 08/04/2023    LDL 84 08/04/2023          Assessment and Plan    Diagnoses and all orders for this visit:    1. Generalized osteoarthritis (Primary)  Assessment & Plan:  Stable on current regimen.  Symptoms are well controlled.  No adverse effects. She does require ongoing use of this controlled substance to function.  Tox screen was due today.  Prior tox screen appropriate.  BRENT was run today.  Refills were not needed today.  RTC 3 months.        2. High risk medication use  -     POC Urine Drug Screen [facility]    3. Type 2 diabetes mellitus with diabetic polyneuropathy, without long-term current use of insulin  Assessment & Plan:  She is on metformin and glimepiride for diabetes.   No refills needed.  She is UTD on eye exam  (4/2023), done at [place].  She is up-to-date on foot exam (4/2023).  She is on a statin.  She is on gabapentin for diabetic peripheral neuropathy.  Pain is fairly well controlled.  Denies adverse effects. Follows with Podiatry Dr. Golden.  Labs are reviewed and up-to-date.      4. Essential hypertension  Assessment & Plan:  Blood pressure has been running at goal.  Continue atenolol/chlorthalidone 100/25 mg daily.  Refills were not needed today.  Labs were not needed today.  Continue to monitor.          5. Mixed hyperlipidemia  Assessment & Plan:  Stable on atorvastatin 20 mg daily.  Refills were not needed today.  Labs were not due today.  Continue to monitor.        6. Gastroesophageal reflux disease without esophagitis  Assessment & Plan:  Stable on omeprazole 20 mg daily.  Refills were not needed today.  Continue to monitor.  Wean PPI as able.        7. Obstructive sleep apnea  Overview:  On CPAP religiously.              Follow Up   Return in about 3 months (around 2/10/2024) for Recheck.  Patient was given instructions and counseling regarding her condition or for health maintenance advice. Please see specific information pulled into the AVS if appropriate.     Answers for HPI/ROS submitted by the patient on 8/6/2021  Please describe your symptoms.: Follow-up appointment  Have you had these symptoms before?: No  How long have you been having these symptoms?: Greater than 2 weeks  Please list any medications you are currently taking for this condition.: 0  Please describe any probable cause for these symptoms. : 0  What is the primary reason for your visit?: Other

## 2023-11-10 NOTE — ASSESSMENT & PLAN NOTE
Blood pressure has been running at goal.  Continue atenolol/chlorthalidone 100/25 mg daily.  Refills were not needed today.  Labs were not needed today.  Continue to monitor.

## 2023-12-01 RX ORDER — DICLOFENAC SODIUM 75 MG/1
75 TABLET, DELAYED RELEASE ORAL 2 TIMES DAILY WITH MEALS
Qty: 200 TABLET | Refills: 1 | Status: SHIPPED | OUTPATIENT
Start: 2023-12-01

## 2023-12-01 RX ORDER — ATENOLOL AND CHLORTHALIDONE TABLET 100; 25 MG/1; MG/1
1 TABLET ORAL DAILY
Qty: 100 TABLET | Refills: 1 | Status: SHIPPED | OUTPATIENT
Start: 2023-12-01

## 2024-02-02 DIAGNOSIS — M15.9 GENERALIZED OSTEOARTHRITIS: ICD-10-CM

## 2024-02-06 RX ORDER — TRAMADOL HYDROCHLORIDE 50 MG/1
50 TABLET ORAL EVERY 8 HOURS PRN
Qty: 90 TABLET | Refills: 2 | Status: SHIPPED | OUTPATIENT
Start: 2024-02-06

## 2024-02-16 ENCOUNTER — LAB (OUTPATIENT)
Dept: LAB | Facility: HOSPITAL | Age: 64
End: 2024-02-16
Payer: MEDICARE

## 2024-02-16 ENCOUNTER — OFFICE VISIT (OUTPATIENT)
Dept: FAMILY MEDICINE CLINIC | Age: 64
End: 2024-02-16
Payer: MEDICARE

## 2024-02-16 VITALS
HEIGHT: 67 IN | BODY MASS INDEX: 45.99 KG/M2 | HEART RATE: 60 BPM | DIASTOLIC BLOOD PRESSURE: 68 MMHG | WEIGHT: 293 LBS | OXYGEN SATURATION: 98 % | SYSTOLIC BLOOD PRESSURE: 163 MMHG

## 2024-02-16 DIAGNOSIS — G47.33 OBSTRUCTIVE SLEEP APNEA: ICD-10-CM

## 2024-02-16 DIAGNOSIS — E66.01 CLASS 3 SEVERE OBESITY DUE TO EXCESS CALORIES WITH SERIOUS COMORBIDITY AND BODY MASS INDEX (BMI) OF 50.0 TO 59.9 IN ADULT: ICD-10-CM

## 2024-02-16 DIAGNOSIS — E78.2 MIXED HYPERLIPIDEMIA: ICD-10-CM

## 2024-02-16 DIAGNOSIS — I10 ESSENTIAL HYPERTENSION: ICD-10-CM

## 2024-02-16 DIAGNOSIS — M15.9 GENERALIZED OSTEOARTHRITIS: Primary | ICD-10-CM

## 2024-02-16 DIAGNOSIS — R60.0 LOCALIZED EDEMA: ICD-10-CM

## 2024-02-16 DIAGNOSIS — E11.42 TYPE 2 DIABETES MELLITUS WITH DIABETIC POLYNEUROPATHY, WITHOUT LONG-TERM CURRENT USE OF INSULIN: ICD-10-CM

## 2024-02-16 DIAGNOSIS — Z79.899 HIGH RISK MEDICATION USE: ICD-10-CM

## 2024-02-16 DIAGNOSIS — Z12.11 COLON CANCER SCREENING: ICD-10-CM

## 2024-02-16 PROBLEM — E66.813 CLASS 3 SEVERE OBESITY DUE TO EXCESS CALORIES WITH SERIOUS COMORBIDITY AND BODY MASS INDEX (BMI) OF 50.0 TO 59.9 IN ADULT: Status: ACTIVE | Noted: 2023-01-20

## 2024-02-16 LAB
ALBUMIN SERPL-MCNC: 4 G/DL (ref 3.5–5.2)
ALBUMIN UR-MCNC: <1.2 MG/DL
ALBUMIN/GLOB SERPL: 1.3 G/DL
ALP SERPL-CCNC: 93 U/L (ref 39–117)
ALT SERPL W P-5'-P-CCNC: 18 U/L (ref 1–33)
AMPHET+METHAMPHET UR QL: NEGATIVE
AMPHETAMINES UR QL: NEGATIVE
ANION GAP SERPL CALCULATED.3IONS-SCNC: 13.3 MMOL/L (ref 5–15)
AST SERPL-CCNC: 14 U/L (ref 1–32)
BARBITURATES UR QL SCN: NEGATIVE
BASOPHILS # BLD AUTO: 0.07 10*3/MM3 (ref 0–0.2)
BASOPHILS NFR BLD AUTO: 0.8 % (ref 0–1.5)
BENZODIAZ UR QL SCN: NEGATIVE
BILIRUB SERPL-MCNC: 0.2 MG/DL (ref 0–1.2)
BUN SERPL-MCNC: 22 MG/DL (ref 8–23)
BUN/CREAT SERPL: 20.2 (ref 7–25)
BUPRENORPHINE SERPL-MCNC: NEGATIVE NG/ML
CALCIUM SPEC-SCNC: 9.3 MG/DL (ref 8.6–10.5)
CANNABINOIDS SERPL QL: NEGATIVE
CHLORIDE SERPL-SCNC: 99 MMOL/L (ref 98–107)
CHOLEST SERPL-MCNC: 172 MG/DL (ref 0–200)
CO2 SERPL-SCNC: 25.7 MMOL/L (ref 22–29)
COCAINE UR QL: NEGATIVE
CREAT SERPL-MCNC: 1.09 MG/DL (ref 0.57–1)
CREAT UR-MCNC: 33.2 MG/DL
DEPRECATED RDW RBC AUTO: 41.2 FL (ref 37–54)
EGFRCR SERPLBLD CKD-EPI 2021: 57.6 ML/MIN/1.73
EOSINOPHIL # BLD AUTO: 0.15 10*3/MM3 (ref 0–0.4)
EOSINOPHIL NFR BLD AUTO: 1.6 % (ref 0.3–6.2)
ERYTHROCYTE [DISTWIDTH] IN BLOOD BY AUTOMATED COUNT: 15.3 % (ref 12.3–15.4)
EXPIRATION DATE: NORMAL
GLOBULIN UR ELPH-MCNC: 3.2 GM/DL
GLUCOSE SERPL-MCNC: 87 MG/DL (ref 65–99)
HBA1C MFR BLD: 5.8 % (ref 4.8–5.6)
HCT VFR BLD AUTO: 31.4 % (ref 34–46.6)
HDLC SERPL-MCNC: 51 MG/DL (ref 40–60)
HGB BLD-MCNC: 9.7 G/DL (ref 12–15.9)
IMM GRANULOCYTES # BLD AUTO: 0.04 10*3/MM3 (ref 0–0.05)
IMM GRANULOCYTES NFR BLD AUTO: 0.4 % (ref 0–0.5)
LDLC SERPL CALC-MCNC: 89 MG/DL (ref 0–100)
LDLC/HDLC SERPL: 1.63 {RATIO}
LYMPHOCYTES # BLD AUTO: 1.13 10*3/MM3 (ref 0.7–3.1)
LYMPHOCYTES NFR BLD AUTO: 12.3 % (ref 19.6–45.3)
Lab: NORMAL
MCH RBC QN AUTO: 23.3 PG (ref 26.6–33)
MCHC RBC AUTO-ENTMCNC: 30.9 G/DL (ref 31.5–35.7)
MCV RBC AUTO: 75.3 FL (ref 79–97)
MDMA UR QL SCN: NEGATIVE
METHADONE UR QL SCN: NEGATIVE
MICROALBUMIN/CREAT UR: NORMAL MG/G{CREAT}
MONOCYTES # BLD AUTO: 0.52 10*3/MM3 (ref 0.1–0.9)
MONOCYTES NFR BLD AUTO: 5.7 % (ref 5–12)
MORPHINE/OPIATES SCREEN, URINE: NEGATIVE
NEUTROPHILS NFR BLD AUTO: 7.26 10*3/MM3 (ref 1.7–7)
NEUTROPHILS NFR BLD AUTO: 79.2 % (ref 42.7–76)
NRBC BLD AUTO-RTO: 0 /100 WBC (ref 0–0.2)
OXYCODONE UR QL SCN: NEGATIVE
PCP UR QL SCN: NEGATIVE
PLATELET # BLD AUTO: 353 10*3/MM3 (ref 140–450)
PMV BLD AUTO: 11.2 FL (ref 6–12)
POTASSIUM SERPL-SCNC: 4.1 MMOL/L (ref 3.5–5.2)
PROT SERPL-MCNC: 7.2 G/DL (ref 6–8.5)
RBC # BLD AUTO: 4.17 10*6/MM3 (ref 3.77–5.28)
SODIUM SERPL-SCNC: 138 MMOL/L (ref 136–145)
TRIGL SERPL-MCNC: 189 MG/DL (ref 0–150)
VLDLC SERPL-MCNC: 32 MG/DL (ref 5–40)
WBC NRBC COR # BLD AUTO: 9.17 10*3/MM3 (ref 3.4–10.8)

## 2024-02-16 PROCEDURE — 80053 COMPREHEN METABOLIC PANEL: CPT

## 2024-02-16 PROCEDURE — 82570 ASSAY OF URINE CREATININE: CPT | Performed by: FAMILY MEDICINE

## 2024-02-16 PROCEDURE — 83036 HEMOGLOBIN GLYCOSYLATED A1C: CPT

## 2024-02-16 PROCEDURE — 36415 COLL VENOUS BLD VENIPUNCTURE: CPT

## 2024-02-16 PROCEDURE — 80061 LIPID PANEL: CPT

## 2024-02-16 PROCEDURE — 85025 COMPLETE CBC W/AUTO DIFF WBC: CPT

## 2024-02-16 PROCEDURE — 82043 UR ALBUMIN QUANTITATIVE: CPT | Performed by: FAMILY MEDICINE

## 2024-03-15 ENCOUNTER — LAB (OUTPATIENT)
Dept: LAB | Facility: HOSPITAL | Age: 64
End: 2024-03-15
Payer: MEDICARE

## 2024-03-15 DIAGNOSIS — D64.9 ANEMIA, UNSPECIFIED TYPE: ICD-10-CM

## 2024-03-15 LAB
FERRITIN SERPL-MCNC: 12.31 NG/ML (ref 13–150)
FOLATE SERPL-MCNC: 5.65 NG/ML (ref 4.78–24.2)
IRON 24H UR-MRATE: 27 MCG/DL (ref 37–145)
IRON SATN MFR SERPL: 5 % (ref 20–50)
TIBC SERPL-MCNC: 498 MCG/DL (ref 298–536)
TRANSFERRIN SERPL-MCNC: 334 MG/DL (ref 200–360)
VIT B12 BLD-MCNC: 302 PG/ML (ref 211–946)

## 2024-03-15 PROCEDURE — 82728 ASSAY OF FERRITIN: CPT

## 2024-03-15 PROCEDURE — 82607 VITAMIN B-12: CPT

## 2024-03-15 PROCEDURE — 36415 COLL VENOUS BLD VENIPUNCTURE: CPT

## 2024-03-15 PROCEDURE — 83540 ASSAY OF IRON: CPT

## 2024-03-15 PROCEDURE — 84466 ASSAY OF TRANSFERRIN: CPT

## 2024-03-15 PROCEDURE — 82746 ASSAY OF FOLIC ACID SERUM: CPT

## 2024-03-20 DIAGNOSIS — Z12.11 SCREENING FOR COLON CANCER: Primary | ICD-10-CM

## 2024-03-21 ENCOUNTER — TELEPHONE (OUTPATIENT)
Dept: FAMILY MEDICINE CLINIC | Age: 64
End: 2024-03-21
Payer: MEDICARE

## 2024-04-17 DIAGNOSIS — E11.42 DIABETIC POLYNEUROPATHY ASSOCIATED WITH TYPE 2 DIABETES MELLITUS: ICD-10-CM

## 2024-04-17 RX ORDER — GABAPENTIN 400 MG/1
400 CAPSULE ORAL
Qty: 100 CAPSULE | Refills: 1 | Status: SHIPPED | OUTPATIENT
Start: 2024-04-17

## 2024-05-10 ENCOUNTER — OFFICE VISIT (OUTPATIENT)
Dept: FAMILY MEDICINE CLINIC | Age: 64
End: 2024-05-10
Payer: MEDICARE

## 2024-05-10 VITALS
OXYGEN SATURATION: 98 % | HEART RATE: 64 BPM | DIASTOLIC BLOOD PRESSURE: 42 MMHG | SYSTOLIC BLOOD PRESSURE: 133 MMHG | HEIGHT: 67 IN | TEMPERATURE: 98.1 F | WEIGHT: 293 LBS | BODY MASS INDEX: 45.99 KG/M2

## 2024-05-10 DIAGNOSIS — D50.9 IRON DEFICIENCY ANEMIA, UNSPECIFIED IRON DEFICIENCY ANEMIA TYPE: ICD-10-CM

## 2024-05-10 DIAGNOSIS — E11.42 TYPE 2 DIABETES MELLITUS WITH DIABETIC POLYNEUROPATHY, WITHOUT LONG-TERM CURRENT USE OF INSULIN: ICD-10-CM

## 2024-05-10 DIAGNOSIS — I10 ESSENTIAL HYPERTENSION: ICD-10-CM

## 2024-05-10 DIAGNOSIS — M15.9 GENERALIZED OSTEOARTHRITIS: ICD-10-CM

## 2024-05-10 DIAGNOSIS — E66.01 CLASS 3 SEVERE OBESITY DUE TO EXCESS CALORIES WITH SERIOUS COMORBIDITY AND BODY MASS INDEX (BMI) OF 50.0 TO 59.9 IN ADULT: ICD-10-CM

## 2024-05-10 DIAGNOSIS — E78.2 MIXED HYPERLIPIDEMIA: ICD-10-CM

## 2024-05-10 DIAGNOSIS — Z00.00 ANNUAL PHYSICAL EXAM: Primary | ICD-10-CM

## 2024-05-10 DIAGNOSIS — Z79.899 HIGH RISK MEDICATION USE: ICD-10-CM

## 2024-05-10 DIAGNOSIS — G47.33 OBSTRUCTIVE SLEEP APNEA: ICD-10-CM

## 2024-05-10 DIAGNOSIS — K21.9 GASTROESOPHAGEAL REFLUX DISEASE WITHOUT ESOPHAGITIS: ICD-10-CM

## 2024-05-10 DIAGNOSIS — M17.12 PRIMARY OSTEOARTHRITIS OF LEFT KNEE: ICD-10-CM

## 2024-05-10 LAB
AMPHET+METHAMPHET UR QL: NEGATIVE
AMPHETAMINES UR QL: NEGATIVE
BARBITURATES UR QL SCN: NEGATIVE
BENZODIAZ UR QL SCN: NEGATIVE
BUPRENORPHINE SERPL-MCNC: NEGATIVE NG/ML
CANNABINOIDS SERPL QL: NEGATIVE
COCAINE UR QL: NEGATIVE
EXPIRATION DATE: NORMAL
Lab: NORMAL
MDMA UR QL SCN: NEGATIVE
METHADONE UR QL SCN: NEGATIVE
MORPHINE/OPIATES SCREEN, URINE: NEGATIVE
OXYCODONE UR QL SCN: NEGATIVE
PCP UR QL SCN: NEGATIVE

## 2024-05-10 RX ORDER — ATENOLOL AND CHLORTHALIDONE TABLET 100; 25 MG/1; MG/1
1 TABLET ORAL DAILY
Qty: 100 TABLET | Refills: 1 | Status: SHIPPED | OUTPATIENT
Start: 2024-05-10

## 2024-05-15 DIAGNOSIS — E11.42 TYPE 2 DIABETES MELLITUS WITH DIABETIC POLYNEUROPATHY, WITHOUT LONG-TERM CURRENT USE OF INSULIN: ICD-10-CM

## 2024-05-16 RX ORDER — GLIMEPIRIDE 1 MG/1
TABLET ORAL
Qty: 100 TABLET | Refills: 1 | Status: SHIPPED | OUTPATIENT
Start: 2024-05-16

## 2024-05-16 RX ORDER — ATORVASTATIN CALCIUM 20 MG/1
20 TABLET, FILM COATED ORAL DAILY
Qty: 100 TABLET | Refills: 1 | Status: SHIPPED | OUTPATIENT
Start: 2024-05-16

## 2024-05-16 RX ORDER — OMEPRAZOLE 20 MG/1
20 CAPSULE, DELAYED RELEASE ORAL DAILY
Qty: 100 CAPSULE | Refills: 1 | Status: SHIPPED | OUTPATIENT
Start: 2024-05-16

## 2024-07-08 DIAGNOSIS — M15.9 GENERALIZED OSTEOARTHRITIS: ICD-10-CM

## 2024-07-10 RX ORDER — TRAMADOL HYDROCHLORIDE 50 MG/1
50 TABLET ORAL EVERY 8 HOURS PRN
Qty: 90 TABLET | Refills: 0 | Status: SHIPPED | OUTPATIENT
Start: 2024-07-10

## 2024-07-15 RX ORDER — DICLOFENAC SODIUM 75 MG/1
75 TABLET, DELAYED RELEASE ORAL 2 TIMES DAILY WITH MEALS
Qty: 200 TABLET | Refills: 0 | Status: SHIPPED | OUTPATIENT
Start: 2024-07-15

## 2024-08-28 DIAGNOSIS — M15.9 GENERALIZED OSTEOARTHRITIS: ICD-10-CM

## 2024-08-28 RX ORDER — TRAMADOL HYDROCHLORIDE 50 MG/1
50 TABLET ORAL EVERY 8 HOURS PRN
Qty: 90 TABLET | Refills: 2 | Status: SHIPPED | OUTPATIENT
Start: 2024-08-28

## 2024-08-30 ENCOUNTER — OFFICE VISIT (OUTPATIENT)
Dept: FAMILY MEDICINE CLINIC | Age: 64
End: 2024-08-30
Payer: MEDICARE

## 2024-08-30 VITALS
BODY MASS INDEX: 45.99 KG/M2 | DIASTOLIC BLOOD PRESSURE: 68 MMHG | HEIGHT: 67 IN | WEIGHT: 293 LBS | OXYGEN SATURATION: 98 % | HEART RATE: 58 BPM | TEMPERATURE: 98.3 F | SYSTOLIC BLOOD PRESSURE: 139 MMHG

## 2024-08-30 DIAGNOSIS — K21.9 GASTROESOPHAGEAL REFLUX DISEASE WITHOUT ESOPHAGITIS: ICD-10-CM

## 2024-08-30 DIAGNOSIS — M15.9 GENERALIZED OSTEOARTHRITIS: Primary | ICD-10-CM

## 2024-08-30 DIAGNOSIS — I10 ESSENTIAL HYPERTENSION: ICD-10-CM

## 2024-08-30 DIAGNOSIS — Z79.899 HIGH RISK MEDICATION USE: ICD-10-CM

## 2024-08-30 DIAGNOSIS — E78.2 MIXED HYPERLIPIDEMIA: ICD-10-CM

## 2024-08-30 DIAGNOSIS — E11.42 TYPE 2 DIABETES MELLITUS WITH DIABETIC POLYNEUROPATHY, WITHOUT LONG-TERM CURRENT USE OF INSULIN: ICD-10-CM

## 2024-08-30 DIAGNOSIS — G47.33 OBSTRUCTIVE SLEEP APNEA: ICD-10-CM

## 2024-08-30 DIAGNOSIS — F41.1 GENERALIZED ANXIETY DISORDER: ICD-10-CM

## 2024-08-30 DIAGNOSIS — E66.01 CLASS 3 SEVERE OBESITY DUE TO EXCESS CALORIES WITH SERIOUS COMORBIDITY AND BODY MASS INDEX (BMI) OF 50.0 TO 59.9 IN ADULT: ICD-10-CM

## 2024-08-30 PROBLEM — R20.0 BILATERAL HAND NUMBNESS: Status: RESOLVED | Noted: 2023-01-20 | Resolved: 2024-08-30

## 2024-08-30 PROBLEM — G56.22 CUBITAL TUNNEL SYNDROME ON LEFT: Status: RESOLVED | Noted: 2023-03-24 | Resolved: 2024-08-30

## 2024-08-30 PROBLEM — G56.21 CUBITAL TUNNEL SYNDROME ON RIGHT: Status: RESOLVED | Noted: 2023-03-24 | Resolved: 2024-08-30

## 2024-08-30 PROCEDURE — G2211 COMPLEX E/M VISIT ADD ON: HCPCS | Performed by: FAMILY MEDICINE

## 2024-08-30 PROCEDURE — 99214 OFFICE O/P EST MOD 30 MIN: CPT | Performed by: FAMILY MEDICINE

## 2024-08-30 PROCEDURE — 1159F MED LIST DOCD IN RCRD: CPT | Performed by: FAMILY MEDICINE

## 2024-08-30 PROCEDURE — 3078F DIAST BP <80 MM HG: CPT | Performed by: FAMILY MEDICINE

## 2024-08-30 PROCEDURE — 3044F HG A1C LEVEL LT 7.0%: CPT | Performed by: FAMILY MEDICINE

## 2024-08-30 PROCEDURE — 1160F RVW MEDS BY RX/DR IN RCRD: CPT | Performed by: FAMILY MEDICINE

## 2024-08-30 PROCEDURE — 3075F SYST BP GE 130 - 139MM HG: CPT | Performed by: FAMILY MEDICINE

## 2024-08-30 PROCEDURE — 1126F AMNT PAIN NOTED NONE PRSNT: CPT | Performed by: FAMILY MEDICINE

## 2024-08-30 RX ORDER — GABAPENTIN 400 MG/1
400 CAPSULE ORAL 2 TIMES DAILY
Qty: 180 CAPSULE | Refills: 0 | Status: SHIPPED | OUTPATIENT
Start: 2024-08-30

## 2024-08-30 RX ORDER — GABAPENTIN 400 MG/1
400 CAPSULE ORAL 2 TIMES DAILY
Qty: 180 CAPSULE | Refills: 0 | Status: SHIPPED | OUTPATIENT
Start: 2024-08-30 | End: 2024-08-30

## 2024-08-30 NOTE — ASSESSMENT & PLAN NOTE
Stable on atorvastatin 20 mg daily.  Refills were not needed today.  Labs were due today.  Continue to monitor.

## 2024-08-30 NOTE — PROGRESS NOTES
Chief Complaint  Osteoarthritis    Subjective          Sarahi YULISA Palafox presents to [facility] today for routine f/u of chronic issues.  She is accompanied today by her  Dao.     She is on tramadol and diclofenac for generalized OA, worst in the shoulders and knees.  She strained her back lifting her MIL to transfer her over the summer while she was fighting her losing varghese with pancreatic cancer.  She has been stable on her current regimen for quite some time.  She is not a good surgical candidate due to BMI, so we have been managing medically.  No adverse effects.  She ambulates with a cane.  She is following with Daniel Goodwin/[facility].  She has tried Synvisc without relief.     She is on metformin and glimepiride for diabetes.  Last A1c 5.8 in February.  She is due for eye exam (4/2023), done at [place]; she is rescheduled for November.  She is up-to-date on foot exam (10/2023).  She is on a statin.  She is on gabapentin for diabetic peripheral neuropathy.  That has helped somewhat, but she still notices problems with symptoms especially into the night.  She follows with Podiatry Dr. Golden.     She is on atenolol/chlorthalidone for hypertension.  Her blood pressure has been well controlled at home.  She does frequently demonstrate white coat HTN, although today her BP looks good.  No chest pain, shortness of breath or palpitations.       She is on atorvastatin for hyperlipidemia.  No myalgias.      She is on prn buspirone for anxiety.  Anxiety is well-controlled.     She is on Vesicare for urge incontinence.  Urinary symptoms well controlled.     She is on omeprazole for GERD.  No reflux or heartburn.     She is on CPAP for sleep apnea.      Current Outpatient Medications:     atenolol-chlorthalidone (TENORETIC) 100-25 MG per tablet, Take 1 tablet by mouth Daily., Disp: 100 tablet, Rfl: 1    atorvastatin (LIPITOR) 20 MG tablet, TAKE 1 TABLET BY MOUTH ONCE   DAILY, Disp: 100 tablet, Rfl: 1    busPIRone (BUSPAR) 5 MG tablet, Take 1 tablet by mouth 2 (Two) Times a Day As Needed (anxiety)., Disp: 60 tablet, Rfl: 5    diclofenac (VOLTAREN) 75 MG EC tablet, TAKE 1 TABLET BY MOUTH TWICE  DAILY WITH MEALS, Disp: 200 tablet, Rfl: 0    fluticasone (FLONASE) 50 MCG/ACT nasal spray, 1 spray by Each Nare route Daily., Disp: 16 mL, Rfl: 5    gabapentin (NEURONTIN) 400 MG capsule, Take 1 capsule by mouth 2 (Two) Times a Day., Disp: 180 capsule, Rfl: 0    gemfibrozil (LOPID) 600 MG tablet, Take 1 tablet by mouth Daily., Disp: , Rfl:     glimepiride (AMARYL) 1 MG tablet, TAKE 1 TABLET BY MOUTH IN THE  MORNING BEFORE BREAKFAST, Disp: 100 tablet, Rfl: 1    metFORMIN (GLUCOPHAGE) 1000 MG tablet, TAKE 1 TABLET BY MOUTH TWICE  DAILY, Disp: 200 tablet, Rfl: 1    Multiple Vitamins-Minerals (CENTRUM ADULT PO), Take  by mouth Daily., Disp: , Rfl:     omeprazole (priLOSEC) 20 MG capsule, TAKE 1 CAPSULE BY MOUTH DAILY, Disp: 100 capsule, Rfl: 1    silver sulfadiazine (SSD) 1 % cream, Apply  topically to the appropriate area as directed See Admin Instructions. Apply topically to the affected areas twice daily as directed (Patient taking differently: Apply  topically to the appropriate area as directed As Needed.), Disp: 75 g, Rfl: 2    solifenacin (VESICARE) 5 MG tablet, TAKE 1 TABLET BY MOUTH DAILY, Disp: 100 tablet, Rfl: 2    traMADol (ULTRAM) 50 MG tablet, TAKE 1 TABLET BY MOUTH EVERY 8  HOURS AS NEEDED FOR MODERATE  PAIN, Disp: 90 tablet, Rfl: 2    Vitamin D, Cholecalciferol, 50 MCG (2000 UT) capsule, Take 1 capsule by mouth Daily., Disp: , Rfl:   Medications Discontinued During This Encounter   Medication Reason    Iron, Ferrous Sulfate, 325 (65 Fe) MG tablet *Therapy completed    gabapentin (NEURONTIN) 400 MG capsule Reorder    gabapentin (NEURONTIN) 400 MG capsule          Allergies:  Penicillins      Objective   Vital Signs:   Vitals:    08/30/24 1541   BP: 139/68   BP Location: Right arm  "  Patient Position: Sitting   Cuff Size: Adult   Pulse: 58   Temp: 98.3 °F (36.8 °C)   TempSrc: Oral   SpO2: 98%   Weight: (!) 162 kg (357 lb 9.6 oz)   Height: 170.2 cm (67.01\")     Body mass index is 55.99 kg/m².           Physical Exam  Vitals reviewed.   Constitutional:       General: She is not in acute distress.     Appearance: Normal appearance. She is well-developed.   HENT:      Head: Normocephalic and atraumatic.      Right Ear: External ear normal.      Left Ear: External ear normal.      Nose: Nose normal.      Mouth/Throat:      Mouth: Mucous membranes are moist.   Eyes:      Extraocular Movements: Extraocular movements intact.      Conjunctiva/sclera: Conjunctivae normal.      Pupils: Pupils are equal, round, and reactive to light.   Cardiovascular:      Rate and Rhythm: Normal rate and regular rhythm.      Heart sounds: No murmur heard.  Pulmonary:      Effort: Pulmonary effort is normal.      Breath sounds: Normal breath sounds. No wheezing, rhonchi or rales.   Abdominal:      General: Bowel sounds are normal. There is no distension.      Palpations: Abdomen is soft.      Tenderness: There is no abdominal tenderness.   Musculoskeletal:         General: Normal range of motion.   Neurological:      Mental Status: She is alert.   Psychiatric:         Mood and Affect: Mood and affect normal.         Behavior: Behavior normal.         Thought Content: Thought content normal.         Judgment: Judgment normal.           Lab Results   Component Value Date    GLUCOSE 87 02/16/2024    BUN 22 02/16/2024    CREATININE 1.09 (H) 02/16/2024    EGFRIFNONA 52 (L) 11/19/2021    BCR 20.2 02/16/2024    K 4.1 02/16/2024    CO2 25.7 02/16/2024    CALCIUM 9.3 02/16/2024    ALBUMIN 4.0 02/16/2024    LABIL2 1.2 (L) 04/02/2021    AST 14 02/16/2024    ALT 18 02/16/2024       Lab Results   Component Value Date    CHOL 172 02/16/2024    CHLPL 172 04/02/2021    TRIG 189 (H) 02/16/2024    HDL 51 02/16/2024    LDL 89 02/16/2024 "       Lab Results   Component Value Date    WBC 9.17 02/16/2024    HGB 9.7 (L) 02/16/2024    HCT 31.4 (L) 02/16/2024    MCV 75.3 (L) 02/16/2024     02/16/2024     Lab Results   Component Value Date    HGBA1C 5.80 (H) 02/16/2024           Assessment and Plan    Assessment & Plan  Generalized osteoarthritis  Stable on current regimen.  Symptoms are well controlled.  No adverse effects. She does require ongoing use of this controlled substance to function.  Tox screen was due today.  Prior tox screen appropriate.  BRENT was run today.  Refills were not needed today.  RTC 3 months.   High risk medication use    Type 2 diabetes mellitus with diabetic polyneuropathy, without long-term current use of insulin  She is on metformin and glimepiride for diabetes.  Last A1c 5.8 in February.  She is due for eye exam (4/2023), done at [place]; she is rescheduled for November.  She is up-to-date on foot exam (10/2023).  She is on a statin.  She is on gabapentin for diabetic peripheral neuropathy.  That has helped somewhat, but she still notices problems with symptoms especially into the night.  Increasing gabapentin today to 400 mg twice daily.  Controlled substance monitoring as above.  She follows with Podiatry Dr. Golden.  Essential hypertension  Blood pressure has been running at goal.  Continue atenolol/chlorthalidone 100/25 mg daily.  Refills were not needed today.  Labs were needed today.  Continue to monitor.  Mixed hyperlipidemia   Stable on atorvastatin 20 mg daily.  Refills were not needed today.  Labs were due today.  Continue to monitor.  Class 3 severe obesity due to excess calories with serious comorbidity and body mass index (BMI) of 50.0 to 59.9 in adult  Patient's (Body mass index is 55.99 kg/m².) indicates that they are morbidly/severely obese (BMI > 40 or > 35 with obesity - related health condition) with health conditions that include obstructive sleep apnea, hypertension, diabetes mellitus,  dyslipidemias, and GERD . Weight is improving with lifestyle modifications. BMI  is above average; BMI management plan is completed. We discussed portion control and increasing exercise.   Generalized anxiety disorder  Stable on buspirone 5 mg twice daily as needed.  Refills were not needed today.  Continue to monitor.  Gastroesophageal reflux disease without esophagitis  Stable on omeprazole 20 mg daily.  Refills were not needed today.  Continue to monitor.  Wean PPI as able.  Obstructive sleep apnea  Stable on CPAP.    Orders Placed This Encounter   Procedures    Comprehensive Metabolic Panel    Lipid Panel    Hemoglobin A1c    CBC Auto Differential    POC Med[employer] 12 Panel Urine Drug Screen     New Medications Ordered This Visit   Medications    gabapentin (NEURONTIN) 400 MG capsule     Sig: Take 1 capsule by mouth 2 (Two) Times a Day.     Dispense:  180 capsule     Refill:  0     Please send a replace/new response with 100-Day Supply if appropriate to maximize member benefit. Requesting 1 year supply.           Follow Up   Return in about 3 months (around 11/30/2024) for Recheck.  Patient was given instructions and counseling regarding her condition or for health maintenance advice. Please see specific information pulled into the AVS if appropriate.           08/30/2024

## 2024-08-30 NOTE — ASSESSMENT & PLAN NOTE
Stable on buspirone 5 mg twice daily as needed.  Refills were not needed today.  Continue to monitor.

## 2024-08-30 NOTE — ASSESSMENT & PLAN NOTE
She is on metformin and glimepiride for diabetes.  Last A1c 5.8 in February.  She is due for eye exam (4/2023), done at [place]; she is rescheduled for November.  She is up-to-date on foot exam (10/2023).  She is on a statin.  She is on gabapentin for diabetic peripheral neuropathy.  That has helped somewhat, but she still notices problems with symptoms especially into the night.  Increasing gabapentin today to 400 mg twice daily.  Controlled substance monitoring as above.  She follows with Podiatry Dr. Golden.

## 2024-08-30 NOTE — ASSESSMENT & PLAN NOTE
Patient's (Body mass index is 55.99 kg/m².) indicates that they are morbidly/severely obese (BMI > 40 or > 35 with obesity - related health condition) with health conditions that include obstructive sleep apnea, hypertension, diabetes mellitus, dyslipidemias, and GERD . Weight is improving with lifestyle modifications. BMI  is above average; BMI management plan is completed. We discussed portion control and increasing exercise.

## 2024-08-30 NOTE — ASSESSMENT & PLAN NOTE
Blood pressure has been running at goal.  Continue atenolol/chlorthalidone 100/25 mg daily.  Refills were not needed today.  Labs were needed today.  Continue to monitor.

## 2024-09-09 ENCOUNTER — TELEPHONE (OUTPATIENT)
Dept: FAMILY MEDICINE CLINIC | Age: 64
End: 2024-09-09
Payer: MEDICARE

## 2024-09-09 DIAGNOSIS — Z12.31 ENCOUNTER FOR SCREENING MAMMOGRAM FOR BREAST CANCER: Primary | ICD-10-CM

## 2024-09-09 NOTE — TELEPHONE ENCOUNTER
----- Message from Kia Juarez sent at 9/8/2023  5:14 PM EDT -----  Regarding: f/u screening mammo  Please call pt to let her know that she is due for her yearly screening mammogram.  Please pend order for screening mammo and send to me.  Thanks, BZLIVIER

## 2024-09-23 RX ORDER — DICLOFENAC SODIUM 75 MG/1
75 TABLET, DELAYED RELEASE ORAL 2 TIMES DAILY WITH MEALS
Qty: 200 TABLET | Refills: 1 | Status: SHIPPED | OUTPATIENT
Start: 2024-09-23

## 2024-09-26 DIAGNOSIS — E11.42 TYPE 2 DIABETES MELLITUS WITH DIABETIC POLYNEUROPATHY, WITHOUT LONG-TERM CURRENT USE OF INSULIN: ICD-10-CM

## 2024-09-27 RX ORDER — FLUTICASONE PROPIONATE 50 UG/1
1 SPRAY, METERED NASAL DAILY
Qty: 32 G | Refills: 0 | Status: SHIPPED | OUTPATIENT
Start: 2024-09-27

## 2024-09-27 RX ORDER — GLIMEPIRIDE 1 MG/1
1 TABLET ORAL
Qty: 90 TABLET | Refills: 1 | Status: SHIPPED | OUTPATIENT
Start: 2024-09-27

## 2024-09-27 RX ORDER — ATORVASTATIN CALCIUM 20 MG/1
20 TABLET, FILM COATED ORAL DAILY
Qty: 90 TABLET | Refills: 1 | Status: SHIPPED | OUTPATIENT
Start: 2024-09-27

## 2024-10-07 ENCOUNTER — TELEPHONE (OUTPATIENT)
Dept: FAMILY MEDICINE CLINIC | Age: 64
End: 2024-10-07
Payer: MEDICARE

## 2024-10-15 RX ORDER — SOLIFENACIN SUCCINATE 5 MG/1
5 TABLET, FILM COATED ORAL DAILY
Qty: 100 TABLET | Refills: 2 | Status: SHIPPED | OUTPATIENT
Start: 2024-10-15 | End: 2024-10-16 | Stop reason: SDUPTHER

## 2024-10-16 ENCOUNTER — TELEPHONE (OUTPATIENT)
Dept: FAMILY MEDICINE CLINIC | Age: 64
End: 2024-10-16
Payer: MEDICARE

## 2024-10-16 NOTE — TELEPHONE ENCOUNTER
PHARMACY CHANGE    Caller: Sarahi Palafox    Relationship: Self    Best call back number: [phone]     Requested Prescriptions:   Requested Prescriptions     Pending Prescriptions Disp Refills    solifenacin (VESICARE) 5 MG tablet 100 tablet 2     Sig: Take 1 tablet by mouth Daily.        Pharmacy where request should be sent: [facility]     Last office visit with prescribing clinician: 8/30/2024   Last telemedicine visit with prescribing clinician: Visit date not found   Next office visit with prescribing clinician: 12/20/2024     Additional details provided by patient: PATIENT IS OUT OF MEDICATION AND WANTS PRESCRIPTION SENT TO  PHARMACY INSTEAD OF MAIL ORDER THIS ONE TIME.    PLEASE CONTACT PATIENT TO ADVISE.    Does the patient have less than a 3 day supply:  [x] Yes  [] No    Would you like a call back once the refill request has been completed: [x] Yes [] No    If the office needs to give you a call back, can they leave a voicemail: [x] Yes [] No    Cassidy Almendarez Rep   10/16/24 16:30 EDT       Is it okay if the provider responds through MyChart: YES OR CALL MAY LEAVE VOICEMAIL.

## 2024-10-16 NOTE — TELEPHONE ENCOUNTER
Caller: HONG SALCEDO    Relationship to patient: PATIENT    Best call back number: [phone]     Patient is needing: PATIENT WILL BE IN FRIDAY TO HAVE ORDERED LABS DRAWN.          Is it okay if the provider responds through ePatientFinderhart: YES OR CALL MAY LEAVE VOICEMAIL.

## 2024-10-17 RX ORDER — SOLIFENACIN SUCCINATE 5 MG/1
5 TABLET, FILM COATED ORAL DAILY
Qty: 100 TABLET | Refills: 2 | Status: SHIPPED | OUTPATIENT
Start: 2024-10-17

## 2024-10-18 ENCOUNTER — LAB (OUTPATIENT)
Dept: LAB | Facility: HOSPITAL | Age: 64
End: 2024-10-18
Payer: MEDICARE

## 2024-10-18 DIAGNOSIS — E11.42 TYPE 2 DIABETES MELLITUS WITH DIABETIC POLYNEUROPATHY, WITHOUT LONG-TERM CURRENT USE OF INSULIN: ICD-10-CM

## 2024-10-18 LAB
ALBUMIN SERPL-MCNC: 3.6 G/DL (ref 3.5–5.2)
ALBUMIN/GLOB SERPL: 1.1 G/DL
ALP SERPL-CCNC: 94 U/L (ref 39–117)
ALT SERPL W P-5'-P-CCNC: 11 U/L (ref 1–33)
ANION GAP SERPL CALCULATED.3IONS-SCNC: 10.8 MMOL/L (ref 5–15)
AST SERPL-CCNC: 14 U/L (ref 1–32)
BASOPHILS # BLD AUTO: 0.06 10*3/MM3 (ref 0–0.2)
BASOPHILS NFR BLD AUTO: 0.8 % (ref 0–1.5)
BILIRUB SERPL-MCNC: <0.2 MG/DL (ref 0–1.2)
BUN SERPL-MCNC: 25 MG/DL (ref 8–23)
BUN/CREAT SERPL: 19.8 (ref 7–25)
CALCIUM SPEC-SCNC: 9.5 MG/DL (ref 8.6–10.5)
CHLORIDE SERPL-SCNC: 100 MMOL/L (ref 98–107)
CHOLEST SERPL-MCNC: 155 MG/DL (ref 0–200)
CO2 SERPL-SCNC: 26.2 MMOL/L (ref 22–29)
CREAT SERPL-MCNC: 1.26 MG/DL (ref 0.57–1)
DEPRECATED RDW RBC AUTO: 48.3 FL (ref 37–54)
EGFRCR SERPLBLD CKD-EPI 2021: 48.1 ML/MIN/1.73
EOSINOPHIL # BLD AUTO: 0.12 10*3/MM3 (ref 0–0.4)
EOSINOPHIL NFR BLD AUTO: 1.5 % (ref 0.3–6.2)
ERYTHROCYTE [DISTWIDTH] IN BLOOD BY AUTOMATED COUNT: 16.8 % (ref 12.3–15.4)
GLOBULIN UR ELPH-MCNC: 3.3 GM/DL
GLUCOSE SERPL-MCNC: 100 MG/DL (ref 65–99)
HBA1C MFR BLD: 5.9 % (ref 4.8–5.6)
HCT VFR BLD AUTO: 31.8 % (ref 34–46.6)
HDLC SERPL-MCNC: 43 MG/DL (ref 40–60)
HGB BLD-MCNC: 9.3 G/DL (ref 12–15.9)
IMM GRANULOCYTES # BLD AUTO: 0.01 10*3/MM3 (ref 0–0.05)
IMM GRANULOCYTES NFR BLD AUTO: 0.1 % (ref 0–0.5)
LDLC SERPL CALC-MCNC: 86 MG/DL (ref 0–100)
LDLC/HDLC SERPL: 1.9 {RATIO}
LYMPHOCYTES # BLD AUTO: 0.73 10*3/MM3 (ref 0.7–3.1)
LYMPHOCYTES NFR BLD AUTO: 9.4 % (ref 19.6–45.3)
MCH RBC QN AUTO: 22.7 PG (ref 26.6–33)
MCHC RBC AUTO-ENTMCNC: 29.2 G/DL (ref 31.5–35.7)
MCV RBC AUTO: 77.8 FL (ref 79–97)
MONOCYTES # BLD AUTO: 0.43 10*3/MM3 (ref 0.1–0.9)
MONOCYTES NFR BLD AUTO: 5.5 % (ref 5–12)
NEUTROPHILS NFR BLD AUTO: 6.44 10*3/MM3 (ref 1.7–7)
NEUTROPHILS NFR BLD AUTO: 82.7 % (ref 42.7–76)
PLATELET # BLD AUTO: 301 10*3/MM3 (ref 140–450)
PMV BLD AUTO: 10.1 FL (ref 6–12)
POTASSIUM SERPL-SCNC: 4.2 MMOL/L (ref 3.5–5.2)
PROT SERPL-MCNC: 6.9 G/DL (ref 6–8.5)
RBC # BLD AUTO: 4.09 10*6/MM3 (ref 3.77–5.28)
SODIUM SERPL-SCNC: 137 MMOL/L (ref 136–145)
TRIGL SERPL-MCNC: 151 MG/DL (ref 0–150)
VLDLC SERPL-MCNC: 26 MG/DL (ref 5–40)
WBC NRBC COR # BLD AUTO: 7.79 10*3/MM3 (ref 3.4–10.8)

## 2024-10-18 PROCEDURE — 36415 COLL VENOUS BLD VENIPUNCTURE: CPT

## 2024-10-18 PROCEDURE — 85025 COMPLETE CBC W/AUTO DIFF WBC: CPT

## 2024-10-18 PROCEDURE — 83036 HEMOGLOBIN GLYCOSYLATED A1C: CPT

## 2024-10-18 PROCEDURE — 80053 COMPREHEN METABOLIC PANEL: CPT

## 2024-10-18 PROCEDURE — 80061 LIPID PANEL: CPT

## 2024-10-23 ENCOUNTER — TELEPHONE (OUTPATIENT)
Dept: FAMILY MEDICINE CLINIC | Age: 64
End: 2024-10-23
Payer: MEDICARE

## 2024-10-23 NOTE — TELEPHONE ENCOUNTER
Spoke to pt re overdue mammogram ordered 9/10/24, pt is going to speak to insurance regarding where she can get that done that they will cover.

## 2024-11-11 DIAGNOSIS — E11.42 TYPE 2 DIABETES MELLITUS WITH DIABETIC POLYNEUROPATHY, WITHOUT LONG-TERM CURRENT USE OF INSULIN: ICD-10-CM

## 2024-11-12 RX ORDER — GABAPENTIN 400 MG/1
400 CAPSULE ORAL 2 TIMES DAILY
Qty: 180 CAPSULE | Refills: 0 | OUTPATIENT
Start: 2024-11-12

## 2024-11-14 RX ORDER — GABAPENTIN 400 MG/1
400 CAPSULE ORAL 2 TIMES DAILY
Qty: 180 CAPSULE | Refills: 0 | Status: SHIPPED | OUTPATIENT
Start: 2024-11-14

## 2024-11-19 DIAGNOSIS — M15.9 GENERALIZED OSTEOARTHRITIS: ICD-10-CM

## 2024-11-19 RX ORDER — TRAMADOL HYDROCHLORIDE 50 MG/1
50 TABLET ORAL EVERY 8 HOURS PRN
Qty: 90 TABLET | Refills: 2 | Status: SHIPPED | OUTPATIENT
Start: 2024-11-19

## 2024-12-20 ENCOUNTER — OFFICE VISIT (OUTPATIENT)
Dept: FAMILY MEDICINE CLINIC | Age: 64
End: 2024-12-20
Payer: MEDICARE

## 2024-12-20 VITALS
WEIGHT: 293 LBS | HEIGHT: 67 IN | HEART RATE: 67 BPM | TEMPERATURE: 98.2 F | SYSTOLIC BLOOD PRESSURE: 143 MMHG | BODY MASS INDEX: 45.99 KG/M2 | DIASTOLIC BLOOD PRESSURE: 81 MMHG | OXYGEN SATURATION: 93 %

## 2024-12-20 DIAGNOSIS — K21.9 GASTROESOPHAGEAL REFLUX DISEASE WITHOUT ESOPHAGITIS: ICD-10-CM

## 2024-12-20 DIAGNOSIS — F41.1 GENERALIZED ANXIETY DISORDER: ICD-10-CM

## 2024-12-20 DIAGNOSIS — E11.42 TYPE 2 DIABETES MELLITUS WITH DIABETIC POLYNEUROPATHY, WITHOUT LONG-TERM CURRENT USE OF INSULIN: ICD-10-CM

## 2024-12-20 DIAGNOSIS — I10 ESSENTIAL HYPERTENSION: ICD-10-CM

## 2024-12-20 DIAGNOSIS — Z79.899 HIGH RISK MEDICATION USE: ICD-10-CM

## 2024-12-20 DIAGNOSIS — E66.01 CLASS 3 SEVERE OBESITY DUE TO EXCESS CALORIES WITH SERIOUS COMORBIDITY AND BODY MASS INDEX (BMI) OF 50.0 TO 59.9 IN ADULT: ICD-10-CM

## 2024-12-20 DIAGNOSIS — E66.813 CLASS 3 SEVERE OBESITY DUE TO EXCESS CALORIES WITH SERIOUS COMORBIDITY AND BODY MASS INDEX (BMI) OF 50.0 TO 59.9 IN ADULT: ICD-10-CM

## 2024-12-20 DIAGNOSIS — M15.9 GENERALIZED OSTEOARTHRITIS: Primary | ICD-10-CM

## 2024-12-20 DIAGNOSIS — G47.33 OBSTRUCTIVE SLEEP APNEA: ICD-10-CM

## 2024-12-20 DIAGNOSIS — E78.2 MIXED HYPERLIPIDEMIA: ICD-10-CM

## 2024-12-20 RX ORDER — ATENOLOL AND CHLORTHALIDONE TABLET 100; 25 MG/1; MG/1
1 TABLET ORAL DAILY
Qty: 90 TABLET | Refills: 1 | Status: SHIPPED | OUTPATIENT
Start: 2024-12-20

## 2024-12-20 RX ORDER — BUSPIRONE HYDROCHLORIDE 5 MG/1
5 TABLET ORAL 2 TIMES DAILY PRN
Qty: 180 TABLET | Refills: 1 | Status: SHIPPED | OUTPATIENT
Start: 2024-12-20

## 2024-12-20 NOTE — PROGRESS NOTES
Chief Complaint  Osteoarthritis    Subjective          Sarahi YULISA Palafox presents to [facility] today for follow-up on chronic issues.  She is accompanied today by her  Dao.     We are trying to get her colonoscopy to evaluate anemia of unknown etiology but due to some insurance issues, she needs to wait till the new year.  She has her mammogram scheduled for Feb.    She is on tramadol and diclofenac for diffuse OA, worst in the shoulders and knees.  She has been stable on her current regimen for quite some time.  She is not a good surgical candidate due to BMI, so we have been managing medically.  Denies adverse effects.  She ambulates using a cane.  She has tried Synvisc without relief.  She has bee having a lot of problems with numbness and stiffness in the bilateral hands.  L>R.  She is R-handed.  She has seen the orthopedist specialist (Dr. Jones) in the past but not for about a year.       She is on metformin and glimepiride for diabetes.  Last A1c 5.9 in October.  She is UTD on eye exam (11/2024).  She is due for foot exam (10/2023).  She is on a statin.  She is on gabapentin for diabetic peripheral neuropathy.  That has helped somewhat, but she still notices problems with symptoms especially into the night.  She follows with Podiatry Dr. Golden.     She is on atenolol/chlorthalidone for HTN.  BP has been well controlled at home.  She does frequently demonstrate white coat HTN, as she does today.  Denies chest pain, shortness of breath or palpitations.       She is on atorvastatin for HLD.  Denies myalgias.      She is on prn buspirone for anxiety.  Anxiety is well-controlled.     She is on solifenacin for urge incontinence.  Urinary symptoms well controlled.     She is on omeprazole for GERD.  Denies indigestion or reflux.     She is on CPAP for sleep apnea.      Current Outpatient Medications:     atenolol-chlorthalidone (TENORETIC) 100-25 MG per tablet, Take 1 tablet  by mouth Daily., Disp: 90 tablet, Rfl: 1    atorvastatin (LIPITOR) 20 MG tablet, Take 1 tablet by mouth Daily., Disp: 90 tablet, Rfl: 1    busPIRone (BUSPAR) 5 MG tablet, Take 1 tablet by mouth 2 (Two) Times a Day As Needed (anxiety)., Disp: 180 tablet, Rfl: 1    diclofenac (VOLTAREN) 75 MG EC tablet, TAKE 1 TABLET BY MOUTH TWICE  DAILY WITH MEALS, Disp: 200 tablet, Rfl: 1    fluticasone (FLONASE) 50 MCG/ACT nasal spray, Administer 1 spray into the nostril(s) as directed by provider Daily., Disp: 32 g, Rfl: 0    gabapentin (NEURONTIN) 400 MG capsule, Take 1 capsule by mouth 2 (Two) Times a Day., Disp: 180 capsule, Rfl: 0    gemfibrozil (LOPID) 600 MG tablet, Take 1 tablet by mouth Daily., Disp: , Rfl:     glimepiride (AMARYL) 1 MG tablet, Take 1 tablet by mouth Every Morning Before Breakfast., Disp: 90 tablet, Rfl: 1    metFORMIN (GLUCOPHAGE) 1000 MG tablet, Take 1 tablet by mouth 2 (Two) Times a Day., Disp: 180 tablet, Rfl: 1    Multiple Vitamins-Minerals (CENTRUM ADULT PO), Take  by mouth Daily., Disp: , Rfl:     omeprazole (priLOSEC) 20 MG capsule, Take 1 capsule by mouth Daily., Disp: 90 capsule, Rfl: 1    silver sulfadiazine (SSD) 1 % cream, Apply  topically to the appropriate area as directed See Admin Instructions. Apply topically to the affected areas twice daily as directed (Patient taking differently: Apply  topically to the appropriate area as directed As Needed.), Disp: 75 g, Rfl: 2    solifenacin (VESICARE) 5 MG tablet, Take 1 tablet by mouth Daily., Disp: 100 tablet, Rfl: 2    traMADol (ULTRAM) 50 MG tablet, Take 1 tablet by mouth Every 8 (Eight) Hours As Needed for Moderate Pain., Disp: 90 tablet, Rfl: 2    Vitamin D, Cholecalciferol, 50 MCG (2000 UT) capsule, Take 1 capsule by mouth Daily., Disp: , Rfl:   Medications Discontinued During This Encounter   Medication Reason    busPIRone (BUSPAR) 5 MG tablet Reorder    metFORMIN (GLUCOPHAGE) 1000 MG tablet Reorder    atenolol-chlorthalidone (TENORETIC)  "100-25 MG per tablet Reorder         Allergies:  Penicillins      Objective   Vital Signs:   Vitals:    12/20/24 1128 12/20/24 1206   BP: 145/85 143/81   BP Location: Left arm    Patient Position: Sitting    Cuff Size: Large Adult    Pulse: 67    Temp: 98.2 °F (36.8 °C)    TempSrc: Oral    SpO2: 93%    Weight: (!) 166 kg (365 lb 3.2 oz)    Height: 170.2 cm (67.01\")      Body mass index is 57.18 kg/m².           Physical Exam  Vitals reviewed.   Constitutional:       General: She is not in acute distress.     Appearance: Normal appearance. She is well-developed.   HENT:      Head: Normocephalic and atraumatic.      Right Ear: External ear normal.      Left Ear: External ear normal.      Nose: Nose normal.      Mouth/Throat:      Mouth: Mucous membranes are moist.   Eyes:      Extraocular Movements: Extraocular movements intact.      Conjunctiva/sclera: Conjunctivae normal.      Pupils: Pupils are equal, round, and reactive to light.   Cardiovascular:      Rate and Rhythm: Normal rate and regular rhythm.      Pulses:           Dorsalis pedis pulses are 2+ on the right side and 2+ on the left side.      Heart sounds: No murmur heard.  Pulmonary:      Effort: Pulmonary effort is normal.      Breath sounds: Normal breath sounds. No wheezing, rhonchi or rales.   Abdominal:      General: Bowel sounds are normal. There is no distension.      Palpations: Abdomen is soft.      Tenderness: There is no abdominal tenderness.   Musculoskeletal:         General: Normal range of motion.   Feet:      Right foot:      Protective Sensation: 3 sites tested.  0 sites sensed.      Skin integrity: Skin integrity normal. No ulcer or blister.      Toenail Condition: Right toenails are normal.      Left foot:      Protective Sensation: 3 sites tested.  0 sites sensed.      Skin integrity: Skin integrity normal. No ulcer or blister.      Toenail Condition: Left toenails are normal.      Comments: Diabetic Foot Exam Performed and Monofilament " Test Performed     Neurological:      Mental Status: She is alert.   Psychiatric:         Mood and Affect: Mood and affect normal.         Behavior: Behavior normal.         Thought Content: Thought content normal.         Judgment: Judgment normal.           Lab Results   Component Value Date    GLUCOSE 100 (H) 10/18/2024    BUN 25 (H) 10/18/2024    CREATININE 1.26 (H) 10/18/2024    EGFRIFNONA 52 (L) 11/19/2021    BCR 19.8 10/18/2024    K 4.2 10/18/2024    CO2 26.2 10/18/2024    CALCIUM 9.5 10/18/2024    ALBUMIN 3.6 10/18/2024    LABIL2 1.2 (L) 04/02/2021    AST 14 10/18/2024    ALT 11 10/18/2024       Lab Results   Component Value Date    CHOL 155 10/18/2024    CHLPL 172 04/02/2021    TRIG 151 (H) 10/18/2024    HDL 43 10/18/2024    LDL 86 10/18/2024       Lab Results   Component Value Date    WBC 7.79 10/18/2024    HGB 9.3 (L) 10/18/2024    HCT 31.8 (L) 10/18/2024    MCV 77.8 (L) 10/18/2024     10/18/2024            Assessment and Plan    Assessment & Plan  Generalized osteoarthritis  Stable on current regimen.  Symptoms are well controlled.  No adverse effects. She does require ongoing use of this controlled substance to function.  Tox screen was due today.  Prior tox screen appropriate.  BRENT was run today.  Refills were not needed today.  RTC 3 months.       High risk medication use    Orders:    POC Medline 12 Panel Urine Drug Screen    Type 2 diabetes mellitus with diabetic polyneuropathy, without long-term current use of insulin    She is on metformin and glimepiride for diabetes.  Last A1c 5.9 in October.  She is UTD on eye exam (11/2024).  She is due for foot exam (10/2023); exam today shows complete loss of protective sensation.  She is on a statin.  She is on gabapentin for diabetic peripheral neuropathy.  That has helped somewhat, but she still notices problems with symptoms especially into the night.  She follows with Podiatry Dr. Golden.  Orders:    metFORMIN (GLUCOPHAGE) 1000 MG tablet;  Take 1 tablet by mouth 2 (Two) Times a Day.    Essential hypertension    Blood pressure has been running at goal.  Continue atenolol/chlorthalidone 100/25 mg daily.  Refills were needed today.  Labs were not needed today.  Continue to monitor.  Orders:    atenolol-chlorthalidone (TENORETIC) 100-25 MG per tablet; Take 1 tablet by mouth Daily.    Mixed hyperlipidemia     Stable on atorvastatin 20 mg daily and gemfibrozil 600 mg daily.  Refills were not needed today.  Labs were not due today.  Continue to monitor.       Class 3 severe obesity due to excess calories with serious comorbidity and body mass index (BMI) of 50.0 to 59.9 in adult  Patient's (Body mass index is 57.18 kg/m².) indicates that they are morbidly/severely obese (BMI > 40 or > 35 with obesity - related health condition) with health conditions that include obstructive sleep apnea, hypertension, diabetes mellitus, and dyslipidemias . Weight is unchanged. BMI  is above average; BMI management plan is completed. We discussed portion control and increasing exercise.          Generalized anxiety disorder    Stable on buspirone 5 mg twice daily as needed.  Refills were not needed today.  Continue to monitor.  Orders:    busPIRone (BUSPAR) 5 MG tablet; Take 1 tablet by mouth 2 (Two) Times a Day As Needed (anxiety).    Gastroesophageal reflux disease without esophagitis  Stable on omeprazole 20 mg daily.  Refills were needed today.  Continue to monitor.  Wean PPI as able.       Obstructive sleep apnea  Stable on CPAP.           Follow Up   Return in about 3 months (around 3/20/2025) for Recheck.  Patient was given instructions and counseling regarding her condition or for health maintenance advice. Please see specific information pulled into the AVS if appropriate.           12/20/2024

## 2024-12-20 NOTE — ASSESSMENT & PLAN NOTE
Stable on atorvastatin 20 mg daily and gemfibrozil 600 mg daily.  Refills were not needed today.  Labs were not due today.  Continue to monitor.

## 2024-12-20 NOTE — ASSESSMENT & PLAN NOTE
Stable on buspirone 5 mg twice daily as needed.  Refills were not needed today.  Continue to monitor.  Orders:    busPIRone (BUSPAR) 5 MG tablet; Take 1 tablet by mouth 2 (Two) Times a Day As Needed (anxiety).

## 2024-12-20 NOTE — ASSESSMENT & PLAN NOTE
She is on metformin and glimepiride for diabetes.  Last A1c 5.9 in October.  She is UTD on eye exam (11/2024).  She is due for foot exam (10/2023); exam today shows complete loss of protective sensation.  She is on a statin.  She is on gabapentin for diabetic peripheral neuropathy.  That has helped somewhat, but she still notices problems with symptoms especially into the night.  She follows with Podiatry Dr. Golden.  Orders:    metFORMIN (GLUCOPHAGE) 1000 MG tablet; Take 1 tablet by mouth 2 (Two) Times a Day.

## 2024-12-20 NOTE — ASSESSMENT & PLAN NOTE
Stable on omeprazole 20 mg daily.  Refills were needed today.  Continue to monitor.  Wean PPI as able.

## 2024-12-20 NOTE — ASSESSMENT & PLAN NOTE
Patient's (Body mass index is 57.18 kg/m².) indicates that they are morbidly/severely obese (BMI > 40 or > 35 with obesity - related health condition) with health conditions that include obstructive sleep apnea, hypertension, diabetes mellitus, and dyslipidemias . Weight is unchanged. BMI  is above average; BMI management plan is completed. We discussed portion control and increasing exercise.

## 2024-12-20 NOTE — ASSESSMENT & PLAN NOTE
Blood pressure has been running at goal.  Continue atenolol/chlorthalidone 100/25 mg daily.  Refills were needed today.  Labs were not needed today.  Continue to monitor.  Orders:    atenolol-chlorthalidone (TENORETIC) 100-25 MG per tablet; Take 1 tablet by mouth Daily.

## 2025-01-03 DIAGNOSIS — E11.42 TYPE 2 DIABETES MELLITUS WITH DIABETIC POLYNEUROPATHY, WITHOUT LONG-TERM CURRENT USE OF INSULIN: ICD-10-CM

## 2025-01-07 RX ORDER — ATORVASTATIN CALCIUM 20 MG/1
20 TABLET, FILM COATED ORAL DAILY
Qty: 100 TABLET | Refills: 2 | OUTPATIENT
Start: 2025-01-07

## 2025-01-07 RX ORDER — GLIMEPIRIDE 1 MG/1
1 TABLET ORAL
Qty: 100 TABLET | Refills: 2 | OUTPATIENT
Start: 2025-01-07

## 2025-01-27 DIAGNOSIS — E11.42 TYPE 2 DIABETES MELLITUS WITH DIABETIC POLYNEUROPATHY, WITHOUT LONG-TERM CURRENT USE OF INSULIN: ICD-10-CM

## 2025-01-28 RX ORDER — GABAPENTIN 400 MG/1
400 CAPSULE ORAL 2 TIMES DAILY
Qty: 180 CAPSULE | Refills: 0 | Status: SHIPPED | OUTPATIENT
Start: 2025-01-28

## 2025-02-17 ENCOUNTER — OFFICE VISIT (OUTPATIENT)
Dept: FAMILY MEDICINE CLINIC | Age: 65
End: 2025-02-17
Payer: MEDICARE

## 2025-02-17 VITALS
OXYGEN SATURATION: 95 % | TEMPERATURE: 98.3 F | WEIGHT: 293 LBS | HEIGHT: 67 IN | SYSTOLIC BLOOD PRESSURE: 138 MMHG | HEART RATE: 69 BPM | BODY MASS INDEX: 45.99 KG/M2 | DIASTOLIC BLOOD PRESSURE: 76 MMHG

## 2025-02-17 DIAGNOSIS — L02.31 ABSCESS OF LEFT BUTTOCK: Primary | ICD-10-CM

## 2025-02-17 PROCEDURE — 3075F SYST BP GE 130 - 139MM HG: CPT | Performed by: NURSE PRACTITIONER

## 2025-02-17 PROCEDURE — 99214 OFFICE O/P EST MOD 30 MIN: CPT | Performed by: NURSE PRACTITIONER

## 2025-02-17 PROCEDURE — 3078F DIAST BP <80 MM HG: CPT | Performed by: NURSE PRACTITIONER

## 2025-02-17 PROCEDURE — 1126F AMNT PAIN NOTED NONE PRSNT: CPT | Performed by: NURSE PRACTITIONER

## 2025-02-17 RX ORDER — DOXYCYCLINE 100 MG/1
100 CAPSULE ORAL 2 TIMES DAILY
Qty: 20 CAPSULE | Refills: 0 | Status: SHIPPED | OUTPATIENT
Start: 2025-02-17 | End: 2025-02-27

## 2025-02-17 NOTE — PROGRESS NOTES
"Chief Complaint  Sarahi Palafox presents to [facility] for Cyst (Boil on L butt cheek. )    Subjective     History of Present Illness  Area on her left buttock started about 1 week ago.   reports drained on its own, but has become very painful, draining on own.  She did run a fever last week 102      Assessment and Plan     Diagnoses and all orders for this visit:    1. Abscess of left buttock (Primary)  Comments:  doxycyline sent for short term treatment, advised ER if worsening, General surgery for I & D , warm compresses , tylenol  Orders:  -     Ambulatory Referral to General Surgery            Follow Up   No follow-ups on file.  Future Appointments   Date Time Provider Department Center   2/21/2025  1:00 PM [facility] JANIS [facility] 1 [facility] VARUN [facility]   4/4/2025  1:45 PM Kia Juarez MD [facility] BARDS [facility]       No orders of the defined types were placed in this encounter.      There are no discontinued medications.       Review of Systems    Objective     Vitals:    02/17/25 1329   BP: 138/76   BP Location: Left arm   Patient Position: Sitting   Cuff Size: Large Adult   Pulse: 69   Temp: 98.3 °F (36.8 °C)   TempSrc: Oral   SpO2: 95%   Weight: (!) 160 kg (352 lb)   Height: 170.2 cm (67.01\")         Physical Exam  Constitutional:       General: She is not in acute distress.     Appearance: Normal appearance.   HENT:      Head: Normocephalic.   Cardiovascular:      Rate and Rhythm: Normal rate and regular rhythm.   Pulmonary:      Effort: Pulmonary effort is normal.      Breath sounds: Normal breath sounds.   Musculoskeletal:         General: Normal range of motion.   Skin:         Neurological:      General: No focal deficit present.      Mental Status: She is alert and oriented to person, place, and time.   Psychiatric:         Mood and Affect: Mood normal.         Behavior: Behavior normal.               Result Review          Allergies   Allergen Reactions    " Penicillins Rash      Past Medical History:   Diagnosis Date    Allergic 1980    Arthritis 2000    CTS (carpal tunnel syndrome) 2023    Dental disease 07/2014    I'm in the process of getting dentures    Diabetes mellitus 2012    Difficulty walking 2021    Eye drainage     left eye    Gastroesophageal reflux disease without esophagitis     Hyperlipidemia 2018    Hypertension 2010    Ingrown toenail     Knee swelling 2009    Nasal cavity mass     Nasal polyp     Neuropathy     feet and hands    Neuropathy in diabetes Not sure but has gotten worse in the last 6 months    Nosebleed 02/2021    I haven't had one in a while    Osteopenia     Periarthritis of shoulder 2015    PONV (postoperative nausea and vomiting)     Rotator cuff syndrome 2015    Shin splints     Sleep apnea 12/2014    I use a CPAP machine since January 2015     Current Outpatient Medications   Medication Sig Dispense Refill    atenolol-chlorthalidone (TENORETIC) 100-25 MG per tablet Take 1 tablet by mouth Daily. 90 tablet 1    atorvastatin (LIPITOR) 20 MG tablet Take 1 tablet by mouth Daily. 90 tablet 1    busPIRone (BUSPAR) 5 MG tablet Take 1 tablet by mouth 2 (Two) Times a Day As Needed (anxiety). 180 tablet 1    diclofenac (VOLTAREN) 75 MG EC tablet TAKE 1 TABLET BY MOUTH TWICE  DAILY WITH MEALS 200 tablet 1    fluticasone (FLONASE) 50 MCG/ACT nasal spray Administer 1 spray into the nostril(s) as directed by provider Daily. 32 g 0    gabapentin (NEURONTIN) 400 MG capsule Take 1 capsule by mouth 2 (Two) Times a Day. 180 capsule 0    gemfibrozil (LOPID) 600 MG tablet Take 1 tablet by mouth Daily.      glimepiride (AMARYL) 1 MG tablet Take 1 tablet by mouth Every Morning Before Breakfast. 90 tablet 1    metFORMIN (GLUCOPHAGE) 1000 MG tablet Take 1 tablet by mouth 2 (Two) Times a Day. 180 tablet 1    Multiple Vitamins-Minerals (CENTRUM ADULT PO) Take  by mouth Daily.      omeprazole (priLOSEC) 20 MG capsule Take 1 capsule by mouth Daily. 90 capsule 1     silver sulfadiazine (SSD) 1 % cream Apply  topically to the appropriate area as directed See Admin Instructions. Apply topically to the affected areas twice daily as directed (Patient taking differently: Apply  topically to the appropriate area as directed As Needed.) 75 g 2    solifenacin (VESICARE) 5 MG tablet Take 1 tablet by mouth Daily. 100 tablet 2    traMADol (ULTRAM) 50 MG tablet Take 1 tablet by mouth Every 8 (Eight) Hours As Needed for Moderate Pain. 90 tablet 2    Vitamin D, Cholecalciferol, 50 MCG (2000 UT) capsule Take 1 capsule by mouth Daily.      doxycycline (VIBRAMYCIN) 100 MG capsule Take 1 capsule by mouth 2 (Two) Times a Day for 10 days. 20 capsule 0     No current facility-administered medications for this visit.     Past Surgical History:   Procedure Laterality Date     SECTION  ,  and     ENDOSCOPIC FUNCTIONAL SINUS SURGERY (FESS) Left 10/04/2021    Procedure: ENDOSCOPIC FUNCTIONAL SINUS SURGERY with excision of left nasal cavity mass;  Surgeon: Alberto Tavera MD;  Location: [facility] OR;  Service: ENT;  Laterality: Left;    KNEE SURGERY Left     SINUS SURGERY  2021    TUBAL ABDOMINAL LIGATION        Health Maintenance Due   Topic Date Due    Pneumococcal Vaccine 50+ (1 of 2 - PCV) Never done    TDAP/TD VACCINES (1 - Tdap) Never done    ZOSTER VACCINE (1 of 2) Never done    PAP SMEAR  2021    URINE MICROALBUMIN-CREATININE RATIO (uACR)  2021    COLORECTAL CANCER SCREENING  02/10/2024    COVID-19 Vaccine ( season) 2024    HEMOGLOBIN A1C  2025    ANNUAL WELLNESS VISIT  05/10/2025      Immunization History   Administered Date(s) Administered    COVID-19 (PFIZER) Purple Cap Monovalent 2021, 2021, 2022         Part of this note may be an electronic transcription/translation of spoken language to printed   text using the Dragon Dictation System.      FARRAH Blanco

## 2025-02-27 DIAGNOSIS — E11.42 TYPE 2 DIABETES MELLITUS WITH DIABETIC POLYNEUROPATHY, WITHOUT LONG-TERM CURRENT USE OF INSULIN: ICD-10-CM

## 2025-03-07 RX ORDER — DICLOFENAC SODIUM 75 MG/1
75 TABLET, DELAYED RELEASE ORAL 2 TIMES DAILY WITH MEALS
Qty: 200 TABLET | Refills: 0 | Status: SHIPPED | OUTPATIENT
Start: 2025-03-07

## 2025-04-04 ENCOUNTER — OFFICE VISIT (OUTPATIENT)
Dept: FAMILY MEDICINE CLINIC | Age: 65
End: 2025-04-04
Payer: MEDICARE

## 2025-04-04 ENCOUNTER — LAB (OUTPATIENT)
Dept: LAB | Facility: HOSPITAL | Age: 65
End: 2025-04-04
Payer: MEDICARE

## 2025-04-04 ENCOUNTER — RESULTS FOLLOW-UP (OUTPATIENT)
Dept: FAMILY MEDICINE CLINIC | Age: 65
End: 2025-04-04

## 2025-04-04 VITALS
HEIGHT: 67 IN | SYSTOLIC BLOOD PRESSURE: 129 MMHG | DIASTOLIC BLOOD PRESSURE: 82 MMHG | WEIGHT: 293 LBS | OXYGEN SATURATION: 97 % | TEMPERATURE: 98.2 F | HEART RATE: 59 BPM | BODY MASS INDEX: 45.99 KG/M2

## 2025-04-04 DIAGNOSIS — E66.813 CLASS 3 SEVERE OBESITY DUE TO EXCESS CALORIES WITH SERIOUS COMORBIDITY AND BODY MASS INDEX (BMI) OF 50.0 TO 59.9 IN ADULT: ICD-10-CM

## 2025-04-04 DIAGNOSIS — I10 ESSENTIAL HYPERTENSION: ICD-10-CM

## 2025-04-04 DIAGNOSIS — E78.2 MIXED HYPERLIPIDEMIA: ICD-10-CM

## 2025-04-04 DIAGNOSIS — E66.01 CLASS 3 SEVERE OBESITY DUE TO EXCESS CALORIES WITH SERIOUS COMORBIDITY AND BODY MASS INDEX (BMI) OF 50.0 TO 59.9 IN ADULT: ICD-10-CM

## 2025-04-04 DIAGNOSIS — E11.42 TYPE 2 DIABETES MELLITUS WITH DIABETIC POLYNEUROPATHY, WITHOUT LONG-TERM CURRENT USE OF INSULIN: ICD-10-CM

## 2025-04-04 DIAGNOSIS — F41.1 GENERALIZED ANXIETY DISORDER: ICD-10-CM

## 2025-04-04 DIAGNOSIS — K21.9 GASTROESOPHAGEAL REFLUX DISEASE WITHOUT ESOPHAGITIS: ICD-10-CM

## 2025-04-04 DIAGNOSIS — M15.9 GENERALIZED OSTEOARTHRITIS: Primary | ICD-10-CM

## 2025-04-04 DIAGNOSIS — Z79.899 HIGH RISK MEDICATION USE: ICD-10-CM

## 2025-04-04 DIAGNOSIS — G47.33 OBSTRUCTIVE SLEEP APNEA: ICD-10-CM

## 2025-04-04 LAB
ALBUMIN SERPL-MCNC: 3.4 G/DL (ref 3.5–5.2)
ALBUMIN UR-MCNC: 1.4 MG/DL
ALBUMIN/GLOB SERPL: 0.9 G/DL
ALP SERPL-CCNC: 91 U/L (ref 39–117)
ALT SERPL W P-5'-P-CCNC: 12 U/L (ref 1–33)
AMPHET+METHAMPHET UR QL: NEGATIVE
AMPHETAMINES UR QL: NEGATIVE
ANION GAP SERPL CALCULATED.3IONS-SCNC: 13 MMOL/L (ref 5–15)
AST SERPL-CCNC: 18 U/L (ref 1–32)
BARBITURATES UR QL SCN: NEGATIVE
BENZODIAZ UR QL SCN: NEGATIVE
BILIRUB SERPL-MCNC: 0.3 MG/DL (ref 0–1.2)
BUN SERPL-MCNC: 28 MG/DL (ref 8–23)
BUN/CREAT SERPL: 20.7 (ref 7–25)
BUPRENORPHINE SERPL-MCNC: NEGATIVE NG/ML
CALCIUM SPEC-SCNC: 9.6 MG/DL (ref 8.6–10.5)
CANNABINOIDS SERPL QL: NEGATIVE
CHLORIDE SERPL-SCNC: 99 MMOL/L (ref 98–107)
CHOLEST SERPL-MCNC: 153 MG/DL (ref 0–200)
CO2 SERPL-SCNC: 26 MMOL/L (ref 22–29)
COCAINE UR QL: NEGATIVE
CREAT SERPL-MCNC: 1.35 MG/DL (ref 0.57–1)
CREAT UR-MCNC: 167.9 MG/DL
EGFRCR SERPLBLD CKD-EPI 2021: 44.3 ML/MIN/1.73
EXPIRATION DATE: NORMAL
GLOBULIN UR ELPH-MCNC: 3.6 GM/DL
GLUCOSE SERPL-MCNC: 107 MG/DL (ref 65–99)
HBA1C MFR BLD: 5.8 % (ref 4.8–5.6)
HDLC SERPL-MCNC: 49 MG/DL (ref 40–60)
LDLC SERPL CALC-MCNC: 73 MG/DL (ref 0–100)
LDLC/HDLC SERPL: 1.36 {RATIO}
Lab: NORMAL
MDMA UR QL SCN: NEGATIVE
METHADONE UR QL SCN: NEGATIVE
MICROALBUMIN/CREAT UR: 8.3 MG/G (ref 0–29)
MORPHINE/OPIATES SCREEN, URINE: NEGATIVE
OXYCODONE UR QL SCN: NEGATIVE
PCP UR QL SCN: NEGATIVE
POTASSIUM SERPL-SCNC: 4.4 MMOL/L (ref 3.5–5.2)
PROT SERPL-MCNC: 7 G/DL (ref 6–8.5)
SODIUM SERPL-SCNC: 138 MMOL/L (ref 136–145)
TRIGL SERPL-MCNC: 186 MG/DL (ref 0–150)
VLDLC SERPL-MCNC: 31 MG/DL (ref 5–40)

## 2025-04-04 PROCEDURE — 82043 UR ALBUMIN QUANTITATIVE: CPT | Performed by: FAMILY MEDICINE

## 2025-04-04 PROCEDURE — 83036 HEMOGLOBIN GLYCOSYLATED A1C: CPT

## 2025-04-04 PROCEDURE — 36415 COLL VENOUS BLD VENIPUNCTURE: CPT

## 2025-04-04 PROCEDURE — 80053 COMPREHEN METABOLIC PANEL: CPT

## 2025-04-04 PROCEDURE — 82570 ASSAY OF URINE CREATININE: CPT | Performed by: FAMILY MEDICINE

## 2025-04-04 PROCEDURE — 80061 LIPID PANEL: CPT

## 2025-04-04 RX ORDER — OMEPRAZOLE 20 MG/1
20 CAPSULE, DELAYED RELEASE ORAL DAILY
Qty: 90 CAPSULE | Refills: 1 | Status: SHIPPED | OUTPATIENT
Start: 2025-04-04

## 2025-04-04 RX ORDER — BUSPIRONE HYDROCHLORIDE 5 MG/1
5 TABLET ORAL 2 TIMES DAILY PRN
Qty: 180 TABLET | Refills: 1 | Status: SHIPPED | OUTPATIENT
Start: 2025-04-04

## 2025-04-04 RX ORDER — ATORVASTATIN CALCIUM 20 MG/1
20 TABLET, FILM COATED ORAL DAILY
Qty: 90 TABLET | Refills: 1 | Status: SHIPPED | OUTPATIENT
Start: 2025-04-04

## 2025-04-04 RX ORDER — GLIMEPIRIDE 1 MG/1
1 TABLET ORAL
Qty: 90 TABLET | Refills: 1 | Status: SHIPPED | OUTPATIENT
Start: 2025-04-04

## 2025-04-04 RX ORDER — ATENOLOL AND CHLORTHALIDONE TABLET 100; 25 MG/1; MG/1
1 TABLET ORAL DAILY
Qty: 90 TABLET | Refills: 1 | Status: SHIPPED | OUTPATIENT
Start: 2025-04-04

## 2025-04-04 NOTE — ASSESSMENT & PLAN NOTE
Patient's (Body mass index is 53.43 kg/m².) indicates that they are morbidly/severely obese (BMI > 40 or > 35 with obesity - related health condition) with health conditions that include obstructive sleep apnea, hypertension, diabetes mellitus, dyslipidemias, GERD, and osteoarthritis . Weight is improving with lifestyle modifications. BMI  is above average; BMI management plan is completed. We discussed portion control and increasing exercise.

## 2025-04-04 NOTE — ASSESSMENT & PLAN NOTE
Blood pressure has been running at goal.  Continue atenolol/chlorthalidone 100 mg / 25 mg daily.  Refills were needed today.  Labs were needed today.  Continue to monitor.  Orders:    atenolol-chlorthalidone (TENORETIC) 100-25 MG per tablet; Take 1 tablet by mouth Daily.

## 2025-04-04 NOTE — ASSESSMENT & PLAN NOTE
She is on metformin and glimepiride for diabetes.  Last A1c 5.9 in October.  She is UTD on eye exam (11/2024).  She is UTD on foot exam (12/2024).  She is on a statin.  She is on gabapentin for diabetic peripheral neuropathy.  Pain much better.  She is following with Podiatry Dr. Golden.  Orders:    Comprehensive Metabolic Panel; Future    Lipid Panel; Future    Hemoglobin A1c; Future    Microalbumin / Creatinine Urine Ratio - Urine, Clean Catch; Future    glimepiride (AMARYL) 1 MG tablet; Take 1 tablet by mouth Every Morning Before Breakfast.

## 2025-04-04 NOTE — ASSESSMENT & PLAN NOTE
Stable on atorvastatin 20 mg daily.  Refills were needed today.  Labs were due today.  Continue to monitor.  Orders:    atorvastatin (LIPITOR) 20 MG tablet; Take 1 tablet by mouth Daily.

## 2025-04-04 NOTE — PROGRESS NOTES
Chief Complaint  Osteoarthritis    Subjective          Sarahi D Vivienne presents to [facility] today for routine f/u of chronic issues.  She is accompanied today by her  Dao.     Mammogram is scheduled for 4/18/2025.  She is due for colonoscopy.    She had to get I&D of a L buttock abscess with Dr. Soto back in Feb.  Completed course of doxycycline for superimposed cellulitis as well.  She saw Dr. Soto last week and the wound is healing well, albeit a bit slowly, so they are going to get her in with [facility] to help speed up the process.    She is on tramadol and diclofenac for arthritis, worst in the shoulders and knees.  We have been managing medically as she is not a good surgical candidate due to BMI.  No adverse effects.  Ambulates with a cane.  She has tried Synvisc without relief.  She is now following with [facility] for bilateral carpal tunnel syndrome.  She was seen there a couple of weeks ago and got injections.  That helped just a little, so next time they are planning to increase the dose a bit.     She is on metformin and glimepiride for diabetes.  Last A1c 5.9 in October.  She is UTD on eye exam (11/2024).  She is UTD on foot exam (12/2024).  She is on a statin.  She is on gabapentin for diabetic peripheral neuropathy.  Pain much better.  She is following with Podiatry Dr. Golden.     She is on atenolol/chlorthalidone for hypertension.  BP has been well controlled at home.  No CP, SOB or palpitations.       She is on atorvastatin for hyperlipidemia.  No myalgias.      She is on prn buspirone for anxiety.  No anxiety or panic attacks.     She is on solifenacin for urge incontinence.  Urinary symptoms well controlled.     She is on omeprazole for GERD.  No heartburn or indigestion.     She is on CPAP for ROSI.      Current Outpatient Medications:     atenolol-chlorthalidone (TENORETIC) 100-25 MG per tablet, Take 1 tablet by mouth Daily., Disp: 90  tablet, Rfl: 1    atorvastatin (LIPITOR) 20 MG tablet, Take 1 tablet by mouth Daily., Disp: 90 tablet, Rfl: 1    busPIRone (BUSPAR) 5 MG tablet, Take 1 tablet by mouth 2 (Two) Times a Day As Needed (anxiety)., Disp: 180 tablet, Rfl: 1    diclofenac (VOLTAREN) 75 MG EC tablet, TAKE 1 TABLET BY MOUTH TWICE  DAILY WITH MEALS, Disp: 200 tablet, Rfl: 0    fluticasone (FLONASE) 50 MCG/ACT nasal spray, Administer 1 spray into the nostril(s) as directed by provider Daily., Disp: 32 g, Rfl: 0    gabapentin (NEURONTIN) 400 MG capsule, Take 1 capsule by mouth 2 (Two) Times a Day., Disp: 180 capsule, Rfl: 0    gemfibrozil (LOPID) 600 MG tablet, Take 1 tablet by mouth Daily., Disp: , Rfl:     glimepiride (AMARYL) 1 MG tablet, Take 1 tablet by mouth Every Morning Before Breakfast., Disp: 90 tablet, Rfl: 1    metFORMIN (GLUCOPHAGE) 1000 MG tablet, TAKE 1 TABLET BY MOUTH TWICE  DAILY, Disp: 200 tablet, Rfl: 1    Multiple Vitamins-Minerals (CENTRUM ADULT PO), Take  by mouth Daily., Disp: , Rfl:     omeprazole (priLOSEC) 20 MG capsule, Take 1 capsule by mouth Daily., Disp: 90 capsule, Rfl: 1    silver sulfadiazine (SSD) 1 % cream, Apply  topically to the appropriate area as directed See Admin Instructions. Apply topically to the affected areas twice daily as directed (Patient taking differently: Apply  topically to the appropriate area as directed As Needed.), Disp: 75 g, Rfl: 2    solifenacin (VESICARE) 5 MG tablet, Take 1 tablet by mouth Daily., Disp: 100 tablet, Rfl: 2    traMADol (ULTRAM) 50 MG tablet, Take 1 tablet by mouth Every 8 (Eight) Hours As Needed for Moderate Pain., Disp: 90 tablet, Rfl: 2    Vitamin D, Cholecalciferol, 50 MCG (2000 UT) capsule, Take 1 capsule by mouth Daily., Disp: , Rfl:   Medications Discontinued During This Encounter   Medication Reason    omeprazole (priLOSEC) 20 MG capsule Reorder    glimepiride (AMARYL) 1 MG tablet Reorder    atorvastatin (LIPITOR) 20 MG tablet Reorder    atenolol-chlorthalidone  "(TENORETIC) 100-25 MG per tablet Reorder    busPIRone (BUSPAR) 5 MG tablet Reorder           Allergies:  Penicillins      Objective   Vital Signs:   Vitals:    04/04/25 1350   BP: 129/82   BP Location: Left arm   Patient Position: Sitting   Cuff Size: Large Adult   Pulse: 59   Temp: 98.2 °F (36.8 °C)   TempSrc: Oral   SpO2: 97%   Weight: (!) 155 kg (341 lb 3.2 oz)   Height: 170.2 cm (67.01\")       Body mass index is 53.43 kg/m².           Physical Exam  Vitals reviewed.   Constitutional:       General: She is not in acute distress.     Appearance: Normal appearance. She is well-developed.   HENT:      Head: Normocephalic and atraumatic.      Right Ear: External ear normal.      Left Ear: External ear normal.   Eyes:      Extraocular Movements: Extraocular movements intact.      Conjunctiva/sclera: Conjunctivae normal.      Pupils: Pupils are equal, round, and reactive to light.   Cardiovascular:      Rate and Rhythm: Normal rate and regular rhythm.      Heart sounds: No murmur heard.  Pulmonary:      Effort: Pulmonary effort is normal.      Breath sounds: Normal breath sounds. No wheezing, rhonchi or rales.   Abdominal:      General: Bowel sounds are normal. There is no distension.      Palpations: Abdomen is soft.      Tenderness: There is no abdominal tenderness.   Musculoskeletal:         General: Normal range of motion.   Neurological:      Mental Status: She is alert.   Psychiatric:         Mood and Affect: Affect normal.           Lab Results   Component Value Date    GLUCOSE 100 (H) 10/18/2024    BUN 25 (H) 10/18/2024    CREATININE 1.26 (H) 10/18/2024    EGFRIFNONA 52 (L) 11/19/2021    BCR 19.8 10/18/2024    K 4.2 10/18/2024    CO2 26.2 10/18/2024    CALCIUM 9.5 10/18/2024    ALBUMIN 3.6 10/18/2024    AST 14 10/18/2024    ALT 11 10/18/2024       Lab Results   Component Value Date    CHOL 155 10/18/2024    CHLPL 172 04/02/2021    TRIG 151 (H) 10/18/2024    HDL 43 10/18/2024    LDL 86 10/18/2024       Lab " Results   Component Value Date    WBC 7.79 10/18/2024    HGB 9.3 (L) 10/18/2024    HCT 31.8 (L) 10/18/2024    MCV 77.8 (L) 10/18/2024     10/18/2024            Assessment and Plan    Assessment & Plan  Generalized osteoarthritis  Stable on current regimen.  Symptoms are well controlled.  No adverse effects. She does require ongoing use of this controlled substance to function.  Tox screen was due today.  Prior tox screen appropriate.  BRENT was run today.  Refills were not needed today.  RTC 3 months.       High risk medication use    Orders:    POC Medline 12 Panel Urine Drug Screen    Type 2 diabetes mellitus with diabetic polyneuropathy, without long-term current use of insulin    She is on metformin and glimepiride for diabetes.  Last A1c 5.9 in October.  She is UTD on eye exam (11/2024).  She is UTD on foot exam (12/2024).  She is on a statin.  She is on gabapentin for diabetic peripheral neuropathy.  Pain much better.  She is following with Podiatry Dr. Golden.  Orders:    Comprehensive Metabolic Panel; Future    Lipid Panel; Future    Hemoglobin A1c; Future    Microalbumin / Creatinine Urine Ratio - Urine, Clean Catch; Future    glimepiride (AMARYL) 1 MG tablet; Take 1 tablet by mouth Every Morning Before Breakfast.    Essential hypertension    Blood pressure has been running at goal.  Continue atenolol/chlorthalidone 100 mg / 25 mg daily.  Refills were needed today.  Labs were needed today.  Continue to monitor.  Orders:    atenolol-chlorthalidone (TENORETIC) 100-25 MG per tablet; Take 1 tablet by mouth Daily.    Mixed hyperlipidemia     Stable on atorvastatin 20 mg daily.  Refills were needed today.  Labs were due today.  Continue to monitor.  Orders:    atorvastatin (LIPITOR) 20 MG tablet; Take 1 tablet by mouth Daily.    Class 3 severe obesity due to excess calories with serious comorbidity and body mass index (BMI) of 50.0 to 59.9 in adult  Patient's (Body mass index is 53.43 kg/m².) indicates  that they are morbidly/severely obese (BMI > 40 or > 35 with obesity - related health condition) with health conditions that include obstructive sleep apnea, hypertension, diabetes mellitus, dyslipidemias, GERD, and osteoarthritis . Weight is improving with lifestyle modifications. BMI  is above average; BMI management plan is completed. We discussed portion control and increasing exercise.          Generalized anxiety disorder    Stable on buspirone 5 mg BID.  Refills were needed today.  Continue to monitor.  Orders:    busPIRone (BUSPAR) 5 MG tablet; Take 1 tablet by mouth 2 (Two) Times a Day As Needed (anxiety).    Gastroesophageal reflux disease without esophagitis    Orders:    omeprazole (priLOSEC) 20 MG capsule; Take 1 capsule by mouth Daily.    Obstructive sleep apnea  Stable on CPAP.           Follow Up   Return in about 3 months (around 7/4/2025) for Medicare Wellness.  Patient was given instructions and counseling regarding her condition or for health maintenance advice. Please see specific information pulled into the AVS if appropriate.           12/20/2024

## 2025-04-04 NOTE — ASSESSMENT & PLAN NOTE
Stable on buspirone 5 mg BID.  Refills were needed today.  Continue to monitor.  Orders:    busPIRone (BUSPAR) 5 MG tablet; Take 1 tablet by mouth 2 (Two) Times a Day As Needed (anxiety).

## 2025-04-16 DIAGNOSIS — E11.42 TYPE 2 DIABETES MELLITUS WITH DIABETIC POLYNEUROPATHY, WITHOUT LONG-TERM CURRENT USE OF INSULIN: ICD-10-CM

## 2025-04-16 RX ORDER — GABAPENTIN 400 MG/1
400 CAPSULE ORAL 2 TIMES DAILY
Qty: 180 CAPSULE | Refills: 0 | Status: SHIPPED | OUTPATIENT
Start: 2025-04-16

## 2025-04-16 NOTE — TELEPHONE ENCOUNTER
Controlled refill request:  Requested Prescriptions     Pending Prescriptions Disp Refills    gabapentin (NEURONTIN) 400 MG capsule 180 capsule 0     Sig: Take 1 capsule by mouth 2 (Two) Times a Day.      Last OV:  4/4/2025  Next OV:  8/8/2025  Last fill:  1/28/25  Last tox:  4/4/25

## 2025-04-18 ENCOUNTER — HOSPITAL ENCOUNTER (OUTPATIENT)
Dept: MAMMOGRAPHY | Facility: HOSPITAL | Age: 65
Discharge: HOME | End: 2025-04-18
Admitting: FAMILY MEDICINE
Payer: MEDICARE

## 2025-04-18 DIAGNOSIS — Z12.31 ENCOUNTER FOR SCREENING MAMMOGRAM FOR BREAST CANCER: ICD-10-CM

## 2025-04-18 PROCEDURE — 77067 SCR MAMMO BI INCL CAD: CPT

## 2025-04-18 PROCEDURE — 77063 BREAST TOMOSYNTHESIS BI: CPT

## 2025-06-03 DIAGNOSIS — M15.9 GENERALIZED OSTEOARTHRITIS: ICD-10-CM

## 2025-06-03 NOTE — TELEPHONE ENCOUNTER
Controlled refill request:  Requested Prescriptions      No prescriptions requested or ordered in this encounter      Last OV:  4/4/2025  Next OV:  8/8/2025  Last fill:  11/19/24  Last tox:  4/4/25

## 2025-06-04 RX ORDER — TRAMADOL HYDROCHLORIDE 50 MG/1
50 TABLET ORAL EVERY 8 HOURS PRN
Qty: 90 TABLET | Refills: 2 | Status: SHIPPED | OUTPATIENT
Start: 2025-06-04

## 2025-06-12 DIAGNOSIS — I10 ESSENTIAL HYPERTENSION: ICD-10-CM

## 2025-06-12 DIAGNOSIS — E11.42 TYPE 2 DIABETES MELLITUS WITH DIABETIC POLYNEUROPATHY, WITHOUT LONG-TERM CURRENT USE OF INSULIN: ICD-10-CM

## 2025-06-12 DIAGNOSIS — E78.2 MIXED HYPERLIPIDEMIA: ICD-10-CM

## 2025-06-12 DIAGNOSIS — K21.9 GASTROESOPHAGEAL REFLUX DISEASE WITHOUT ESOPHAGITIS: ICD-10-CM

## 2025-06-12 RX ORDER — ATORVASTATIN CALCIUM 20 MG/1
20 TABLET, FILM COATED ORAL DAILY
Qty: 100 TABLET | Refills: 2 | OUTPATIENT
Start: 2025-06-12

## 2025-06-12 RX ORDER — OMEPRAZOLE 20 MG/1
20 CAPSULE, DELAYED RELEASE ORAL DAILY
Qty: 100 CAPSULE | Refills: 2 | OUTPATIENT
Start: 2025-06-12

## 2025-06-12 RX ORDER — GLIMEPIRIDE 1 MG/1
1 TABLET ORAL
Qty: 100 TABLET | Refills: 2 | OUTPATIENT
Start: 2025-06-12

## 2025-06-12 RX ORDER — ATENOLOL AND CHLORTHALIDONE TABLET 100; 25 MG/1; MG/1
1 TABLET ORAL DAILY
Qty: 100 TABLET | Refills: 2 | OUTPATIENT
Start: 2025-06-12

## 2025-06-27 DIAGNOSIS — E11.42 TYPE 2 DIABETES MELLITUS WITH DIABETIC POLYNEUROPATHY, WITHOUT LONG-TERM CURRENT USE OF INSULIN: ICD-10-CM

## 2025-06-27 RX ORDER — GABAPENTIN 400 MG/1
400 CAPSULE ORAL 2 TIMES DAILY
Qty: 180 CAPSULE | Refills: 0 | Status: SHIPPED | OUTPATIENT
Start: 2025-06-27

## 2025-06-27 NOTE — TELEPHONE ENCOUNTER
Mail order takes a few weeks to come in.    Controlled refill request:  Requested Prescriptions      No prescriptions requested or ordered in this encounter      Last OV:  4/4/2025  Next OV:  8/8/2025  Last fill:  4/16/25  Last tox:  4/4/25

## 2025-07-01 DIAGNOSIS — K21.9 GASTROESOPHAGEAL REFLUX DISEASE WITHOUT ESOPHAGITIS: ICD-10-CM

## 2025-07-02 RX ORDER — OMEPRAZOLE 20 MG/1
20 CAPSULE, DELAYED RELEASE ORAL DAILY
Qty: 100 CAPSULE | Refills: 0 | Status: SHIPPED | OUTPATIENT
Start: 2025-07-02

## 2025-07-06 DIAGNOSIS — E11.42 TYPE 2 DIABETES MELLITUS WITH DIABETIC POLYNEUROPATHY, WITHOUT LONG-TERM CURRENT USE OF INSULIN: ICD-10-CM

## 2025-07-06 DIAGNOSIS — I10 ESSENTIAL HYPERTENSION: ICD-10-CM

## 2025-07-06 DIAGNOSIS — E78.2 MIXED HYPERLIPIDEMIA: ICD-10-CM

## 2025-07-07 RX ORDER — ATENOLOL AND CHLORTHALIDONE TABLET 100; 25 MG/1; MG/1
1 TABLET ORAL DAILY
Qty: 100 TABLET | Refills: 0 | Status: SHIPPED | OUTPATIENT
Start: 2025-07-07

## 2025-07-07 RX ORDER — GLIMEPIRIDE 1 MG/1
1 TABLET ORAL
Qty: 100 TABLET | Refills: 0 | Status: SHIPPED | OUTPATIENT
Start: 2025-07-07

## 2025-07-07 RX ORDER — ATORVASTATIN CALCIUM 20 MG/1
20 TABLET, FILM COATED ORAL DAILY
Qty: 100 TABLET | Refills: 0 | Status: SHIPPED | OUTPATIENT
Start: 2025-07-07

## 2025-08-02 DIAGNOSIS — E11.42 TYPE 2 DIABETES MELLITUS WITH DIABETIC POLYNEUROPATHY, WITHOUT LONG-TERM CURRENT USE OF INSULIN: ICD-10-CM

## 2025-08-02 DIAGNOSIS — I10 ESSENTIAL HYPERTENSION: ICD-10-CM

## 2025-08-02 DIAGNOSIS — K21.9 GASTROESOPHAGEAL REFLUX DISEASE WITHOUT ESOPHAGITIS: ICD-10-CM

## 2025-08-02 DIAGNOSIS — E78.2 MIXED HYPERLIPIDEMIA: ICD-10-CM

## 2025-08-04 RX ORDER — GLIMEPIRIDE 1 MG/1
1 TABLET ORAL
Qty: 100 TABLET | Refills: 2 | OUTPATIENT
Start: 2025-08-04

## 2025-08-04 RX ORDER — ATENOLOL AND CHLORTHALIDONE TABLET 100; 25 MG/1; MG/1
1 TABLET ORAL DAILY
Qty: 100 TABLET | Refills: 2 | OUTPATIENT
Start: 2025-08-04

## 2025-08-04 RX ORDER — OMEPRAZOLE 20 MG/1
20 CAPSULE, DELAYED RELEASE ORAL DAILY
Qty: 100 CAPSULE | Refills: 2 | OUTPATIENT
Start: 2025-08-04

## 2025-08-04 RX ORDER — ATORVASTATIN CALCIUM 20 MG/1
20 TABLET, FILM COATED ORAL DAILY
Qty: 100 TABLET | Refills: 2 | OUTPATIENT
Start: 2025-08-04

## 2025-08-09 ENCOUNTER — APPOINTMENT (OUTPATIENT)
Dept: GENERAL RADIOLOGY | Facility: HOSPITAL | Age: 65
DRG: 871 | End: 2025-08-09
Payer: MEDICARE

## 2025-08-09 ENCOUNTER — HOSPITAL ENCOUNTER (INPATIENT)
Facility: HOSPITAL | Age: 65
LOS: 7 days | Discharge: HOME | DRG: 871 | End: 2025-08-16
Attending: STUDENT IN AN ORGANIZED HEALTH CARE EDUCATION/TRAINING PROGRAM | Admitting: STUDENT IN AN ORGANIZED HEALTH CARE EDUCATION/TRAINING PROGRAM
Payer: MEDICARE

## 2025-08-09 DIAGNOSIS — R65.21 SEPTIC SHOCK: ICD-10-CM

## 2025-08-09 DIAGNOSIS — N17.9 AKI (ACUTE KIDNEY INJURY): ICD-10-CM

## 2025-08-09 DIAGNOSIS — Z78.9 DECREASED ACTIVITIES OF DAILY LIVING (ADL): ICD-10-CM

## 2025-08-09 DIAGNOSIS — A41.9 SEPTIC SHOCK: ICD-10-CM

## 2025-08-09 DIAGNOSIS — N17.0 ATN (ACUTE TUBULAR NECROSIS): ICD-10-CM

## 2025-08-09 DIAGNOSIS — R26.2 DIFFICULTY WALKING: ICD-10-CM

## 2025-08-09 DIAGNOSIS — K80.50 CHOLEDOCHOLITHIASIS: Primary | ICD-10-CM

## 2025-08-09 LAB
ALBUMIN SERPL-MCNC: 2.7 G/DL (ref 3.5–5.2)
ALP SERPL-CCNC: 346 U/L (ref 39–117)
ALT SERPL W P-5'-P-CCNC: 111 U/L (ref 1–33)
ANION GAP SERPL CALCULATED.3IONS-SCNC: 16.8 MMOL/L (ref 5–15)
ANISOCYTOSIS BLD QL: NORMAL
AST SERPL-CCNC: 82 U/L (ref 1–32)
BASOPHILS # BLD AUTO: 0.03 10*3/MM3 (ref 0–0.2)
BASOPHILS NFR BLD AUTO: 0.2 % (ref 0–1.5)
BILIRUB CONJ SERPL-MCNC: 1.8 MG/DL (ref 0–0.3)
BILIRUB INDIRECT SERPL-MCNC: 0.5 MG/DL
BILIRUB SERPL-MCNC: 2.3 MG/DL (ref 0–1.2)
BUN SERPL-MCNC: 70.4 MG/DL (ref 8–23)
BUN/CREAT SERPL: 12.1 (ref 7–25)
BURR CELLS BLD QL SMEAR: NORMAL
CALCIUM SPEC-SCNC: 8 MG/DL (ref 8.6–10.5)
CHLORIDE SERPL-SCNC: 93 MMOL/L (ref 98–107)
CO2 SERPL-SCNC: 14.2 MMOL/L (ref 22–29)
CREAT SERPL-MCNC: 5.8 MG/DL (ref 0.57–1)
D-LACTATE SERPL-SCNC: 1.7 MMOL/L (ref 0.5–2)
DEPRECATED RDW RBC AUTO: 42.5 FL (ref 37–54)
EGFRCR SERPLBLD CKD-EPI 2021: 7.6 ML/MIN/1.73
EOSINOPHIL # BLD AUTO: 0 10*3/MM3 (ref 0–0.4)
EOSINOPHIL NFR BLD AUTO: 0 % (ref 0.3–6.2)
ERYTHROCYTE [DISTWIDTH] IN BLOOD BY AUTOMATED COUNT: 15.9 % (ref 12.3–15.4)
GLUCOSE BLDC GLUCOMTR-MCNC: 93 MG/DL (ref 70–99)
GLUCOSE SERPL-MCNC: 164 MG/DL (ref 65–99)
HCT VFR BLD AUTO: 25.7 % (ref 34–46.6)
HGB BLD-MCNC: 8.3 G/DL (ref 12–15.9)
IMM GRANULOCYTES # BLD AUTO: 0.38 10*3/MM3 (ref 0–0.05)
IMM GRANULOCYTES NFR BLD AUTO: 2.4 % (ref 0–0.5)
INR PPP: 1.3 (ref 0.86–1.15)
LARGE PLATELETS: NORMAL
LYMPHOCYTES # BLD AUTO: 0.38 10*3/MM3 (ref 0.7–3.1)
LYMPHOCYTES NFR BLD AUTO: 2.4 % (ref 19.6–45.3)
MAGNESIUM SERPL-MCNC: 1.8 MG/DL (ref 1.6–2.4)
MCH RBC QN AUTO: 23.8 PG (ref 26.6–33)
MCHC RBC AUTO-ENTMCNC: 32.3 G/DL (ref 31.5–35.7)
MCV RBC AUTO: 73.6 FL (ref 79–97)
MONOCYTES # BLD AUTO: 0.52 10*3/MM3 (ref 0.1–0.9)
MONOCYTES NFR BLD AUTO: 3.3 % (ref 5–12)
NEUTROPHILS NFR BLD AUTO: 14.4 10*3/MM3 (ref 1.7–7)
NEUTROPHILS NFR BLD AUTO: 91.7 % (ref 42.7–76)
NRBC BLD AUTO-RTO: 0 /100 WBC (ref 0–0.2)
PHOSPHATE SERPL-MCNC: 4.6 MG/DL (ref 2.5–4.5)
PLATELET # BLD AUTO: 247 10*3/MM3 (ref 140–450)
PMV BLD AUTO: 10.1 FL (ref 6–12)
POTASSIUM SERPL-SCNC: 4.2 MMOL/L (ref 3.5–5.2)
PROT SERPL-MCNC: 6.4 G/DL (ref 6–8.5)
PROTHROMBIN TIME: 16.8 SECONDS (ref 11.8–14.9)
RBC # BLD AUTO: 3.49 10*6/MM3 (ref 3.77–5.28)
SODIUM SERPL-SCNC: 124 MMOL/L (ref 136–145)
WBC MORPH BLD: NORMAL
WBC NRBC COR # BLD AUTO: 15.71 10*3/MM3 (ref 3.4–10.8)

## 2025-08-09 PROCEDURE — 83735 ASSAY OF MAGNESIUM: CPT | Performed by: STUDENT IN AN ORGANIZED HEALTH CARE EDUCATION/TRAINING PROGRAM

## 2025-08-09 PROCEDURE — 25010000002 HYDROCORTISONE SOD SUC (PF) 100 MG RECONSTITUTED SOLUTION: Performed by: INTERNAL MEDICINE

## 2025-08-09 PROCEDURE — 83605 ASSAY OF LACTIC ACID: CPT | Performed by: STUDENT IN AN ORGANIZED HEALTH CARE EDUCATION/TRAINING PROGRAM

## 2025-08-09 PROCEDURE — 25010000002 PIPERACILLIN SOD-TAZOBACTAM PER 1 G: Performed by: STUDENT IN AN ORGANIZED HEALTH CARE EDUCATION/TRAINING PROGRAM

## 2025-08-09 PROCEDURE — 84100 ASSAY OF PHOSPHORUS: CPT | Performed by: STUDENT IN AN ORGANIZED HEALTH CARE EDUCATION/TRAINING PROGRAM

## 2025-08-09 PROCEDURE — 99222 1ST HOSP IP/OBS MODERATE 55: CPT | Performed by: STUDENT IN AN ORGANIZED HEALTH CARE EDUCATION/TRAINING PROGRAM

## 2025-08-09 PROCEDURE — 82948 REAGENT STRIP/BLOOD GLUCOSE: CPT

## 2025-08-09 PROCEDURE — 71045 X-RAY EXAM CHEST 1 VIEW: CPT

## 2025-08-09 PROCEDURE — C1751 CATH, INF, PER/CENT/MIDLINE: HCPCS

## 2025-08-09 PROCEDURE — 85007 BL SMEAR W/DIFF WBC COUNT: CPT | Performed by: STUDENT IN AN ORGANIZED HEALTH CARE EDUCATION/TRAINING PROGRAM

## 2025-08-09 PROCEDURE — B548ZZA ULTRASONOGRAPHY OF SUPERIOR VENA CAVA, GUIDANCE: ICD-10-PCS | Performed by: INTERNAL MEDICINE

## 2025-08-09 PROCEDURE — 36556 INSERT NON-TUNNEL CV CATH: CPT | Performed by: INTERNAL MEDICINE

## 2025-08-09 PROCEDURE — 80048 BASIC METABOLIC PNL TOTAL CA: CPT | Performed by: STUDENT IN AN ORGANIZED HEALTH CARE EDUCATION/TRAINING PROGRAM

## 2025-08-09 PROCEDURE — 85610 PROTHROMBIN TIME: CPT | Performed by: STUDENT IN AN ORGANIZED HEALTH CARE EDUCATION/TRAINING PROGRAM

## 2025-08-09 PROCEDURE — 85025 COMPLETE CBC W/AUTO DIFF WBC: CPT | Performed by: STUDENT IN AN ORGANIZED HEALTH CARE EDUCATION/TRAINING PROGRAM

## 2025-08-09 PROCEDURE — 76937 US GUIDE VASCULAR ACCESS: CPT | Performed by: INTERNAL MEDICINE

## 2025-08-09 PROCEDURE — 99291 CRITICAL CARE FIRST HOUR: CPT | Performed by: INTERNAL MEDICINE

## 2025-08-09 PROCEDURE — 02HV33Z INSERTION OF INFUSION DEVICE INTO SUPERIOR VENA CAVA, PERCUTANEOUS APPROACH: ICD-10-PCS | Performed by: INTERNAL MEDICINE

## 2025-08-09 PROCEDURE — 80076 HEPATIC FUNCTION PANEL: CPT | Performed by: STUDENT IN AN ORGANIZED HEALTH CARE EDUCATION/TRAINING PROGRAM

## 2025-08-09 RX ORDER — NICOTINE POLACRILEX 4 MG
15 LOZENGE BUCCAL
Status: DISCONTINUED | OUTPATIENT
Start: 2025-08-09 | End: 2025-08-13

## 2025-08-09 RX ORDER — SODIUM CHLORIDE 0.9 % (FLUSH) 0.9 %
10 SYRINGE (ML) INJECTION EVERY 12 HOURS SCHEDULED
Status: DISCONTINUED | OUTPATIENT
Start: 2025-08-09 | End: 2025-08-16 | Stop reason: HOSPADM

## 2025-08-09 RX ORDER — IBUPROFEN 600 MG/1
1 TABLET ORAL
Status: DISCONTINUED | OUTPATIENT
Start: 2025-08-09 | End: 2025-08-13

## 2025-08-09 RX ORDER — DEXTROSE MONOHYDRATE 25 G/50ML
25 INJECTION, SOLUTION INTRAVENOUS
Status: DISCONTINUED | OUTPATIENT
Start: 2025-08-09 | End: 2025-08-13

## 2025-08-09 RX ORDER — SODIUM CHLORIDE 9 MG/ML
40 INJECTION, SOLUTION INTRAVENOUS AS NEEDED
Status: DISCONTINUED | OUTPATIENT
Start: 2025-08-09 | End: 2025-08-16 | Stop reason: HOSPADM

## 2025-08-09 RX ORDER — HYDROCORTISONE SODIUM SUCCINATE 100 MG/2ML
100 INJECTION INTRAMUSCULAR; INTRAVENOUS EVERY 8 HOURS
Status: DISCONTINUED | OUTPATIENT
Start: 2025-08-09 | End: 2025-08-12

## 2025-08-09 RX ORDER — SODIUM CHLORIDE 0.9 % (FLUSH) 0.9 %
10 SYRINGE (ML) INJECTION AS NEEDED
Status: DISCONTINUED | OUTPATIENT
Start: 2025-08-09 | End: 2025-08-16 | Stop reason: HOSPADM

## 2025-08-09 RX ADMIN — MUPIROCIN 1 APPLICATION: 20 OINTMENT TOPICAL at 17:50

## 2025-08-09 RX ADMIN — HYDROCORTISONE SODIUM SUCCINATE 100 MG: 100 INJECTION, POWDER, FOR SOLUTION INTRAMUSCULAR; INTRAVENOUS at 17:50

## 2025-08-09 RX ADMIN — PIPERACILLIN AND TAZOBACTAM 4.5 G: 4; .5 INJECTION, POWDER, FOR SOLUTION INTRAVENOUS at 18:38

## 2025-08-09 RX ADMIN — Medication 10 ML: at 20:00

## 2025-08-10 ENCOUNTER — APPOINTMENT (OUTPATIENT)
Dept: MRI IMAGING | Facility: HOSPITAL | Age: 65
DRG: 871 | End: 2025-08-10
Payer: MEDICARE

## 2025-08-10 LAB
ALBUMIN SERPL-MCNC: 2.8 G/DL (ref 3.5–5.2)
ALP SERPL-CCNC: 342 U/L (ref 39–117)
ALT SERPL W P-5'-P-CCNC: 109 U/L (ref 1–33)
ANION GAP SERPL CALCULATED.3IONS-SCNC: 18.7 MMOL/L (ref 5–15)
ANISOCYTOSIS BLD QL: NORMAL
AST SERPL-CCNC: 80 U/L (ref 1–32)
BACTERIA UR QL AUTO: ABNORMAL /HPF
BASOPHILS # BLD AUTO: 0.07 10*3/MM3 (ref 0–0.2)
BASOPHILS NFR BLD AUTO: 0.4 % (ref 0–1.5)
BILIRUB CONJ SERPL-MCNC: 1.2 MG/DL (ref 0–0.3)
BILIRUB INDIRECT SERPL-MCNC: 0.4 MG/DL
BILIRUB SERPL-MCNC: 1.6 MG/DL (ref 0–1.2)
BILIRUB UR QL STRIP: NEGATIVE
BUN SERPL-MCNC: 76.9 MG/DL (ref 8–23)
BUN/CREAT SERPL: 12.3 (ref 7–25)
BURR CELLS BLD QL SMEAR: NORMAL
CALCIUM SPEC-SCNC: 8.3 MG/DL (ref 8.6–10.5)
CHLORIDE SERPL-SCNC: 95 MMOL/L (ref 98–107)
CLARITY UR: ABNORMAL
CO2 SERPL-SCNC: 14.3 MMOL/L (ref 22–29)
COLOR UR: YELLOW
CREAT SERPL-MCNC: 6.24 MG/DL (ref 0.57–1)
DEPRECATED RDW RBC AUTO: 42.8 FL (ref 37–54)
EGFRCR SERPLBLD CKD-EPI 2021: 7 ML/MIN/1.73
EOSINOPHIL # BLD AUTO: 0 10*3/MM3 (ref 0–0.4)
EOSINOPHIL NFR BLD AUTO: 0 % (ref 0.3–6.2)
ERYTHROCYTE [DISTWIDTH] IN BLOOD BY AUTOMATED COUNT: 16.1 % (ref 12.3–15.4)
GLUCOSE BLDC GLUCOMTR-MCNC: 101 MG/DL (ref 70–99)
GLUCOSE BLDC GLUCOMTR-MCNC: 107 MG/DL (ref 70–99)
GLUCOSE BLDC GLUCOMTR-MCNC: 135 MG/DL (ref 70–99)
GLUCOSE BLDC GLUCOMTR-MCNC: 48 MG/DL (ref 70–99)
GLUCOSE BLDC GLUCOMTR-MCNC: 52 MG/DL (ref 70–99)
GLUCOSE BLDC GLUCOMTR-MCNC: 58 MG/DL (ref 70–99)
GLUCOSE BLDC GLUCOMTR-MCNC: 66 MG/DL (ref 70–99)
GLUCOSE BLDC GLUCOMTR-MCNC: 70 MG/DL (ref 70–99)
GLUCOSE BLDC GLUCOMTR-MCNC: 71 MG/DL (ref 70–99)
GLUCOSE BLDC GLUCOMTR-MCNC: 85 MG/DL (ref 70–99)
GLUCOSE BLDC GLUCOMTR-MCNC: 92 MG/DL (ref 70–99)
GLUCOSE BLDC GLUCOMTR-MCNC: 96 MG/DL (ref 70–99)
GLUCOSE SERPL-MCNC: 64 MG/DL (ref 65–99)
GLUCOSE UR STRIP-MCNC: NEGATIVE MG/DL
GRAN CASTS URNS QL MICRO: ABNORMAL /LPF
HBV SURFACE AG SERPL QL IA: NORMAL
HCT VFR BLD AUTO: 26.8 % (ref 34–46.6)
HGB BLD-MCNC: 8.5 G/DL (ref 12–15.9)
HGB UR QL STRIP.AUTO: ABNORMAL
HYALINE CASTS UR QL AUTO: ABNORMAL /LPF
IMM GRANULOCYTES # BLD AUTO: 0.39 10*3/MM3 (ref 0–0.05)
IMM GRANULOCYTES NFR BLD AUTO: 2.2 % (ref 0–0.5)
KETONES UR QL STRIP: NEGATIVE
LEUKOCYTE ESTERASE UR QL STRIP.AUTO: ABNORMAL
LYMPHOCYTES # BLD AUTO: 0.69 10*3/MM3 (ref 0.7–3.1)
LYMPHOCYTES NFR BLD AUTO: 3.9 % (ref 19.6–45.3)
MAGNESIUM SERPL-MCNC: 1.8 MG/DL (ref 1.6–2.4)
MCH RBC QN AUTO: 23.2 PG (ref 26.6–33)
MCHC RBC AUTO-ENTMCNC: 31.7 G/DL (ref 31.5–35.7)
MCV RBC AUTO: 73 FL (ref 79–97)
MICROCYTES BLD QL: NORMAL
MONOCYTES # BLD AUTO: 0.49 10*3/MM3 (ref 0.1–0.9)
MONOCYTES NFR BLD AUTO: 2.8 % (ref 5–12)
NEUTROPHILS NFR BLD AUTO: 16.16 10*3/MM3 (ref 1.7–7)
NEUTROPHILS NFR BLD AUTO: 90.7 % (ref 42.7–76)
NITRITE UR QL STRIP: NEGATIVE
NRBC BLD AUTO-RTO: 0 /100 WBC (ref 0–0.2)
PH UR STRIP.AUTO: 5.5 [PH] (ref 5–8)
PHOSPHATE SERPL-MCNC: 5.1 MG/DL (ref 2.5–4.5)
PLATELET # BLD AUTO: 300 10*3/MM3 (ref 140–450)
PMV BLD AUTO: 10.6 FL (ref 6–12)
POTASSIUM SERPL-SCNC: 4.4 MMOL/L (ref 3.5–5.2)
PROT SERPL-MCNC: 6.5 G/DL (ref 6–8.5)
PROT UR QL STRIP: ABNORMAL
RBC # BLD AUTO: 3.67 10*6/MM3 (ref 3.77–5.28)
RBC # UR STRIP: ABNORMAL /HPF
REF LAB TEST METHOD: ABNORMAL
SMALL PLATELETS BLD QL SMEAR: ADEQUATE
SODIUM SERPL-SCNC: 128 MMOL/L (ref 136–145)
SP GR UR STRIP: 1.02 (ref 1–1.03)
SQUAMOUS #/AREA URNS HPF: ABNORMAL /HPF
TRANS CELLS #/AREA URNS HPF: ABNORMAL /HPF
UROBILINOGEN UR QL STRIP: ABNORMAL
WBC # UR STRIP: ABNORMAL /HPF
WBC MORPH BLD: NORMAL
WBC NRBC COR # BLD AUTO: 17.8 10*3/MM3 (ref 3.4–10.8)

## 2025-08-10 PROCEDURE — 25010000002 DOXYCYCLINE 100 MG RECONSTITUTED SOLUTION 1 EACH VIAL: Performed by: NURSE PRACTITIONER

## 2025-08-10 PROCEDURE — 99232 SBSQ HOSP IP/OBS MODERATE 35: CPT | Performed by: STUDENT IN AN ORGANIZED HEALTH CARE EDUCATION/TRAINING PROGRAM

## 2025-08-10 PROCEDURE — 99291 CRITICAL CARE FIRST HOUR: CPT | Performed by: INTERNAL MEDICINE

## 2025-08-10 PROCEDURE — 87040 BLOOD CULTURE FOR BACTERIA: CPT | Performed by: STUDENT IN AN ORGANIZED HEALTH CARE EDUCATION/TRAINING PROGRAM

## 2025-08-10 PROCEDURE — 87798 DETECT AGENT NOS DNA AMP: CPT | Performed by: NURSE PRACTITIONER

## 2025-08-10 PROCEDURE — 25010000002 MIDAZOLAM PER 1MG: Performed by: INTERNAL MEDICINE

## 2025-08-10 PROCEDURE — 81001 URINALYSIS AUTO W/SCOPE: CPT | Performed by: STUDENT IN AN ORGANIZED HEALTH CARE EDUCATION/TRAINING PROGRAM

## 2025-08-10 PROCEDURE — 5A1D70Z PERFORMANCE OF URINARY FILTRATION, INTERMITTENT, LESS THAN 6 HOURS PER DAY: ICD-10-PCS | Performed by: STUDENT IN AN ORGANIZED HEALTH CARE EDUCATION/TRAINING PROGRAM

## 2025-08-10 PROCEDURE — 82948 REAGENT STRIP/BLOOD GLUCOSE: CPT

## 2025-08-10 PROCEDURE — 80048 BASIC METABOLIC PNL TOTAL CA: CPT | Performed by: STUDENT IN AN ORGANIZED HEALTH CARE EDUCATION/TRAINING PROGRAM

## 2025-08-10 PROCEDURE — 87468 ANAPLSMA PHGCYTOPHLM AMP PRB: CPT | Performed by: NURSE PRACTITIONER

## 2025-08-10 PROCEDURE — 87340 HEPATITIS B SURFACE AG IA: CPT | Performed by: STUDENT IN AN ORGANIZED HEALTH CARE EDUCATION/TRAINING PROGRAM

## 2025-08-10 PROCEDURE — 99222 1ST HOSP IP/OBS MODERATE 55: CPT | Performed by: INTERNAL MEDICINE

## 2025-08-10 PROCEDURE — 86618 LYME DISEASE ANTIBODY: CPT | Performed by: NURSE PRACTITIONER

## 2025-08-10 PROCEDURE — 86706 HEP B SURFACE ANTIBODY: CPT | Performed by: STUDENT IN AN ORGANIZED HEALTH CARE EDUCATION/TRAINING PROGRAM

## 2025-08-10 PROCEDURE — 74181 MRI ABDOMEN W/O CONTRAST: CPT

## 2025-08-10 PROCEDURE — 86704 HEP B CORE ANTIBODY TOTAL: CPT | Performed by: STUDENT IN AN ORGANIZED HEALTH CARE EDUCATION/TRAINING PROGRAM

## 2025-08-10 PROCEDURE — 25810000003 DEXTROSE 5 % AND SODIUM CHLORIDE 0.9 % 5-0.9 % SOLUTION: Performed by: INTERNAL MEDICINE

## 2025-08-10 PROCEDURE — 80076 HEPATIC FUNCTION PANEL: CPT | Performed by: STUDENT IN AN ORGANIZED HEALTH CARE EDUCATION/TRAINING PROGRAM

## 2025-08-10 PROCEDURE — 25010000002 PIPERACILLIN SOD-TAZOBACTAM PER 1 G: Performed by: STUDENT IN AN ORGANIZED HEALTH CARE EDUCATION/TRAINING PROGRAM

## 2025-08-10 PROCEDURE — 84100 ASSAY OF PHOSPHORUS: CPT | Performed by: STUDENT IN AN ORGANIZED HEALTH CARE EDUCATION/TRAINING PROGRAM

## 2025-08-10 PROCEDURE — 25010000002 MAGNESIUM SULFATE IN D5W 1G/100ML (PREMIX) 1-5 GM/100ML-% SOLUTION: Performed by: INTERNAL MEDICINE

## 2025-08-10 PROCEDURE — 25010000002 HEPARIN (PORCINE) PER 1000 UNITS: Performed by: STUDENT IN AN ORGANIZED HEALTH CARE EDUCATION/TRAINING PROGRAM

## 2025-08-10 PROCEDURE — 83735 ASSAY OF MAGNESIUM: CPT | Performed by: STUDENT IN AN ORGANIZED HEALTH CARE EDUCATION/TRAINING PROGRAM

## 2025-08-10 PROCEDURE — 82948 REAGENT STRIP/BLOOD GLUCOSE: CPT | Performed by: STUDENT IN AN ORGANIZED HEALTH CARE EDUCATION/TRAINING PROGRAM

## 2025-08-10 PROCEDURE — 85025 COMPLETE CBC W/AUTO DIFF WBC: CPT | Performed by: STUDENT IN AN ORGANIZED HEALTH CARE EDUCATION/TRAINING PROGRAM

## 2025-08-10 PROCEDURE — 99221 1ST HOSP IP/OBS SF/LOW 40: CPT | Performed by: SURGERY

## 2025-08-10 PROCEDURE — 25010000002 HYDROCORTISONE SOD SUC (PF) 100 MG RECONSTITUTED SOLUTION: Performed by: INTERNAL MEDICINE

## 2025-08-10 PROCEDURE — 87484 EHRLICHA CHAFFEENSIS AMP PRB: CPT | Performed by: NURSE PRACTITIONER

## 2025-08-10 PROCEDURE — 85007 BL SMEAR W/DIFF WBC COUNT: CPT | Performed by: STUDENT IN AN ORGANIZED HEALTH CARE EDUCATION/TRAINING PROGRAM

## 2025-08-10 RX ORDER — HEPARIN SODIUM 1000 [USP'U]/ML
2200 INJECTION, SOLUTION INTRAVENOUS; SUBCUTANEOUS AS NEEDED
Status: DISCONTINUED | OUTPATIENT
Start: 2025-08-10 | End: 2025-08-16 | Stop reason: HOSPADM

## 2025-08-10 RX ORDER — DEXTROSE MONOHYDRATE AND SODIUM CHLORIDE 5; .9 G/100ML; G/100ML
50 INJECTION, SOLUTION INTRAVENOUS CONTINUOUS
Status: DISCONTINUED | OUTPATIENT
Start: 2025-08-10 | End: 2025-08-11

## 2025-08-10 RX ORDER — MIDAZOLAM HYDROCHLORIDE 2 MG/2ML
2 INJECTION, SOLUTION INTRAMUSCULAR; INTRAVENOUS ONCE
Status: COMPLETED | OUTPATIENT
Start: 2025-08-10 | End: 2025-08-10

## 2025-08-10 RX ORDER — MAGNESIUM SULFATE 1 G/100ML
1 INJECTION INTRAVENOUS ONCE
Status: COMPLETED | OUTPATIENT
Start: 2025-08-10 | End: 2025-08-10

## 2025-08-10 RX ORDER — HEPARIN SODIUM 1000 [USP'U]/ML
2000 INJECTION, SOLUTION INTRAVENOUS; SUBCUTANEOUS ONCE
Status: COMPLETED | OUTPATIENT
Start: 2025-08-10 | End: 2025-08-10

## 2025-08-10 RX ADMIN — HYDROCORTISONE SODIUM SUCCINATE 100 MG: 100 INJECTION, POWDER, FOR SOLUTION INTRAMUSCULAR; INTRAVENOUS at 23:35

## 2025-08-10 RX ADMIN — DOXYCYCLINE 100 MG: 100 INJECTION, POWDER, LYOPHILIZED, FOR SOLUTION INTRAVENOUS at 23:36

## 2025-08-10 RX ADMIN — MUPIROCIN 1 APPLICATION: 20 OINTMENT TOPICAL at 08:44

## 2025-08-10 RX ADMIN — HYDROCORTISONE SODIUM SUCCINATE 100 MG: 100 INJECTION, POWDER, FOR SOLUTION INTRAMUSCULAR; INTRAVENOUS at 11:22

## 2025-08-10 RX ADMIN — PIPERACILLIN AND TAZOBACTAM 4.5 G: 4; .5 INJECTION, POWDER, FOR SOLUTION INTRAVENOUS at 02:44

## 2025-08-10 RX ADMIN — PIPERACILLIN AND TAZOBACTAM 4.5 G: 4; .5 INJECTION, POWDER, FOR SOLUTION INTRAVENOUS at 16:32

## 2025-08-10 RX ADMIN — MUPIROCIN 1 APPLICATION: 20 OINTMENT TOPICAL at 23:40

## 2025-08-10 RX ADMIN — HEPARIN SODIUM 2200 UNITS: 1000 INJECTION, SOLUTION INTRAVENOUS; SUBCUTANEOUS at 21:18

## 2025-08-10 RX ADMIN — Medication 10 ML: at 23:36

## 2025-08-10 RX ADMIN — MIDAZOLAM HYDROCHLORIDE 2 MG: 1 INJECTION, SOLUTION INTRAMUSCULAR; INTRAVENOUS at 12:00

## 2025-08-10 RX ADMIN — HYDROCORTISONE SODIUM SUCCINATE 100 MG: 100 INJECTION, POWDER, FOR SOLUTION INTRAMUSCULAR; INTRAVENOUS at 02:22

## 2025-08-10 RX ADMIN — HEPARIN SODIUM 2000 UNITS: 1000 INJECTION INTRAVENOUS; SUBCUTANEOUS at 19:05

## 2025-08-10 RX ADMIN — DEXTROSE AND SODIUM CHLORIDE 50 ML/HR: 5; 900 INJECTION, SOLUTION INTRAVENOUS at 13:49

## 2025-08-10 RX ADMIN — DEXTROSE MONOHYDRATE 25 G: 25 INJECTION, SOLUTION INTRAVENOUS at 05:33

## 2025-08-10 RX ADMIN — DOXYCYCLINE 100 MG: 100 INJECTION, POWDER, LYOPHILIZED, FOR SOLUTION INTRAVENOUS at 11:13

## 2025-08-10 RX ADMIN — DEXTROSE MONOHYDRATE 25 G: 25 INJECTION, SOLUTION INTRAVENOUS at 23:31

## 2025-08-10 RX ADMIN — DEXTROSE MONOHYDRATE 25 G: 25 INJECTION, SOLUTION INTRAVENOUS at 11:32

## 2025-08-10 RX ADMIN — DEXTROSE MONOHYDRATE 25 G: 25 INJECTION, SOLUTION INTRAVENOUS at 06:49

## 2025-08-10 RX ADMIN — Medication 10 ML: at 08:44

## 2025-08-10 RX ADMIN — MAGNESIUM SULFATE HEPTAHYDRATE 1 G: 1 INJECTION, SOLUTION INTRAVENOUS at 13:49

## 2025-08-11 ENCOUNTER — READMISSION MANAGEMENT (OUTPATIENT)
Dept: CALL CENTER | Facility: HOSPITAL | Age: 65
End: 2025-08-11
Payer: MEDICARE

## 2025-08-11 ENCOUNTER — ANESTHESIA (OUTPATIENT)
Dept: GASTROENTEROLOGY | Facility: HOSPITAL | Age: 65
End: 2025-08-11
Payer: MEDICARE

## 2025-08-11 ENCOUNTER — ANESTHESIA EVENT (OUTPATIENT)
Dept: GASTROENTEROLOGY | Facility: HOSPITAL | Age: 65
End: 2025-08-11
Payer: MEDICARE

## 2025-08-11 ENCOUNTER — APPOINTMENT (OUTPATIENT)
Dept: GENERAL RADIOLOGY | Facility: HOSPITAL | Age: 65
DRG: 871 | End: 2025-08-11
Payer: MEDICARE

## 2025-08-11 PROBLEM — K80.50 CHOLEDOCHOLITHIASIS: Status: ACTIVE | Noted: 2025-08-09

## 2025-08-11 PROBLEM — N17.0 ATN (ACUTE TUBULAR NECROSIS): Status: ACTIVE | Noted: 2025-08-11

## 2025-08-11 PROBLEM — N17.9 AKI (ACUTE KIDNEY INJURY): Status: ACTIVE | Noted: 2025-08-11

## 2025-08-11 LAB
ALBUMIN SERPL-MCNC: 2.7 G/DL (ref 3.5–5.2)
ALP SERPL-CCNC: 324 U/L (ref 39–117)
ALT SERPL W P-5'-P-CCNC: 87 U/L (ref 1–33)
ANION GAP SERPL CALCULATED.3IONS-SCNC: 17.3 MMOL/L (ref 5–15)
AST SERPL-CCNC: 60 U/L (ref 1–32)
BASOPHILS # BLD AUTO: 0.02 10*3/MM3 (ref 0–0.2)
BASOPHILS NFR BLD AUTO: 0.1 % (ref 0–1.5)
BILIRUB CONJ SERPL-MCNC: 0.6 MG/DL (ref 0–0.3)
BILIRUB INDIRECT SERPL-MCNC: 0.4 MG/DL
BILIRUB SERPL-MCNC: 1 MG/DL (ref 0–1.2)
BUN SERPL-MCNC: 67.5 MG/DL (ref 8–23)
BUN/CREAT SERPL: 12.9 (ref 7–25)
CALCIUM SPEC-SCNC: 8.3 MG/DL (ref 8.6–10.5)
CHLORIDE SERPL-SCNC: 97 MMOL/L (ref 98–107)
CO2 SERPL-SCNC: 15.7 MMOL/L (ref 22–29)
CREAT SERPL-MCNC: 5.22 MG/DL (ref 0.57–1)
DEPRECATED RDW RBC AUTO: 41 FL (ref 37–54)
EGFRCR SERPLBLD CKD-EPI 2021: 8.7 ML/MIN/1.73
EOSINOPHIL # BLD AUTO: 0.01 10*3/MM3 (ref 0–0.4)
EOSINOPHIL NFR BLD AUTO: 0.1 % (ref 0.3–6.2)
ERYTHROCYTE [DISTWIDTH] IN BLOOD BY AUTOMATED COUNT: 15.9 % (ref 12.3–15.4)
GLUCOSE BLDC GLUCOMTR-MCNC: 168 MG/DL (ref 70–99)
GLUCOSE BLDC GLUCOMTR-MCNC: 85 MG/DL (ref 70–99)
GLUCOSE BLDC GLUCOMTR-MCNC: 94 MG/DL (ref 70–99)
GLUCOSE BLDC GLUCOMTR-MCNC: 94 MG/DL (ref 70–99)
GLUCOSE BLDC GLUCOMTR-MCNC: 97 MG/DL (ref 70–99)
GLUCOSE SERPL-MCNC: 93 MG/DL (ref 65–99)
HBV SURFACE AB SER RIA-ACNC: NORMAL
HCT VFR BLD AUTO: 25.9 % (ref 34–46.6)
HGB BLD-MCNC: 8.6 G/DL (ref 12–15.9)
IMM GRANULOCYTES # BLD AUTO: 0.4 10*3/MM3 (ref 0–0.05)
IMM GRANULOCYTES NFR BLD AUTO: 3 % (ref 0–0.5)
LYMPHOCYTES # BLD AUTO: 0.69 10*3/MM3 (ref 0.7–3.1)
LYMPHOCYTES NFR BLD AUTO: 5.2 % (ref 19.6–45.3)
MAGNESIUM SERPL-MCNC: 2 MG/DL (ref 1.6–2.4)
MCH RBC QN AUTO: 23.6 PG (ref 26.6–33)
MCHC RBC AUTO-ENTMCNC: 33.2 G/DL (ref 31.5–35.7)
MCV RBC AUTO: 71 FL (ref 79–97)
MONOCYTES # BLD AUTO: 0.38 10*3/MM3 (ref 0.1–0.9)
MONOCYTES NFR BLD AUTO: 2.8 % (ref 5–12)
NEUTROPHILS NFR BLD AUTO: 11.85 10*3/MM3 (ref 1.7–7)
NEUTROPHILS NFR BLD AUTO: 88.8 % (ref 42.7–76)
NRBC BLD AUTO-RTO: 0 /100 WBC (ref 0–0.2)
PHOSPHATE SERPL-MCNC: 5.3 MG/DL (ref 2.5–4.5)
PLATELET # BLD AUTO: 317 10*3/MM3 (ref 140–450)
PMV BLD AUTO: 9.9 FL (ref 6–12)
POTASSIUM SERPL-SCNC: 4 MMOL/L (ref 3.5–5.2)
PROT SERPL-MCNC: 6.4 G/DL (ref 6–8.5)
RBC # BLD AUTO: 3.65 10*6/MM3 (ref 3.77–5.28)
SODIUM SERPL-SCNC: 130 MMOL/L (ref 136–145)
WBC NRBC COR # BLD AUTO: 13.35 10*3/MM3 (ref 3.4–10.8)

## 2025-08-11 PROCEDURE — 25010000002 PIPERACILLIN SOD-TAZOBACTAM PER 1 G: Performed by: STUDENT IN AN ORGANIZED HEALTH CARE EDUCATION/TRAINING PROGRAM

## 2025-08-11 PROCEDURE — 25010000003 DEXTROSE 5 % SOLUTION: Performed by: STUDENT IN AN ORGANIZED HEALTH CARE EDUCATION/TRAINING PROGRAM

## 2025-08-11 PROCEDURE — 25010000002 PROPOFOL 10 MG/ML EMULSION: Performed by: NURSE ANESTHETIST, CERTIFIED REGISTERED

## 2025-08-11 PROCEDURE — 80076 HEPATIC FUNCTION PANEL: CPT | Performed by: STUDENT IN AN ORGANIZED HEALTH CARE EDUCATION/TRAINING PROGRAM

## 2025-08-11 PROCEDURE — 80048 BASIC METABOLIC PNL TOTAL CA: CPT | Performed by: STUDENT IN AN ORGANIZED HEALTH CARE EDUCATION/TRAINING PROGRAM

## 2025-08-11 PROCEDURE — 25010000002 LIDOCAINE PF 2% 2 % SOLUTION: Performed by: NURSE ANESTHETIST, CERTIFIED REGISTERED

## 2025-08-11 PROCEDURE — C1889 IMPLANT/INSERT DEVICE, NOC: HCPCS | Performed by: INTERNAL MEDICINE

## 2025-08-11 PROCEDURE — 0FC98ZZ EXTIRPATION OF MATTER FROM COMMON BILE DUCT, VIA NATURAL OR ARTIFICIAL OPENING ENDOSCOPIC: ICD-10-PCS | Performed by: INTERNAL MEDICINE

## 2025-08-11 PROCEDURE — 25010000002 SUGAMMADEX 200 MG/2ML SOLUTION

## 2025-08-11 PROCEDURE — 25010000002 PIPERACILLIN SOD-TAZOBACTAM PER 1 G: Performed by: INTERNAL MEDICINE

## 2025-08-11 PROCEDURE — 85025 COMPLETE CBC W/AUTO DIFF WBC: CPT | Performed by: STUDENT IN AN ORGANIZED HEALTH CARE EDUCATION/TRAINING PROGRAM

## 2025-08-11 PROCEDURE — 43274 ERCP DUCT STENT PLACEMENT: CPT | Performed by: INTERNAL MEDICINE

## 2025-08-11 PROCEDURE — 25010000002 GLUCAGON (RDNA) PER 1 MG: Performed by: STUDENT IN AN ORGANIZED HEALTH CARE EDUCATION/TRAINING PROGRAM

## 2025-08-11 PROCEDURE — 76000 FLUOROSCOPY <1 HR PHYS/QHP: CPT

## 2025-08-11 PROCEDURE — 0F798DZ DILATION OF COMMON BILE DUCT WITH INTRALUMINAL DEVICE, VIA NATURAL OR ARTIFICIAL OPENING ENDOSCOPIC: ICD-10-PCS | Performed by: INTERNAL MEDICINE

## 2025-08-11 PROCEDURE — 25010000002 ONDANSETRON PER 1 MG: Performed by: NURSE ANESTHETIST, CERTIFIED REGISTERED

## 2025-08-11 PROCEDURE — 99232 SBSQ HOSP IP/OBS MODERATE 35: CPT | Performed by: STUDENT IN AN ORGANIZED HEALTH CARE EDUCATION/TRAINING PROGRAM

## 2025-08-11 PROCEDURE — 25010000002 DEXAMETHASONE PER 1 MG: Performed by: NURSE ANESTHETIST, CERTIFIED REGISTERED

## 2025-08-11 PROCEDURE — 94799 UNLISTED PULMONARY SVC/PX: CPT

## 2025-08-11 PROCEDURE — 25810000003 LACTATED RINGERS PER 1000 ML: Performed by: NURSE ANESTHETIST, CERTIFIED REGISTERED

## 2025-08-11 PROCEDURE — C1769 GUIDE WIRE: HCPCS | Performed by: INTERNAL MEDICINE

## 2025-08-11 PROCEDURE — 82948 REAGENT STRIP/BLOOD GLUCOSE: CPT

## 2025-08-11 PROCEDURE — 84100 ASSAY OF PHOSPHORUS: CPT | Performed by: STUDENT IN AN ORGANIZED HEALTH CARE EDUCATION/TRAINING PROGRAM

## 2025-08-11 PROCEDURE — 94640 AIRWAY INHALATION TREATMENT: CPT

## 2025-08-11 PROCEDURE — 25010000002 HYDROCORTISONE SOD SUC (PF) 100 MG RECONSTITUTED SOLUTION: Performed by: INTERNAL MEDICINE

## 2025-08-11 PROCEDURE — C2617 STENT, NON-COR, TEM W/O DEL: HCPCS | Performed by: INTERNAL MEDICINE

## 2025-08-11 PROCEDURE — 43273 ENDOSCOPIC PANCREATOSCOPY: CPT | Performed by: INTERNAL MEDICINE

## 2025-08-11 PROCEDURE — 25510000001 IOPAMIDOL 61 % SOLUTION: Performed by: INTERNAL MEDICINE

## 2025-08-11 PROCEDURE — 25010000002 DOXYCYCLINE 100 MG RECONSTITUTED SOLUTION 1 EACH VIAL: Performed by: INTERNAL MEDICINE

## 2025-08-11 PROCEDURE — 71045 X-RAY EXAM CHEST 1 VIEW: CPT

## 2025-08-11 PROCEDURE — 25010000002 HEPARIN (PORCINE) PER 1000 UNITS: Performed by: INTERNAL MEDICINE

## 2025-08-11 PROCEDURE — 83735 ASSAY OF MAGNESIUM: CPT | Performed by: STUDENT IN AN ORGANIZED HEALTH CARE EDUCATION/TRAINING PROGRAM

## 2025-08-11 PROCEDURE — 0FF98ZZ FRAGMENTATION IN COMMON BILE DUCT, VIA NATURAL OR ARTIFICIAL OPENING ENDOSCOPIC: ICD-10-PCS | Performed by: INTERNAL MEDICINE

## 2025-08-11 PROCEDURE — 43265 ERCP LITHOTRIPSY CALCULI: CPT | Performed by: INTERNAL MEDICINE

## 2025-08-11 PROCEDURE — 25010000002 DOXYCYCLINE 100 MG RECONSTITUTED SOLUTION 1 EACH VIAL: Performed by: STUDENT IN AN ORGANIZED HEALTH CARE EDUCATION/TRAINING PROGRAM

## 2025-08-11 DEVICE — BILIARY STENT
Type: IMPLANTABLE DEVICE | Site: BILE DUCT | Status: FUNCTIONAL
Brand: ADVANIX™ BILIARY

## 2025-08-11 RX ORDER — ALBUTEROL SULFATE 0.83 MG/ML
2.5 SOLUTION RESPIRATORY (INHALATION) EVERY 4 HOURS PRN
Status: DISCONTINUED | OUTPATIENT
Start: 2025-08-11 | End: 2025-08-16 | Stop reason: HOSPADM

## 2025-08-11 RX ORDER — SODIUM CHLORIDE 9 MG/ML
150 INJECTION, SOLUTION INTRAVENOUS CONTINUOUS
Status: DISCONTINUED | OUTPATIENT
Start: 2025-08-11 | End: 2025-08-11

## 2025-08-11 RX ORDER — DEXAMETHASONE SODIUM PHOSPHATE 4 MG/ML
INJECTION, SOLUTION INTRA-ARTICULAR; INTRALESIONAL; INTRAMUSCULAR; INTRAVENOUS; SOFT TISSUE AS NEEDED
Status: DISCONTINUED | OUTPATIENT
Start: 2025-08-11 | End: 2025-08-11 | Stop reason: SURG

## 2025-08-11 RX ORDER — LIDOCAINE HYDROCHLORIDE 20 MG/ML
INJECTION, SOLUTION EPIDURAL; INFILTRATION; INTRACAUDAL; PERINEURAL AS NEEDED
Status: DISCONTINUED | OUTPATIENT
Start: 2025-08-11 | End: 2025-08-11 | Stop reason: SURG

## 2025-08-11 RX ORDER — SODIUM CHLORIDE, SODIUM LACTATE, POTASSIUM CHLORIDE, CALCIUM CHLORIDE 600; 310; 30; 20 MG/100ML; MG/100ML; MG/100ML; MG/100ML
INJECTION, SOLUTION INTRAVENOUS CONTINUOUS PRN
Status: DISCONTINUED | OUTPATIENT
Start: 2025-08-11 | End: 2025-08-11 | Stop reason: SURG

## 2025-08-11 RX ORDER — ACETAMINOPHEN 10 MG/ML
1000 INJECTION, SOLUTION INTRAVENOUS ONCE
Status: DISCONTINUED | OUTPATIENT
Start: 2025-08-11 | End: 2025-08-16 | Stop reason: HOSPADM

## 2025-08-11 RX ORDER — PROPOFOL 10 MG/ML
VIAL (ML) INTRAVENOUS AS NEEDED
Status: DISCONTINUED | OUTPATIENT
Start: 2025-08-11 | End: 2025-08-11 | Stop reason: SURG

## 2025-08-11 RX ORDER — ONDANSETRON 2 MG/ML
INJECTION INTRAMUSCULAR; INTRAVENOUS AS NEEDED
Status: DISCONTINUED | OUTPATIENT
Start: 2025-08-11 | End: 2025-08-11 | Stop reason: SURG

## 2025-08-11 RX ORDER — EPHEDRINE SULFATE 50 MG/ML
INJECTION INTRAVENOUS AS NEEDED
Status: DISCONTINUED | OUTPATIENT
Start: 2025-08-11 | End: 2025-08-11 | Stop reason: SURG

## 2025-08-11 RX ORDER — ROCURONIUM BROMIDE 10 MG/ML
INJECTION, SOLUTION INTRAVENOUS AS NEEDED
Status: DISCONTINUED | OUTPATIENT
Start: 2025-08-11 | End: 2025-08-11 | Stop reason: SURG

## 2025-08-11 RX ORDER — NYSTATIN 100000 [USP'U]/ML
5 SUSPENSION ORAL 4 TIMES DAILY
Status: ACTIVE | OUTPATIENT
Start: 2025-08-11 | End: 2025-08-15

## 2025-08-11 RX ORDER — IOPAMIDOL 612 MG/ML
INJECTION, SOLUTION INTRATHECAL AS NEEDED
Status: DISCONTINUED | OUTPATIENT
Start: 2025-08-11 | End: 2025-08-11 | Stop reason: HOSPADM

## 2025-08-11 RX ADMIN — ROCURONIUM BROMIDE 80 MG: 10 INJECTION, SOLUTION INTRAVENOUS at 13:28

## 2025-08-11 RX ADMIN — NYSTATIN 500000 UNITS: 100000 SUSPENSION ORAL at 23:12

## 2025-08-11 RX ADMIN — LIDOCAINE HYDROCHLORIDE 60 MG: 20 INJECTION, SOLUTION EPIDURAL; INFILTRATION; INTRACAUDAL; PERINEURAL at 13:28

## 2025-08-11 RX ADMIN — DEXAMETHASONE SODIUM PHOSPHATE 4 MG: 4 INJECTION, SOLUTION INTRAMUSCULAR; INTRAVENOUS at 13:31

## 2025-08-11 RX ADMIN — Medication 10 ML: at 08:39

## 2025-08-11 RX ADMIN — EPHEDRINE SULFATE 10 MG: 50 INJECTION INTRAVENOUS at 14:58

## 2025-08-11 RX ADMIN — MUPIROCIN 1 APPLICATION: 20 OINTMENT TOPICAL at 23:12

## 2025-08-11 RX ADMIN — Medication 10 ML: at 23:16

## 2025-08-11 RX ADMIN — LIDOCAINE HYDROCHLORIDE 20 MG: 20 INJECTION, SOLUTION EPIDURAL; INFILTRATION; INTRACAUDAL; PERINEURAL at 14:30

## 2025-08-11 RX ADMIN — EPHEDRINE SULFATE 10 MG: 50 INJECTION INTRAVENOUS at 13:54

## 2025-08-11 RX ADMIN — DOXYCYCLINE 100 MG: 100 INJECTION, POWDER, LYOPHILIZED, FOR SOLUTION INTRAVENOUS at 23:13

## 2025-08-11 RX ADMIN — PIPERACILLIN AND TAZOBACTAM 4.5 G: 4; .5 INJECTION, POWDER, FOR SOLUTION INTRAVENOUS at 16:38

## 2025-08-11 RX ADMIN — SODIUM BICARBONATE 150 MEQ: 84 INJECTION INTRAVENOUS at 10:12

## 2025-08-11 RX ADMIN — PIPERACILLIN AND TAZOBACTAM 4.5 G: 4; .5 INJECTION, POWDER, FOR SOLUTION INTRAVENOUS at 02:40

## 2025-08-11 RX ADMIN — NYSTATIN 500000 UNITS: 100000 SUSPENSION ORAL at 17:01

## 2025-08-11 RX ADMIN — SUGAMMADEX 350 MG: 100 INJECTION, SOLUTION INTRAVENOUS at 15:17

## 2025-08-11 RX ADMIN — SODIUM CHLORIDE, POTASSIUM CHLORIDE, SODIUM LACTATE AND CALCIUM CHLORIDE: 600; 310; 30; 20 INJECTION, SOLUTION INTRAVENOUS at 14:42

## 2025-08-11 RX ADMIN — MUPIROCIN 1 APPLICATION: 20 OINTMENT TOPICAL at 08:38

## 2025-08-11 RX ADMIN — HEPARIN SODIUM 2200 UNITS: 1000 INJECTION, SOLUTION INTRAVENOUS; SUBCUTANEOUS at 19:01

## 2025-08-11 RX ADMIN — PROPOFOL 150 MG: 10 INJECTION, EMULSION INTRAVENOUS at 13:28

## 2025-08-11 RX ADMIN — PROPOFOL 200 MCG/KG/MIN: 10 INJECTION, EMULSION INTRAVENOUS at 13:32

## 2025-08-11 RX ADMIN — Medication 0.5 MG: at 14:52

## 2025-08-11 RX ADMIN — ALBUTEROL SULFATE 2.5 MG: 2.5 SOLUTION RESPIRATORY (INHALATION) at 16:45

## 2025-08-11 RX ADMIN — Medication 0.5 MG: at 13:56

## 2025-08-11 RX ADMIN — ONDANSETRON 4 MG: 2 INJECTION, SOLUTION INTRAMUSCULAR; INTRAVENOUS at 13:36

## 2025-08-11 RX ADMIN — LIDOCAINE HYDROCHLORIDE 20 MG: 20 INJECTION, SOLUTION EPIDURAL; INFILTRATION; INTRACAUDAL; PERINEURAL at 15:19

## 2025-08-11 RX ADMIN — DOXYCYCLINE 100 MG: 100 INJECTION, POWDER, LYOPHILIZED, FOR SOLUTION INTRAVENOUS at 13:45

## 2025-08-11 RX ADMIN — HYDROCORTISONE SODIUM SUCCINATE 100 MG: 100 INJECTION, POWDER, FOR SOLUTION INTRAMUSCULAR; INTRAVENOUS at 08:38

## 2025-08-11 RX ADMIN — HYDROCORTISONE SODIUM SUCCINATE 100 MG: 100 INJECTION, POWDER, FOR SOLUTION INTRAMUSCULAR; INTRAVENOUS at 16:51

## 2025-08-11 RX ADMIN — SODIUM CHLORIDE, POTASSIUM CHLORIDE, SODIUM LACTATE AND CALCIUM CHLORIDE: 600; 310; 30; 20 INJECTION, SOLUTION INTRAVENOUS at 13:24

## 2025-08-12 ENCOUNTER — TELEPHONE (OUTPATIENT)
Dept: GASTROENTEROLOGY | Facility: CLINIC | Age: 65
End: 2025-08-12
Payer: MEDICARE

## 2025-08-12 LAB
ANION GAP SERPL CALCULATED.3IONS-SCNC: 16.2 MMOL/L (ref 5–15)
B BURGDOR IGG+IGM SER QL IA: NEGATIVE
BASOPHILS # BLD AUTO: 0.02 10*3/MM3 (ref 0–0.2)
BASOPHILS NFR BLD AUTO: 0.2 % (ref 0–1.5)
BUN SERPL-MCNC: 48.9 MG/DL (ref 8–23)
BUN/CREAT SERPL: 12.7 (ref 7–25)
CALCIUM SPEC-SCNC: 8.7 MG/DL (ref 8.6–10.5)
CHLORIDE SERPL-SCNC: 97 MMOL/L (ref 98–107)
CO2 SERPL-SCNC: 19.8 MMOL/L (ref 22–29)
CREAT SERPL-MCNC: 3.86 MG/DL (ref 0.57–1)
DEPRECATED RDW RBC AUTO: 42.5 FL (ref 37–54)
EGFRCR SERPLBLD CKD-EPI 2021: 12.5 ML/MIN/1.73
EOSINOPHIL # BLD AUTO: 0.01 10*3/MM3 (ref 0–0.4)
EOSINOPHIL NFR BLD AUTO: 0.1 % (ref 0.3–6.2)
ERYTHROCYTE [DISTWIDTH] IN BLOOD BY AUTOMATED COUNT: 16.3 % (ref 12.3–15.4)
GLUCOSE BLDC GLUCOMTR-MCNC: 104 MG/DL (ref 70–99)
GLUCOSE BLDC GLUCOMTR-MCNC: 134 MG/DL (ref 70–99)
GLUCOSE BLDC GLUCOMTR-MCNC: 141 MG/DL (ref 70–99)
GLUCOSE BLDC GLUCOMTR-MCNC: 143 MG/DL (ref 70–99)
GLUCOSE SERPL-MCNC: 134 MG/DL (ref 65–99)
HBV CORE AB SERPL QL IA: NEGATIVE
HCT VFR BLD AUTO: 27.4 % (ref 34–46.6)
HGB BLD-MCNC: 8.7 G/DL (ref 12–15.9)
IMM GRANULOCYTES # BLD AUTO: 0.28 10*3/MM3 (ref 0–0.05)
IMM GRANULOCYTES NFR BLD AUTO: 2.5 % (ref 0–0.5)
LIPASE SERPL-CCNC: 173 U/L (ref 13–60)
LYMPHOCYTES # BLD AUTO: 0.72 10*3/MM3 (ref 0.7–3.1)
LYMPHOCYTES NFR BLD AUTO: 6.4 % (ref 19.6–45.3)
MAGNESIUM SERPL-MCNC: 1.8 MG/DL (ref 1.6–2.4)
MCH RBC QN AUTO: 22.7 PG (ref 26.6–33)
MCHC RBC AUTO-ENTMCNC: 31.8 G/DL (ref 31.5–35.7)
MCV RBC AUTO: 71.5 FL (ref 79–97)
MONOCYTES # BLD AUTO: 0.67 10*3/MM3 (ref 0.1–0.9)
MONOCYTES NFR BLD AUTO: 5.9 % (ref 5–12)
NEUTROPHILS NFR BLD AUTO: 84.9 % (ref 42.7–76)
NEUTROPHILS NFR BLD AUTO: 9.61 10*3/MM3 (ref 1.7–7)
NRBC BLD AUTO-RTO: 0 /100 WBC (ref 0–0.2)
PHOSPHATE SERPL-MCNC: 5.1 MG/DL (ref 2.5–4.5)
PLATELET # BLD AUTO: 352 10*3/MM3 (ref 140–450)
PMV BLD AUTO: 9.5 FL (ref 6–12)
POTASSIUM SERPL-SCNC: 3.5 MMOL/L (ref 3.5–5.2)
RBC # BLD AUTO: 3.83 10*6/MM3 (ref 3.77–5.28)
SODIUM SERPL-SCNC: 133 MMOL/L (ref 136–145)
WBC NRBC COR # BLD AUTO: 11.31 10*3/MM3 (ref 3.4–10.8)

## 2025-08-12 PROCEDURE — 82948 REAGENT STRIP/BLOOD GLUCOSE: CPT

## 2025-08-12 PROCEDURE — 85025 COMPLETE CBC W/AUTO DIFF WBC: CPT | Performed by: INTERNAL MEDICINE

## 2025-08-12 PROCEDURE — 99232 SBSQ HOSP IP/OBS MODERATE 35: CPT

## 2025-08-12 PROCEDURE — 25010000002 HYDROCORTISONE SOD SUC (PF) 100 MG RECONSTITUTED SOLUTION: Performed by: INTERNAL MEDICINE

## 2025-08-12 PROCEDURE — 80048 BASIC METABOLIC PNL TOTAL CA: CPT | Performed by: INTERNAL MEDICINE

## 2025-08-12 PROCEDURE — 25010000002 PIPERACILLIN SOD-TAZOBACTAM PER 1 G: Performed by: INTERNAL MEDICINE

## 2025-08-12 PROCEDURE — 25010000002 DOXYCYCLINE 100 MG RECONSTITUTED SOLUTION 1 EACH VIAL: Performed by: INTERNAL MEDICINE

## 2025-08-12 PROCEDURE — 82948 REAGENT STRIP/BLOOD GLUCOSE: CPT | Performed by: INTERNAL MEDICINE

## 2025-08-12 PROCEDURE — 97165 OT EVAL LOW COMPLEX 30 MIN: CPT

## 2025-08-12 PROCEDURE — 83735 ASSAY OF MAGNESIUM: CPT | Performed by: INTERNAL MEDICINE

## 2025-08-12 PROCEDURE — 99231 SBSQ HOSP IP/OBS SF/LOW 25: CPT | Performed by: SURGERY

## 2025-08-12 PROCEDURE — 25010000002 HYDROCORTISONE SOD SUC (PF) 100 MG RECONSTITUTED SOLUTION: Performed by: STUDENT IN AN ORGANIZED HEALTH CARE EDUCATION/TRAINING PROGRAM

## 2025-08-12 PROCEDURE — 83690 ASSAY OF LIPASE: CPT | Performed by: STUDENT IN AN ORGANIZED HEALTH CARE EDUCATION/TRAINING PROGRAM

## 2025-08-12 PROCEDURE — 84100 ASSAY OF PHOSPHORUS: CPT | Performed by: INTERNAL MEDICINE

## 2025-08-12 PROCEDURE — 94799 UNLISTED PULMONARY SVC/PX: CPT

## 2025-08-12 PROCEDURE — 97161 PT EVAL LOW COMPLEX 20 MIN: CPT

## 2025-08-12 PROCEDURE — 99232 SBSQ HOSP IP/OBS MODERATE 35: CPT | Performed by: STUDENT IN AN ORGANIZED HEALTH CARE EDUCATION/TRAINING PROGRAM

## 2025-08-12 RX ORDER — HYDROCORTISONE SODIUM SUCCINATE 100 MG/2ML
50 INJECTION INTRAMUSCULAR; INTRAVENOUS EVERY 8 HOURS
Status: DISCONTINUED | OUTPATIENT
Start: 2025-08-12 | End: 2025-08-13

## 2025-08-12 RX ADMIN — DOXYCYCLINE 100 MG: 100 INJECTION, POWDER, LYOPHILIZED, FOR SOLUTION INTRAVENOUS at 11:59

## 2025-08-12 RX ADMIN — NYSTATIN 500000 UNITS: 100000 SUSPENSION ORAL at 18:08

## 2025-08-12 RX ADMIN — NYSTATIN 500000 UNITS: 100000 SUSPENSION ORAL at 12:07

## 2025-08-12 RX ADMIN — Medication 10 ML: at 20:01

## 2025-08-12 RX ADMIN — Medication 10 ML: at 09:18

## 2025-08-12 RX ADMIN — MUPIROCIN 1 APPLICATION: 20 OINTMENT TOPICAL at 20:01

## 2025-08-12 RX ADMIN — DOXYCYCLINE 100 MG: 100 INJECTION, POWDER, LYOPHILIZED, FOR SOLUTION INTRAVENOUS at 23:01

## 2025-08-12 RX ADMIN — HYDROCORTISONE SODIUM SUCCINATE 50 MG: 100 INJECTION, POWDER, FOR SOLUTION INTRAMUSCULAR; INTRAVENOUS at 16:58

## 2025-08-12 RX ADMIN — HYDROCORTISONE SODIUM SUCCINATE 100 MG: 100 INJECTION, POWDER, FOR SOLUTION INTRAMUSCULAR; INTRAVENOUS at 00:17

## 2025-08-12 RX ADMIN — PIPERACILLIN AND TAZOBACTAM 4.5 G: 4; .5 INJECTION, POWDER, FOR SOLUTION INTRAVENOUS at 16:58

## 2025-08-12 RX ADMIN — NYSTATIN 500000 UNITS: 100000 SUSPENSION ORAL at 20:01

## 2025-08-12 RX ADMIN — MUPIROCIN 1 APPLICATION: 20 OINTMENT TOPICAL at 09:15

## 2025-08-12 RX ADMIN — PIPERACILLIN AND TAZOBACTAM 4.5 G: 4; .5 INJECTION, POWDER, FOR SOLUTION INTRAVENOUS at 04:04

## 2025-08-12 RX ADMIN — NYSTATIN 500000 UNITS: 100000 SUSPENSION ORAL at 09:15

## 2025-08-13 ENCOUNTER — TELEPHONE (OUTPATIENT)
Dept: GASTROENTEROLOGY | Facility: CLINIC | Age: 65
End: 2025-08-13
Payer: MEDICARE

## 2025-08-13 DIAGNOSIS — K80.50 CHOLEDOCHOLITHIASIS: Primary | ICD-10-CM

## 2025-08-13 DIAGNOSIS — K81.9 CHOLECYSTITIS: ICD-10-CM

## 2025-08-13 LAB
ANION GAP SERPL CALCULATED.3IONS-SCNC: 16 MMOL/L (ref 5–15)
ANISOCYTOSIS BLD QL: NORMAL
BASOPHILS # BLD AUTO: 0.02 10*3/MM3 (ref 0–0.2)
BASOPHILS NFR BLD AUTO: 0.2 % (ref 0–1.5)
BUN SERPL-MCNC: 58.8 MG/DL (ref 8–23)
BUN/CREAT SERPL: 13.3 (ref 7–25)
CALCIUM SPEC-SCNC: 8.4 MG/DL (ref 8.6–10.5)
CHLORIDE SERPL-SCNC: 102 MMOL/L (ref 98–107)
CO2 SERPL-SCNC: 21 MMOL/L (ref 22–29)
CREAT SERPL-MCNC: 4.41 MG/DL (ref 0.57–1)
DEPRECATED RDW RBC AUTO: 42.8 FL (ref 37–54)
EGFRCR SERPLBLD CKD-EPI 2021: 10.6 ML/MIN/1.73
EOSINOPHIL # BLD AUTO: 0.12 10*3/MM3 (ref 0–0.4)
EOSINOPHIL NFR BLD AUTO: 1 % (ref 0.3–6.2)
ERYTHROCYTE [DISTWIDTH] IN BLOOD BY AUTOMATED COUNT: 16.1 % (ref 12.3–15.4)
GLUCOSE BLDC GLUCOMTR-MCNC: 102 MG/DL (ref 70–99)
GLUCOSE BLDC GLUCOMTR-MCNC: 108 MG/DL (ref 70–99)
GLUCOSE BLDC GLUCOMTR-MCNC: 117 MG/DL (ref 70–99)
GLUCOSE BLDC GLUCOMTR-MCNC: 138 MG/DL (ref 70–99)
GLUCOSE BLDC GLUCOMTR-MCNC: 154 MG/DL (ref 70–99)
GLUCOSE SERPL-MCNC: 119 MG/DL (ref 65–99)
HCT VFR BLD AUTO: 28 % (ref 34–46.6)
HGB BLD-MCNC: 8.9 G/DL (ref 12–15.9)
HYPOCHROMIA BLD QL: NORMAL
IMM GRANULOCYTES # BLD AUTO: 0.47 10*3/MM3 (ref 0–0.05)
IMM GRANULOCYTES NFR BLD AUTO: 4 % (ref 0–0.5)
LYMPHOCYTES # BLD AUTO: 1.02 10*3/MM3 (ref 0.7–3.1)
LYMPHOCYTES NFR BLD AUTO: 8.7 % (ref 19.6–45.3)
MAGNESIUM SERPL-MCNC: 1.8 MG/DL (ref 1.6–2.4)
MCH RBC QN AUTO: 23.4 PG (ref 26.6–33)
MCHC RBC AUTO-ENTMCNC: 31.8 G/DL (ref 31.5–35.7)
MCV RBC AUTO: 73.7 FL (ref 79–97)
MICROCYTES BLD QL: NORMAL
MONOCYTES # BLD AUTO: 1.25 10*3/MM3 (ref 0.1–0.9)
MONOCYTES NFR BLD AUTO: 10.7 % (ref 5–12)
NEUTROPHILS NFR BLD AUTO: 75.4 % (ref 42.7–76)
NEUTROPHILS NFR BLD AUTO: 8.85 10*3/MM3 (ref 1.7–7)
NRBC BLD AUTO-RTO: 0 /100 WBC (ref 0–0.2)
PHOSPHATE SERPL-MCNC: 5 MG/DL (ref 2.5–4.5)
PLAT MORPH BLD: NORMAL
PLATELET # BLD AUTO: 349 10*3/MM3 (ref 140–450)
PMV BLD AUTO: 9.6 FL (ref 6–12)
POTASSIUM SERPL-SCNC: 3 MMOL/L (ref 3.5–5.2)
RBC # BLD AUTO: 3.8 10*6/MM3 (ref 3.77–5.28)
SODIUM SERPL-SCNC: 139 MMOL/L (ref 136–145)
TARGETS BLD QL SMEAR: NORMAL
WBC MORPH BLD: NORMAL
WBC NRBC COR # BLD AUTO: 11.73 10*3/MM3 (ref 3.4–10.8)

## 2025-08-13 PROCEDURE — 99232 SBSQ HOSP IP/OBS MODERATE 35: CPT | Performed by: STUDENT IN AN ORGANIZED HEALTH CARE EDUCATION/TRAINING PROGRAM

## 2025-08-13 PROCEDURE — 25010000002 PIPERACILLIN SOD-TAZOBACTAM PER 1 G: Performed by: INTERNAL MEDICINE

## 2025-08-13 PROCEDURE — 82948 REAGENT STRIP/BLOOD GLUCOSE: CPT | Performed by: STUDENT IN AN ORGANIZED HEALTH CARE EDUCATION/TRAINING PROGRAM

## 2025-08-13 PROCEDURE — 25010000002 DOXYCYCLINE 100 MG RECONSTITUTED SOLUTION 1 EACH VIAL: Performed by: INTERNAL MEDICINE

## 2025-08-13 PROCEDURE — 85025 COMPLETE CBC W/AUTO DIFF WBC: CPT | Performed by: INTERNAL MEDICINE

## 2025-08-13 PROCEDURE — 94799 UNLISTED PULMONARY SVC/PX: CPT

## 2025-08-13 PROCEDURE — 99231 SBSQ HOSP IP/OBS SF/LOW 25: CPT | Performed by: INTERNAL MEDICINE

## 2025-08-13 PROCEDURE — 25010000002 HYDROCORTISONE SOD SUC (PF) 100 MG RECONSTITUTED SOLUTION: Performed by: STUDENT IN AN ORGANIZED HEALTH CARE EDUCATION/TRAINING PROGRAM

## 2025-08-13 PROCEDURE — 84100 ASSAY OF PHOSPHORUS: CPT | Performed by: INTERNAL MEDICINE

## 2025-08-13 PROCEDURE — 82948 REAGENT STRIP/BLOOD GLUCOSE: CPT

## 2025-08-13 PROCEDURE — 80048 BASIC METABOLIC PNL TOTAL CA: CPT | Performed by: INTERNAL MEDICINE

## 2025-08-13 PROCEDURE — 85007 BL SMEAR W/DIFF WBC COUNT: CPT | Performed by: INTERNAL MEDICINE

## 2025-08-13 PROCEDURE — 63710000001 INSULIN LISPRO (HUMAN) PER 5 UNITS: Performed by: STUDENT IN AN ORGANIZED HEALTH CARE EDUCATION/TRAINING PROGRAM

## 2025-08-13 PROCEDURE — 83735 ASSAY OF MAGNESIUM: CPT | Performed by: INTERNAL MEDICINE

## 2025-08-13 PROCEDURE — 25010000002 HEPARIN (PORCINE) PER 1000 UNITS: Performed by: INTERNAL MEDICINE

## 2025-08-13 PROCEDURE — 82948 REAGENT STRIP/BLOOD GLUCOSE: CPT | Performed by: INTERNAL MEDICINE

## 2025-08-13 RX ORDER — INSULIN LISPRO 100 [IU]/ML
2-9 INJECTION, SOLUTION INTRAVENOUS; SUBCUTANEOUS
Status: DISCONTINUED | OUTPATIENT
Start: 2025-08-13 | End: 2025-08-16 | Stop reason: HOSPADM

## 2025-08-13 RX ORDER — CALCIUM CARBONATE 500 MG/1
1 TABLET, CHEWABLE ORAL 3 TIMES DAILY PRN
Status: DISCONTINUED | OUTPATIENT
Start: 2025-08-13 | End: 2025-08-16 | Stop reason: HOSPADM

## 2025-08-13 RX ORDER — IBUPROFEN 600 MG/1
1 TABLET ORAL
Status: DISCONTINUED | OUTPATIENT
Start: 2025-08-13 | End: 2025-08-16 | Stop reason: HOSPADM

## 2025-08-13 RX ORDER — DOXYCYCLINE 100 MG/1
100 CAPSULE ORAL EVERY 12 HOURS SCHEDULED
Status: DISCONTINUED | OUTPATIENT
Start: 2025-08-13 | End: 2025-08-16 | Stop reason: HOSPADM

## 2025-08-13 RX ORDER — NICOTINE POLACRILEX 4 MG
15 LOZENGE BUCCAL
Status: DISCONTINUED | OUTPATIENT
Start: 2025-08-13 | End: 2025-08-16 | Stop reason: HOSPADM

## 2025-08-13 RX ORDER — HYDROCORTISONE SODIUM SUCCINATE 100 MG/2ML
50 INJECTION INTRAMUSCULAR; INTRAVENOUS EVERY 12 HOURS
Status: DISCONTINUED | OUTPATIENT
Start: 2025-08-13 | End: 2025-08-15

## 2025-08-13 RX ORDER — DEXTROSE MONOHYDRATE 25 G/50ML
25 INJECTION, SOLUTION INTRAVENOUS
Status: DISCONTINUED | OUTPATIENT
Start: 2025-08-13 | End: 2025-08-16 | Stop reason: HOSPADM

## 2025-08-13 RX ADMIN — DOXYCYCLINE 100 MG: 100 INJECTION, POWDER, LYOPHILIZED, FOR SOLUTION INTRAVENOUS at 10:54

## 2025-08-13 RX ADMIN — Medication 10 ML: at 20:43

## 2025-08-13 RX ADMIN — HEPARIN SODIUM 2200 UNITS: 1000 INJECTION, SOLUTION INTRAVENOUS; SUBCUTANEOUS at 13:36

## 2025-08-13 RX ADMIN — HYDROCORTISONE SODIUM SUCCINATE 50 MG: 100 INJECTION, POWDER, FOR SOLUTION INTRAMUSCULAR; INTRAVENOUS at 10:54

## 2025-08-13 RX ADMIN — INSULIN LISPRO 2 UNITS: 100 INJECTION, SOLUTION INTRAVENOUS; SUBCUTANEOUS at 20:42

## 2025-08-13 RX ADMIN — MUPIROCIN 1 APPLICATION: 20 OINTMENT TOPICAL at 20:43

## 2025-08-13 RX ADMIN — PIPERACILLIN AND TAZOBACTAM 4.5 G: 4; .5 INJECTION, POWDER, FOR SOLUTION INTRAVENOUS at 03:12

## 2025-08-13 RX ADMIN — NYSTATIN 500000 UNITS: 100000 SUSPENSION ORAL at 20:43

## 2025-08-13 RX ADMIN — NYSTATIN 500000 UNITS: 100000 SUSPENSION ORAL at 17:10

## 2025-08-13 RX ADMIN — DOXYCYCLINE 100 MG: 100 CAPSULE ORAL at 20:43

## 2025-08-13 RX ADMIN — HYDROCORTISONE SODIUM SUCCINATE 50 MG: 100 INJECTION, POWDER, FOR SOLUTION INTRAMUSCULAR; INTRAVENOUS at 00:23

## 2025-08-13 RX ADMIN — PIPERACILLIN AND TAZOBACTAM 4.5 G: 4; .5 INJECTION, POWDER, FOR SOLUTION INTRAVENOUS at 15:28

## 2025-08-13 RX ADMIN — HYDROCORTISONE SODIUM SUCCINATE 50 MG: 100 INJECTION, POWDER, FOR SOLUTION INTRAMUSCULAR; INTRAVENOUS at 19:17

## 2025-08-14 LAB
A PHAGOCYTOPH DNA BLD QL NAA+PROBE: NEGATIVE
ANION GAP SERPL CALCULATED.3IONS-SCNC: 12.7 MMOL/L (ref 5–15)
BASOPHILS # BLD AUTO: 0.04 10*3/MM3 (ref 0–0.2)
BASOPHILS NFR BLD AUTO: 0.3 % (ref 0–1.5)
BUN SERPL-MCNC: 35.3 MG/DL (ref 8–23)
BUN/CREAT SERPL: 12.9 (ref 7–25)
CALCIUM SPEC-SCNC: 8.7 MG/DL (ref 8.6–10.5)
CHLORIDE SERPL-SCNC: 105 MMOL/L (ref 98–107)
CO2 SERPL-SCNC: 21.3 MMOL/L (ref 22–29)
CREAT SERPL-MCNC: 2.73 MG/DL (ref 0.57–1)
DEPRECATED RDW RBC AUTO: 43.5 FL (ref 37–54)
EGFRCR SERPLBLD CKD-EPI 2021: 18.9 ML/MIN/1.73
EHRLICHIA DNA SPEC QL NAA+PROBE: NEGATIVE
EOSINOPHIL # BLD AUTO: 0.12 10*3/MM3 (ref 0–0.4)
EOSINOPHIL NFR BLD AUTO: 0.9 % (ref 0.3–6.2)
ERYTHROCYTE [DISTWIDTH] IN BLOOD BY AUTOMATED COUNT: 16.1 % (ref 12.3–15.4)
GLUCOSE BLDC GLUCOMTR-MCNC: 116 MG/DL (ref 70–99)
GLUCOSE BLDC GLUCOMTR-MCNC: 148 MG/DL (ref 70–99)
GLUCOSE BLDC GLUCOMTR-MCNC: 148 MG/DL (ref 70–99)
GLUCOSE BLDC GLUCOMTR-MCNC: 165 MG/DL (ref 70–99)
GLUCOSE SERPL-MCNC: 129 MG/DL (ref 65–99)
HCT VFR BLD AUTO: 28.7 % (ref 34–46.6)
HGB BLD-MCNC: 8.8 G/DL (ref 12–15.9)
IMM GRANULOCYTES # BLD AUTO: 0.73 10*3/MM3 (ref 0–0.05)
IMM GRANULOCYTES NFR BLD AUTO: 5.3 % (ref 0–0.5)
LYMPHOCYTES # BLD AUTO: 1.18 10*3/MM3 (ref 0.7–3.1)
LYMPHOCYTES NFR BLD AUTO: 8.6 % (ref 19.6–45.3)
MAGNESIUM SERPL-MCNC: 1.6 MG/DL (ref 1.6–2.4)
MCH RBC QN AUTO: 22.9 PG (ref 26.6–33)
MCHC RBC AUTO-ENTMCNC: 30.7 G/DL (ref 31.5–35.7)
MCV RBC AUTO: 74.5 FL (ref 79–97)
MONOCYTES # BLD AUTO: 1.12 10*3/MM3 (ref 0.1–0.9)
MONOCYTES NFR BLD AUTO: 8.2 % (ref 5–12)
NEUTROPHILS NFR BLD AUTO: 10.48 10*3/MM3 (ref 1.7–7)
NEUTROPHILS NFR BLD AUTO: 76.7 % (ref 42.7–76)
NRBC BLD AUTO-RTO: 0 /100 WBC (ref 0–0.2)
PHOSPHATE SERPL-MCNC: 3.6 MG/DL (ref 2.5–4.5)
PLATELET # BLD AUTO: 335 10*3/MM3 (ref 140–450)
PMV BLD AUTO: 9.7 FL (ref 6–12)
POTASSIUM SERPL-SCNC: 3.1 MMOL/L (ref 3.5–5.2)
RBC # BLD AUTO: 3.85 10*6/MM3 (ref 3.77–5.28)
SODIUM SERPL-SCNC: 139 MMOL/L (ref 136–145)
WBC NRBC COR # BLD AUTO: 13.67 10*3/MM3 (ref 3.4–10.8)

## 2025-08-14 PROCEDURE — 84100 ASSAY OF PHOSPHORUS: CPT | Performed by: INTERNAL MEDICINE

## 2025-08-14 PROCEDURE — 97110 THERAPEUTIC EXERCISES: CPT

## 2025-08-14 PROCEDURE — 25010000002 HYDROCORTISONE SOD SUC (PF) 100 MG RECONSTITUTED SOLUTION: Performed by: STUDENT IN AN ORGANIZED HEALTH CARE EDUCATION/TRAINING PROGRAM

## 2025-08-14 PROCEDURE — 99233 SBSQ HOSP IP/OBS HIGH 50: CPT

## 2025-08-14 PROCEDURE — 82948 REAGENT STRIP/BLOOD GLUCOSE: CPT | Performed by: STUDENT IN AN ORGANIZED HEALTH CARE EDUCATION/TRAINING PROGRAM

## 2025-08-14 PROCEDURE — 63710000001 INSULIN LISPRO (HUMAN) PER 5 UNITS: Performed by: STUDENT IN AN ORGANIZED HEALTH CARE EDUCATION/TRAINING PROGRAM

## 2025-08-14 PROCEDURE — 80048 BASIC METABOLIC PNL TOTAL CA: CPT | Performed by: INTERNAL MEDICINE

## 2025-08-14 PROCEDURE — 83735 ASSAY OF MAGNESIUM: CPT | Performed by: INTERNAL MEDICINE

## 2025-08-14 PROCEDURE — 85025 COMPLETE CBC W/AUTO DIFF WBC: CPT | Performed by: INTERNAL MEDICINE

## 2025-08-14 PROCEDURE — 25010000002 PIPERACILLIN SOD-TAZOBACTAM PER 1 G: Performed by: INTERNAL MEDICINE

## 2025-08-14 PROCEDURE — 82948 REAGENT STRIP/BLOOD GLUCOSE: CPT

## 2025-08-14 PROCEDURE — 94799 UNLISTED PULMONARY SVC/PX: CPT

## 2025-08-14 RX ORDER — POTASSIUM CHLORIDE 1500 MG/1
40 TABLET, EXTENDED RELEASE ORAL 2 TIMES DAILY
Status: COMPLETED | OUTPATIENT
Start: 2025-08-14 | End: 2025-08-14

## 2025-08-14 RX ORDER — POTASSIUM CHLORIDE 1.5 G/1.58G
40 POWDER, FOR SOLUTION ORAL 2 TIMES DAILY
Status: DISCONTINUED | OUTPATIENT
Start: 2025-08-14 | End: 2025-08-14 | Stop reason: SDUPTHER

## 2025-08-14 RX ADMIN — CALCIUM CARBONATE 1 TABLET: 500 TABLET, CHEWABLE ORAL at 20:31

## 2025-08-14 RX ADMIN — PIPERACILLIN AND TAZOBACTAM 4.5 G: 4; .5 INJECTION, POWDER, FOR SOLUTION INTRAVENOUS at 02:55

## 2025-08-14 RX ADMIN — NYSTATIN 500000 UNITS: 100000 SUSPENSION ORAL at 11:41

## 2025-08-14 RX ADMIN — INSULIN LISPRO 2 UNITS: 100 INJECTION, SOLUTION INTRAVENOUS; SUBCUTANEOUS at 16:54

## 2025-08-14 RX ADMIN — MUPIROCIN 1 APPLICATION: 20 OINTMENT TOPICAL at 08:30

## 2025-08-14 RX ADMIN — NYSTATIN 500000 UNITS: 100000 SUSPENSION ORAL at 17:00

## 2025-08-14 RX ADMIN — Medication 10 ML: at 20:35

## 2025-08-14 RX ADMIN — POTASSIUM CHLORIDE 40 MEQ: 1500 TABLET, EXTENDED RELEASE ORAL at 20:32

## 2025-08-14 RX ADMIN — HYDROCORTISONE SODIUM SUCCINATE 50 MG: 100 INJECTION, POWDER, FOR SOLUTION INTRAMUSCULAR; INTRAVENOUS at 20:34

## 2025-08-14 RX ADMIN — PIPERACILLIN AND TAZOBACTAM 4.5 G: 4; .5 INJECTION, POWDER, FOR SOLUTION INTRAVENOUS at 15:37

## 2025-08-14 RX ADMIN — HYDROCORTISONE SODIUM SUCCINATE 50 MG: 100 INJECTION, POWDER, FOR SOLUTION INTRAMUSCULAR; INTRAVENOUS at 08:30

## 2025-08-14 RX ADMIN — NYSTATIN 500000 UNITS: 100000 SUSPENSION ORAL at 20:31

## 2025-08-14 RX ADMIN — NYSTATIN 500000 UNITS: 100000 SUSPENSION ORAL at 08:30

## 2025-08-14 RX ADMIN — Medication 10 ML: at 08:36

## 2025-08-14 RX ADMIN — DOXYCYCLINE 100 MG: 100 CAPSULE ORAL at 08:30

## 2025-08-14 RX ADMIN — CALCIUM CARBONATE 1 TABLET: 500 TABLET, CHEWABLE ORAL at 08:30

## 2025-08-14 RX ADMIN — POTASSIUM CHLORIDE 40 MEQ: 1500 TABLET, EXTENDED RELEASE ORAL at 09:55

## 2025-08-14 RX ADMIN — DOXYCYCLINE 100 MG: 100 CAPSULE ORAL at 20:32

## 2025-08-15 LAB
027 TOXIN: NORMAL
ANION GAP SERPL CALCULATED.3IONS-SCNC: 12.1 MMOL/L (ref 5–15)
BACTERIA SPEC AEROBE CULT: NORMAL
BACTERIA SPEC AEROBE CULT: NORMAL
BASOPHILS # BLD AUTO: 0.04 10*3/MM3 (ref 0–0.2)
BASOPHILS NFR BLD AUTO: 0.2 % (ref 0–1.5)
BUN SERPL-MCNC: 37.6 MG/DL (ref 8–23)
BUN/CREAT SERPL: 14.9 (ref 7–25)
C DIFF TOX GENS STL QL NAA+PROBE: NEGATIVE
CALCIUM SPEC-SCNC: 9.2 MG/DL (ref 8.6–10.5)
CHLORIDE SERPL-SCNC: 106 MMOL/L (ref 98–107)
CO2 SERPL-SCNC: 21.9 MMOL/L (ref 22–29)
CREAT SERPL-MCNC: 2.52 MG/DL (ref 0.57–1)
DEPRECATED RDW RBC AUTO: 46.4 FL (ref 37–54)
EGFRCR SERPLBLD CKD-EPI 2021: 20.8 ML/MIN/1.73
EOSINOPHIL # BLD AUTO: 0.39 10*3/MM3 (ref 0–0.4)
EOSINOPHIL NFR BLD AUTO: 2.4 % (ref 0.3–6.2)
ERYTHROCYTE [DISTWIDTH] IN BLOOD BY AUTOMATED COUNT: 16.4 % (ref 12.3–15.4)
GLUCOSE BLDC GLUCOMTR-MCNC: 118 MG/DL (ref 70–99)
GLUCOSE BLDC GLUCOMTR-MCNC: 136 MG/DL (ref 70–99)
GLUCOSE BLDC GLUCOMTR-MCNC: 147 MG/DL (ref 70–99)
GLUCOSE BLDC GLUCOMTR-MCNC: 173 MG/DL (ref 70–99)
GLUCOSE SERPL-MCNC: 125 MG/DL (ref 65–99)
HCT VFR BLD AUTO: 28.9 % (ref 34–46.6)
HGB BLD-MCNC: 8.6 G/DL (ref 12–15.9)
IMM GRANULOCYTES # BLD AUTO: 0.7 10*3/MM3 (ref 0–0.05)
IMM GRANULOCYTES NFR BLD AUTO: 4.3 % (ref 0–0.5)
LYMPHOCYTES # BLD AUTO: 1.43 10*3/MM3 (ref 0.7–3.1)
LYMPHOCYTES NFR BLD AUTO: 8.8 % (ref 19.6–45.3)
MAGNESIUM SERPL-MCNC: 1.4 MG/DL (ref 1.6–2.4)
MCH RBC QN AUTO: 23.2 PG (ref 26.6–33)
MCHC RBC AUTO-ENTMCNC: 29.8 G/DL (ref 31.5–35.7)
MCV RBC AUTO: 77.9 FL (ref 79–97)
MICROCYTES BLD QL: NORMAL
MONOCYTES # BLD AUTO: 0.97 10*3/MM3 (ref 0.1–0.9)
MONOCYTES NFR BLD AUTO: 6 % (ref 5–12)
NEUTROPHILS NFR BLD AUTO: 12.74 10*3/MM3 (ref 1.7–7)
NEUTROPHILS NFR BLD AUTO: 78.3 % (ref 42.7–76)
NRBC BLD AUTO-RTO: 0.1 /100 WBC (ref 0–0.2)
PHOSPHATE SERPL-MCNC: 3 MG/DL (ref 2.5–4.5)
PLATELET # BLD AUTO: 312 10*3/MM3 (ref 140–450)
PMV BLD AUTO: 9.6 FL (ref 6–12)
POTASSIUM SERPL-SCNC: 3.4 MMOL/L (ref 3.5–5.2)
RBC # BLD AUTO: 3.71 10*6/MM3 (ref 3.77–5.28)
RICKETTSIA RICKETTSII DNA, RT: NOT DETECTED
SMALL PLATELETS BLD QL SMEAR: ADEQUATE
SODIUM SERPL-SCNC: 140 MMOL/L (ref 136–145)
WBC MORPH BLD: NORMAL
WBC NRBC COR # BLD AUTO: 16.27 10*3/MM3 (ref 3.4–10.8)

## 2025-08-15 PROCEDURE — 85007 BL SMEAR W/DIFF WBC COUNT: CPT | Performed by: INTERNAL MEDICINE

## 2025-08-15 PROCEDURE — 84100 ASSAY OF PHOSPHORUS: CPT | Performed by: INTERNAL MEDICINE

## 2025-08-15 PROCEDURE — 63710000001 INSULIN LISPRO (HUMAN) PER 5 UNITS: Performed by: STUDENT IN AN ORGANIZED HEALTH CARE EDUCATION/TRAINING PROGRAM

## 2025-08-15 PROCEDURE — 87493 C DIFF AMPLIFIED PROBE: CPT

## 2025-08-15 PROCEDURE — 82948 REAGENT STRIP/BLOOD GLUCOSE: CPT

## 2025-08-15 PROCEDURE — 83735 ASSAY OF MAGNESIUM: CPT | Performed by: INTERNAL MEDICINE

## 2025-08-15 PROCEDURE — 25010000002 PIPERACILLIN SOD-TAZOBACTAM PER 1 G: Performed by: INTERNAL MEDICINE

## 2025-08-15 PROCEDURE — 80048 BASIC METABOLIC PNL TOTAL CA: CPT | Performed by: INTERNAL MEDICINE

## 2025-08-15 PROCEDURE — 94799 UNLISTED PULMONARY SVC/PX: CPT

## 2025-08-15 PROCEDURE — 99233 SBSQ HOSP IP/OBS HIGH 50: CPT

## 2025-08-15 PROCEDURE — 25010000002 HYDROCORTISONE SOD SUC (PF) 100 MG RECONSTITUTED SOLUTION: Performed by: STUDENT IN AN ORGANIZED HEALTH CARE EDUCATION/TRAINING PROGRAM

## 2025-08-15 PROCEDURE — 85025 COMPLETE CBC W/AUTO DIFF WBC: CPT | Performed by: INTERNAL MEDICINE

## 2025-08-15 PROCEDURE — 82948 REAGENT STRIP/BLOOD GLUCOSE: CPT | Performed by: STUDENT IN AN ORGANIZED HEALTH CARE EDUCATION/TRAINING PROGRAM

## 2025-08-15 RX ORDER — AMLODIPINE BESYLATE 10 MG/1
10 TABLET ORAL
Status: DISCONTINUED | OUTPATIENT
Start: 2025-08-15 | End: 2025-08-16 | Stop reason: HOSPADM

## 2025-08-15 RX ORDER — CARVEDILOL 12.5 MG/1
12.5 TABLET ORAL 2 TIMES DAILY WITH MEALS
Status: DISCONTINUED | OUTPATIENT
Start: 2025-08-15 | End: 2025-08-15

## 2025-08-15 RX ORDER — PREDNISONE 10 MG/1
10 TABLET ORAL
Status: DISCONTINUED | OUTPATIENT
Start: 2025-08-16 | End: 2025-08-16 | Stop reason: HOSPADM

## 2025-08-15 RX ORDER — POTASSIUM CHLORIDE 1500 MG/1
20 TABLET, EXTENDED RELEASE ORAL 2 TIMES DAILY WITH MEALS
Status: COMPLETED | OUTPATIENT
Start: 2025-08-15 | End: 2025-08-15

## 2025-08-15 RX ADMIN — POTASSIUM CHLORIDE 20 MEQ: 1500 TABLET, EXTENDED RELEASE ORAL at 17:18

## 2025-08-15 RX ADMIN — HYDROCORTISONE SODIUM SUCCINATE 50 MG: 100 INJECTION, POWDER, FOR SOLUTION INTRAMUSCULAR; INTRAVENOUS at 08:50

## 2025-08-15 RX ADMIN — CALCIUM CARBONATE 1 TABLET: 500 TABLET, CHEWABLE ORAL at 13:49

## 2025-08-15 RX ADMIN — POTASSIUM CHLORIDE 20 MEQ: 1500 TABLET, EXTENDED RELEASE ORAL at 08:50

## 2025-08-15 RX ADMIN — DOXYCYCLINE 100 MG: 100 CAPSULE ORAL at 08:50

## 2025-08-15 RX ADMIN — INSULIN LISPRO 2 UNITS: 100 INJECTION, SOLUTION INTRAVENOUS; SUBCUTANEOUS at 17:14

## 2025-08-15 RX ADMIN — CALCIUM CARBONATE 1 TABLET: 500 TABLET, CHEWABLE ORAL at 03:25

## 2025-08-15 RX ADMIN — Medication 10 ML: at 21:52

## 2025-08-15 RX ADMIN — AMLODIPINE BESYLATE 10 MG: 10 TABLET ORAL at 08:50

## 2025-08-15 RX ADMIN — CARVEDILOL 12.5 MG: 12.5 TABLET, FILM COATED ORAL at 08:50

## 2025-08-15 RX ADMIN — DOXYCYCLINE 100 MG: 100 CAPSULE ORAL at 21:52

## 2025-08-15 RX ADMIN — PIPERACILLIN AND TAZOBACTAM 4.5 G: 4; .5 INJECTION, POWDER, FOR SOLUTION INTRAVENOUS at 17:14

## 2025-08-15 RX ADMIN — Medication 10 ML: at 08:51

## 2025-08-15 RX ADMIN — PIPERACILLIN AND TAZOBACTAM 4.5 G: 4; .5 INJECTION, POWDER, FOR SOLUTION INTRAVENOUS at 02:53

## 2025-08-16 VITALS
RESPIRATION RATE: 20 BRPM | DIASTOLIC BLOOD PRESSURE: 85 MMHG | SYSTOLIC BLOOD PRESSURE: 135 MMHG | WEIGHT: 293 LBS | BODY MASS INDEX: 44.41 KG/M2 | TEMPERATURE: 97.9 F | HEART RATE: 80 BPM | OXYGEN SATURATION: 99 % | HEIGHT: 68 IN

## 2025-08-16 PROBLEM — A41.9 SEPTIC SHOCK: Status: RESOLVED | Noted: 2025-08-09 | Resolved: 2025-08-16

## 2025-08-16 PROBLEM — R65.21 SEPTIC SHOCK: Status: RESOLVED | Noted: 2025-08-09 | Resolved: 2025-08-16

## 2025-08-16 LAB
ANION GAP SERPL CALCULATED.3IONS-SCNC: 12.2 MMOL/L (ref 5–15)
BASOPHILS # BLD AUTO: 0.05 10*3/MM3 (ref 0–0.2)
BASOPHILS NFR BLD AUTO: 0.3 % (ref 0–1.5)
BUN SERPL-MCNC: 37 MG/DL (ref 8–23)
BUN/CREAT SERPL: 16 (ref 7–25)
CALCIUM SPEC-SCNC: 9.1 MG/DL (ref 8.6–10.5)
CHLORIDE SERPL-SCNC: 106 MMOL/L (ref 98–107)
CO2 SERPL-SCNC: 21.8 MMOL/L (ref 22–29)
CREAT SERPL-MCNC: 2.31 MG/DL (ref 0.57–1)
DEPRECATED RDW RBC AUTO: 43.9 FL (ref 37–54)
EGFRCR SERPLBLD CKD-EPI 2021: 23.1 ML/MIN/1.73
EOSINOPHIL # BLD AUTO: 0.53 10*3/MM3 (ref 0–0.4)
EOSINOPHIL NFR BLD AUTO: 3.6 % (ref 0.3–6.2)
ERYTHROCYTE [DISTWIDTH] IN BLOOD BY AUTOMATED COUNT: 16.8 % (ref 12.3–15.4)
GLUCOSE BLDC GLUCOMTR-MCNC: 143 MG/DL (ref 70–99)
GLUCOSE BLDC GLUCOMTR-MCNC: 156 MG/DL (ref 70–99)
GLUCOSE SERPL-MCNC: 128 MG/DL (ref 65–99)
HCT VFR BLD AUTO: 27.5 % (ref 34–46.6)
HGB BLD-MCNC: 8.5 G/DL (ref 12–15.9)
IMM GRANULOCYTES # BLD AUTO: 0.4 10*3/MM3 (ref 0–0.05)
IMM GRANULOCYTES NFR BLD AUTO: 2.7 % (ref 0–0.5)
LYMPHOCYTES # BLD AUTO: 1.18 10*3/MM3 (ref 0.7–3.1)
LYMPHOCYTES NFR BLD AUTO: 8.1 % (ref 19.6–45.3)
MAGNESIUM SERPL-MCNC: 1.1 MG/DL (ref 1.6–2.4)
MCH RBC QN AUTO: 23.5 PG (ref 26.6–33)
MCHC RBC AUTO-ENTMCNC: 30.9 G/DL (ref 31.5–35.7)
MCV RBC AUTO: 76 FL (ref 79–97)
MONOCYTES # BLD AUTO: 0.79 10*3/MM3 (ref 0.1–0.9)
MONOCYTES NFR BLD AUTO: 5.4 % (ref 5–12)
NEUTROPHILS NFR BLD AUTO: 11.68 10*3/MM3 (ref 1.7–7)
NEUTROPHILS NFR BLD AUTO: 79.9 % (ref 42.7–76)
NRBC BLD AUTO-RTO: 0 /100 WBC (ref 0–0.2)
PHOSPHATE SERPL-MCNC: 3.2 MG/DL (ref 2.5–4.5)
PLATELET # BLD AUTO: 252 10*3/MM3 (ref 140–450)
PMV BLD AUTO: 9.5 FL (ref 6–12)
POTASSIUM SERPL-SCNC: 3.3 MMOL/L (ref 3.5–5.2)
RBC # BLD AUTO: 3.62 10*6/MM3 (ref 3.77–5.28)
SODIUM SERPL-SCNC: 140 MMOL/L (ref 136–145)
WBC NRBC COR # BLD AUTO: 14.63 10*3/MM3 (ref 3.4–10.8)

## 2025-08-16 PROCEDURE — 84100 ASSAY OF PHOSPHORUS: CPT | Performed by: INTERNAL MEDICINE

## 2025-08-16 PROCEDURE — 82948 REAGENT STRIP/BLOOD GLUCOSE: CPT | Performed by: STUDENT IN AN ORGANIZED HEALTH CARE EDUCATION/TRAINING PROGRAM

## 2025-08-16 PROCEDURE — 94799 UNLISTED PULMONARY SVC/PX: CPT

## 2025-08-16 PROCEDURE — 25010000002 MAGNESIUM SULFATE IN D5W 1G/100ML (PREMIX) 1-5 GM/100ML-% SOLUTION

## 2025-08-16 PROCEDURE — 83735 ASSAY OF MAGNESIUM: CPT | Performed by: INTERNAL MEDICINE

## 2025-08-16 PROCEDURE — 80048 BASIC METABOLIC PNL TOTAL CA: CPT

## 2025-08-16 PROCEDURE — 63710000001 PREDNISONE PER 5 MG

## 2025-08-16 PROCEDURE — 25010000002 PIPERACILLIN SOD-TAZOBACTAM PER 1 G: Performed by: INTERNAL MEDICINE

## 2025-08-16 PROCEDURE — 63710000001 INSULIN LISPRO (HUMAN) PER 5 UNITS: Performed by: STUDENT IN AN ORGANIZED HEALTH CARE EDUCATION/TRAINING PROGRAM

## 2025-08-16 PROCEDURE — 85025 COMPLETE CBC W/AUTO DIFF WBC: CPT

## 2025-08-16 PROCEDURE — 82948 REAGENT STRIP/BLOOD GLUCOSE: CPT

## 2025-08-16 PROCEDURE — 99239 HOSP IP/OBS DSCHRG MGMT >30: CPT

## 2025-08-16 RX ORDER — PREDNISONE 10 MG/1
TABLET ORAL
Qty: 2 TABLET | Refills: 0 | Status: SHIPPED | OUTPATIENT
Start: 2025-08-17 | End: 2025-08-24 | Stop reason: HOSPADM

## 2025-08-16 RX ORDER — MAGNESIUM SULFATE 1 G/100ML
1 INJECTION INTRAVENOUS ONCE
Status: COMPLETED | OUTPATIENT
Start: 2025-08-16 | End: 2025-08-16

## 2025-08-16 RX ORDER — AMLODIPINE BESYLATE 10 MG/1
10 TABLET ORAL
Qty: 30 TABLET | Refills: 0 | Status: SHIPPED | OUTPATIENT
Start: 2025-08-17 | End: 2025-08-24 | Stop reason: HOSPADM

## 2025-08-16 RX ORDER — METRONIDAZOLE 500 MG/1
500 TABLET ORAL 3 TIMES DAILY
Qty: 27 TABLET | Refills: 0 | Status: SHIPPED | OUTPATIENT
Start: 2025-08-16

## 2025-08-16 RX ORDER — LEVOFLOXACIN 750 MG/1
750 TABLET, FILM COATED ORAL DAILY
Qty: 9 TABLET | Refills: 0 | Status: SHIPPED | OUTPATIENT
Start: 2025-08-16 | End: 2025-08-25

## 2025-08-16 RX ORDER — POTASSIUM CHLORIDE 1500 MG/1
20 TABLET, EXTENDED RELEASE ORAL 2 TIMES DAILY WITH MEALS
Status: DISCONTINUED | OUTPATIENT
Start: 2025-08-16 | End: 2025-08-16 | Stop reason: HOSPADM

## 2025-08-16 RX ADMIN — DOXYCYCLINE 100 MG: 100 CAPSULE ORAL at 08:13

## 2025-08-16 RX ADMIN — CALCIUM CARBONATE 1 TABLET: 500 TABLET, CHEWABLE ORAL at 11:09

## 2025-08-16 RX ADMIN — AMLODIPINE BESYLATE 10 MG: 10 TABLET ORAL at 08:13

## 2025-08-16 RX ADMIN — MAGNESIUM SULFATE HEPTAHYDRATE 1 G: 1 INJECTION, SOLUTION INTRAVENOUS at 11:26

## 2025-08-16 RX ADMIN — PIPERACILLIN AND TAZOBACTAM 4.5 G: 4; .5 INJECTION, POWDER, FOR SOLUTION INTRAVENOUS at 03:07

## 2025-08-16 RX ADMIN — PREDNISONE 10 MG: 10 TABLET ORAL at 08:13

## 2025-08-16 RX ADMIN — Medication 10 ML: at 08:14

## 2025-08-16 RX ADMIN — INSULIN LISPRO 2 UNITS: 100 INJECTION, SOLUTION INTRAVENOUS; SUBCUTANEOUS at 11:24

## 2025-08-16 RX ADMIN — POTASSIUM CHLORIDE 20 MEQ: 1500 TABLET, EXTENDED RELEASE ORAL at 08:13

## 2025-08-18 ENCOUNTER — READMISSION MANAGEMENT (OUTPATIENT)
Dept: CALL CENTER | Facility: HOSPITAL | Age: 65
End: 2025-08-18
Payer: MEDICARE

## 2025-08-18 ENCOUNTER — TRANSITIONAL CARE MANAGEMENT TELEPHONE ENCOUNTER (OUTPATIENT)
Dept: CALL CENTER | Facility: HOSPITAL | Age: 65
End: 2025-08-18
Payer: MEDICARE

## 2025-08-19 ENCOUNTER — TRANSITIONAL CARE MANAGEMENT TELEPHONE ENCOUNTER (OUTPATIENT)
Dept: CALL CENTER | Facility: HOSPITAL | Age: 65
End: 2025-08-19
Payer: MEDICARE

## 2025-08-19 ENCOUNTER — TELEPHONE (OUTPATIENT)
Dept: FAMILY MEDICINE CLINIC | Age: 65
End: 2025-08-19
Payer: MEDICARE

## 2025-08-20 ENCOUNTER — APPOINTMENT (OUTPATIENT)
Dept: GENERAL RADIOLOGY | Facility: HOSPITAL | Age: 65
DRG: 065 | End: 2025-08-20
Payer: MEDICARE

## 2025-08-20 ENCOUNTER — APPOINTMENT (OUTPATIENT)
Dept: CT IMAGING | Facility: HOSPITAL | Age: 65
DRG: 065 | End: 2025-08-20
Payer: MEDICARE

## 2025-08-20 ENCOUNTER — HOSPITAL ENCOUNTER (INPATIENT)
Facility: HOSPITAL | Age: 65
LOS: 4 days | Discharge: HOME | DRG: 065 | End: 2025-08-24
Attending: EMERGENCY MEDICINE | Admitting: INTERNAL MEDICINE
Payer: MEDICARE

## 2025-08-20 DIAGNOSIS — E87.6 HYPOKALEMIA: ICD-10-CM

## 2025-08-20 DIAGNOSIS — R47.1 DYSARTHRIA: ICD-10-CM

## 2025-08-20 DIAGNOSIS — I63.9 ACUTE CVA (CEREBROVASCULAR ACCIDENT): Primary | ICD-10-CM

## 2025-08-20 DIAGNOSIS — N18.9 CHRONIC RENAL IMPAIRMENT, UNSPECIFIED CKD STAGE: ICD-10-CM

## 2025-08-20 DIAGNOSIS — D64.9 CHRONIC ANEMIA: ICD-10-CM

## 2025-08-20 DIAGNOSIS — R47.01 EXPRESSIVE APHASIA: ICD-10-CM

## 2025-08-20 LAB
ABO GROUP BLD: NORMAL
ALBUMIN SERPL-MCNC: 3.3 G/DL (ref 3.5–5.2)
ALBUMIN/GLOB SERPL: 1.1 G/DL
ALP SERPL-CCNC: 150 U/L (ref 39–117)
ALT SERPL W P-5'-P-CCNC: 29 U/L (ref 1–33)
ANION GAP SERPL CALCULATED.3IONS-SCNC: 15 MMOL/L (ref 5–15)
APTT PPP: 21.4 SECONDS (ref 22.7–35.4)
APTT PPP: 27.5 SECONDS (ref 22.7–35.4)
AST SERPL-CCNC: 25 U/L (ref 1–32)
BASOPHILS # BLD AUTO: 0.04 10*3/MM3 (ref 0–0.2)
BASOPHILS NFR BLD AUTO: 0.4 % (ref 0–1.5)
BILIRUB SERPL-MCNC: 0.6 MG/DL (ref 0–1.2)
BLD GP AB SCN SERPL QL: NEGATIVE
BUN SERPL-MCNC: 33 MG/DL (ref 8–23)
BUN/CREAT SERPL: 15.7 (ref 7–25)
CALCIUM SPEC-SCNC: 8.3 MG/DL (ref 8.6–10.5)
CHLORIDE SERPL-SCNC: 101 MMOL/L (ref 98–107)
CO2 SERPL-SCNC: 25 MMOL/L (ref 22–29)
CREAT SERPL-MCNC: 2.1 MG/DL (ref 0.57–1)
DEPRECATED RDW RBC AUTO: 45.5 FL (ref 37–54)
EGFRCR SERPLBLD CKD-EPI 2021: 25.9 ML/MIN/1.73
EOSINOPHIL # BLD AUTO: 0.09 10*3/MM3 (ref 0–0.4)
EOSINOPHIL NFR BLD AUTO: 0.8 % (ref 0.3–6.2)
ERYTHROCYTE [DISTWIDTH] IN BLOOD BY AUTOMATED COUNT: 16.8 % (ref 12.3–15.4)
GLOBULIN UR ELPH-MCNC: 2.9 GM/DL
GLUCOSE BLDC GLUCOMTR-MCNC: 182 MG/DL (ref 70–99)
GLUCOSE BLDC GLUCOMTR-MCNC: 57 MG/DL (ref 65–99)
GLUCOSE BLDC GLUCOMTR-MCNC: 70 MG/DL (ref 65–99)
GLUCOSE BLDC GLUCOMTR-MCNC: 72 MG/DL (ref 65–99)
GLUCOSE BLDC GLUCOMTR-MCNC: 73 MG/DL (ref 65–99)
GLUCOSE BLDC GLUCOMTR-MCNC: 99 MG/DL (ref 65–99)
GLUCOSE SERPL-MCNC: 69 MG/DL (ref 65–99)
HCT VFR BLD AUTO: 27.2 % (ref 34–46.6)
HGB BLD-MCNC: 8.1 G/DL (ref 12–15.9)
HOLD SPECIMEN: NORMAL
HOLD SPECIMEN: NORMAL
IMM GRANULOCYTES # BLD AUTO: 0.05 10*3/MM3 (ref 0–0.05)
IMM GRANULOCYTES NFR BLD AUTO: 0.5 % (ref 0–0.5)
INR PPP: 1.34 (ref 0.9–1.1)
LYMPHOCYTES # BLD AUTO: 0.61 10*3/MM3 (ref 0.7–3.1)
LYMPHOCYTES NFR BLD AUTO: 5.6 % (ref 19.6–45.3)
MAGNESIUM SERPL-MCNC: 1 MG/DL (ref 1.6–2.4)
MCH RBC QN AUTO: 23.7 PG (ref 26.6–33)
MCHC RBC AUTO-ENTMCNC: 29.8 G/DL (ref 31.5–35.7)
MCV RBC AUTO: 79.5 FL (ref 79–97)
MONOCYTES # BLD AUTO: 0.6 10*3/MM3 (ref 0.1–0.9)
MONOCYTES NFR BLD AUTO: 5.5 % (ref 5–12)
NEUTROPHILS NFR BLD AUTO: 87.2 % (ref 42.7–76)
NEUTROPHILS NFR BLD AUTO: 9.46 10*3/MM3 (ref 1.7–7)
NRBC BLD AUTO-RTO: 0 /100 WBC (ref 0–0.2)
PLATELET # BLD AUTO: 213 10*3/MM3 (ref 140–450)
PMV BLD AUTO: 11.6 FL (ref 6–12)
POTASSIUM SERPL-SCNC: 2.7 MMOL/L (ref 3.5–5.2)
PROT SERPL-MCNC: 6.2 G/DL (ref 6–8.5)
PROTHROMBIN TIME: 16.6 SECONDS (ref 11.7–14.2)
RBC # BLD AUTO: 3.42 10*6/MM3 (ref 3.77–5.28)
RH BLD: POSITIVE
SODIUM SERPL-SCNC: 141 MMOL/L (ref 136–145)
T&S EXPIRATION DATE: NORMAL
WBC NRBC COR # BLD AUTO: 10.85 10*3/MM3 (ref 3.4–10.8)
WHOLE BLOOD HOLD COAG: NORMAL
WHOLE BLOOD HOLD SPECIMEN: NORMAL

## 2025-08-20 PROCEDURE — 80053 COMPREHEN METABOLIC PANEL: CPT | Performed by: EMERGENCY MEDICINE

## 2025-08-20 PROCEDURE — 85730 THROMBOPLASTIN TIME PARTIAL: CPT | Performed by: EMERGENCY MEDICINE

## 2025-08-20 PROCEDURE — 99222 1ST HOSP IP/OBS MODERATE 55: CPT

## 2025-08-20 PROCEDURE — 85610 PROTHROMBIN TIME: CPT | Performed by: EMERGENCY MEDICINE

## 2025-08-20 PROCEDURE — 99291 CRITICAL CARE FIRST HOUR: CPT

## 2025-08-20 PROCEDURE — 86900 BLOOD TYPING SEROLOGIC ABO: CPT | Performed by: EMERGENCY MEDICINE

## 2025-08-20 PROCEDURE — 86850 RBC ANTIBODY SCREEN: CPT | Performed by: EMERGENCY MEDICINE

## 2025-08-20 PROCEDURE — 70496 CT ANGIOGRAPHY HEAD: CPT

## 2025-08-20 PROCEDURE — 82948 REAGENT STRIP/BLOOD GLUCOSE: CPT

## 2025-08-20 PROCEDURE — 70498 CT ANGIOGRAPHY NECK: CPT

## 2025-08-20 PROCEDURE — 25810000003 SODIUM CHLORIDE 0.9 % SOLUTION

## 2025-08-20 PROCEDURE — 36415 COLL VENOUS BLD VENIPUNCTURE: CPT | Performed by: EMERGENCY MEDICINE

## 2025-08-20 PROCEDURE — 85730 THROMBOPLASTIN TIME PARTIAL: CPT

## 2025-08-20 PROCEDURE — 93005 ELECTROCARDIOGRAM TRACING: CPT | Performed by: EMERGENCY MEDICINE

## 2025-08-20 PROCEDURE — 0042T HC CT CEREBRAL PERFUSION W/WO CONTRAST: CPT

## 2025-08-20 PROCEDURE — 93010 ELECTROCARDIOGRAM REPORT: CPT | Performed by: INTERNAL MEDICINE

## 2025-08-20 PROCEDURE — 71045 X-RAY EXAM CHEST 1 VIEW: CPT

## 2025-08-20 PROCEDURE — 83735 ASSAY OF MAGNESIUM: CPT | Performed by: EMERGENCY MEDICINE

## 2025-08-20 PROCEDURE — 25010000002 HEPARIN (PORCINE) 25000-0.45 UT/250ML-% SOLUTION

## 2025-08-20 PROCEDURE — 25010000002 POTASSIUM CHLORIDE 10 MEQ/100ML SOLUTION: Performed by: INTERNAL MEDICINE

## 2025-08-20 PROCEDURE — 25010000002 MAGNESIUM SULFATE 2 GM/50ML SOLUTION: Performed by: INTERNAL MEDICINE

## 2025-08-20 PROCEDURE — 25510000001 IOPAMIDOL PER 1 ML: Performed by: EMERGENCY MEDICINE

## 2025-08-20 PROCEDURE — 25810000003 SODIUM CHLORIDE 0.9 % SOLUTION: Performed by: EMERGENCY MEDICINE

## 2025-08-20 PROCEDURE — 86901 BLOOD TYPING SEROLOGIC RH(D): CPT | Performed by: EMERGENCY MEDICINE

## 2025-08-20 PROCEDURE — 36415 COLL VENOUS BLD VENIPUNCTURE: CPT

## 2025-08-20 PROCEDURE — 85025 COMPLETE CBC W/AUTO DIFF WBC: CPT | Performed by: EMERGENCY MEDICINE

## 2025-08-20 RX ORDER — LEVOFLOXACIN 750 MG/1
750 TABLET, FILM COATED ORAL
Status: DISCONTINUED | OUTPATIENT
Start: 2025-08-21 | End: 2025-08-21

## 2025-08-20 RX ORDER — HEPARIN SODIUM 10000 [USP'U]/100ML
6.21 INJECTION, SOLUTION INTRAVENOUS
Status: DISCONTINUED | OUTPATIENT
Start: 2025-08-20 | End: 2025-08-22

## 2025-08-20 RX ORDER — NICOTINE POLACRILEX 4 MG
15 LOZENGE BUCCAL
Status: DISCONTINUED | OUTPATIENT
Start: 2025-08-20 | End: 2025-08-24 | Stop reason: HOSPADM

## 2025-08-20 RX ORDER — SODIUM CHLORIDE 0.9 % (FLUSH) 0.9 %
10 SYRINGE (ML) INJECTION AS NEEDED
Status: DISCONTINUED | OUTPATIENT
Start: 2025-08-20 | End: 2025-08-24 | Stop reason: HOSPADM

## 2025-08-20 RX ORDER — ATORVASTATIN CALCIUM 80 MG/1
80 TABLET, FILM COATED ORAL NIGHTLY
Status: DISCONTINUED | OUTPATIENT
Start: 2025-08-20 | End: 2025-08-24 | Stop reason: HOSPADM

## 2025-08-20 RX ORDER — IOPAMIDOL 755 MG/ML
150 INJECTION, SOLUTION INTRAVASCULAR
Status: COMPLETED | OUTPATIENT
Start: 2025-08-20 | End: 2025-08-20

## 2025-08-20 RX ORDER — POTASSIUM CHLORIDE 1500 MG/1
40 TABLET, EXTENDED RELEASE ORAL
Status: DISCONTINUED | OUTPATIENT
Start: 2025-08-20 | End: 2025-08-20

## 2025-08-20 RX ORDER — POTASSIUM CHLORIDE 7.45 MG/ML
10 INJECTION INTRAVENOUS
Status: COMPLETED | OUTPATIENT
Start: 2025-08-20 | End: 2025-08-21

## 2025-08-20 RX ORDER — SODIUM CHLORIDE 0.9 % (FLUSH) 0.9 %
10 SYRINGE (ML) INJECTION EVERY 12 HOURS SCHEDULED
Status: DISCONTINUED | OUTPATIENT
Start: 2025-08-20 | End: 2025-08-24 | Stop reason: HOSPADM

## 2025-08-20 RX ORDER — ASPIRIN 81 MG/1
81 TABLET, CHEWABLE ORAL DAILY
Status: DISCONTINUED | OUTPATIENT
Start: 2025-08-20 | End: 2025-08-24 | Stop reason: HOSPADM

## 2025-08-20 RX ORDER — SODIUM CHLORIDE 9 MG/ML
125 INJECTION, SOLUTION INTRAVENOUS CONTINUOUS
Status: ACTIVE | OUTPATIENT
Start: 2025-08-20 | End: 2025-08-21

## 2025-08-20 RX ORDER — MAGNESIUM SULFATE HEPTAHYDRATE 40 MG/ML
4 INJECTION, SOLUTION INTRAVENOUS EVERY 4 HOURS
Status: COMPLETED | OUTPATIENT
Start: 2025-08-20 | End: 2025-08-21

## 2025-08-20 RX ORDER — METRONIDAZOLE 500 MG/1
500 TABLET ORAL 3 TIMES DAILY
Status: DISCONTINUED | OUTPATIENT
Start: 2025-08-20 | End: 2025-08-24 | Stop reason: HOSPADM

## 2025-08-20 RX ORDER — ASPIRIN 300 MG/1
300 SUPPOSITORY RECTAL DAILY
Status: DISCONTINUED | OUTPATIENT
Start: 2025-08-20 | End: 2025-08-24 | Stop reason: HOSPADM

## 2025-08-20 RX ORDER — IBUPROFEN 600 MG/1
1 TABLET ORAL
Status: DISCONTINUED | OUTPATIENT
Start: 2025-08-20 | End: 2025-08-24 | Stop reason: HOSPADM

## 2025-08-20 RX ORDER — SODIUM CHLORIDE 9 MG/ML
40 INJECTION, SOLUTION INTRAVENOUS AS NEEDED
Status: DISCONTINUED | OUTPATIENT
Start: 2025-08-20 | End: 2025-08-24 | Stop reason: HOSPADM

## 2025-08-20 RX ORDER — DEXTROSE MONOHYDRATE 25 G/50ML
25 INJECTION, SOLUTION INTRAVENOUS
Status: DISCONTINUED | OUTPATIENT
Start: 2025-08-20 | End: 2025-08-24 | Stop reason: HOSPADM

## 2025-08-20 RX ORDER — MAGNESIUM SULFATE HEPTAHYDRATE 40 MG/ML
2 INJECTION, SOLUTION INTRAVENOUS
Status: DISPENSED | OUTPATIENT
Start: 2025-08-20 | End: 2025-08-20

## 2025-08-20 RX ORDER — POTASSIUM CHLORIDE 7.45 MG/ML
10 INJECTION INTRAVENOUS
Status: DISCONTINUED | OUTPATIENT
Start: 2025-08-20 | End: 2025-08-20

## 2025-08-20 RX ORDER — SODIUM CHLORIDE 0.9 % (FLUSH) 0.9 %
10 SYRINGE (ML) INJECTION AS NEEDED
Status: DISCONTINUED | OUTPATIENT
Start: 2025-08-20 | End: 2025-08-20

## 2025-08-20 RX ADMIN — SODIUM CHLORIDE 100 ML/HR: 9 INJECTION, SOLUTION INTRAVENOUS at 18:12

## 2025-08-20 RX ADMIN — DEXTROSE MONOHYDRATE 25 G: 25 INJECTION, SOLUTION INTRAVENOUS at 20:08

## 2025-08-20 RX ADMIN — SODIUM CHLORIDE 1000 ML: 9 INJECTION, SOLUTION INTRAVENOUS at 16:28

## 2025-08-20 RX ADMIN — POTASSIUM CHLORIDE 10 MEQ: 7.46 INJECTION, SOLUTION INTRAVENOUS at 20:07

## 2025-08-20 RX ADMIN — ASPIRIN 300 MG: 300 SUPPOSITORY RECTAL at 17:11

## 2025-08-20 RX ADMIN — POTASSIUM CHLORIDE 10 MEQ: 7.46 INJECTION, SOLUTION INTRAVENOUS at 21:24

## 2025-08-20 RX ADMIN — IOPAMIDOL 145 ML: 755 INJECTION, SOLUTION INTRAVENOUS at 16:18

## 2025-08-20 RX ADMIN — Medication 10 ML: at 20:08

## 2025-08-20 RX ADMIN — MAGNESIUM SULFATE IN WATER FOR 4 G: 40 INJECTION INTRAVENOUS at 20:48

## 2025-08-20 RX ADMIN — HEPARIN SODIUM 6.21 UNITS/KG/HR: 10000 INJECTION, SOLUTION INTRAVENOUS at 17:14

## 2025-08-20 RX ADMIN — POTASSIUM CHLORIDE 10 MEQ: 7.46 INJECTION, SOLUTION INTRAVENOUS at 23:04

## 2025-08-21 ENCOUNTER — APPOINTMENT (OUTPATIENT)
Dept: CARDIOLOGY | Facility: HOSPITAL | Age: 65
DRG: 065 | End: 2025-08-21
Payer: MEDICARE

## 2025-08-21 ENCOUNTER — APPOINTMENT (OUTPATIENT)
Dept: MRI IMAGING | Facility: HOSPITAL | Age: 65
DRG: 065 | End: 2025-08-21
Payer: MEDICARE

## 2025-08-21 LAB
ANION GAP SERPL CALCULATED.3IONS-SCNC: 13 MMOL/L (ref 5–15)
AORTIC ARCH: 2.3 CM
AORTIC DIMENSIONLESS INDEX: 0.62 (DI)
APTT PPP: 26.1 SECONDS (ref 22.7–35.4)
APTT PPP: 57.6 SECONDS (ref 22.7–35.4)
APTT PPP: 76.2 SECONDS (ref 22.7–35.4)
ASCENDING AORTA: 3.2 CM
AV MEAN PRESS GRAD SYS DOP V1V2: 9.9 MMHG
AV VMAX SYS DOP: 210.8 CM/SEC
BASOPHILS # BLD AUTO: 0.1 10*3/MM3 (ref 0–0.2)
BASOPHILS # BLD AUTO: 0.1 10*3/MM3 (ref 0–0.2)
BASOPHILS NFR BLD AUTO: 0.7 % (ref 0–1.5)
BASOPHILS NFR BLD AUTO: 0.8 % (ref 0–1.5)
BH CV ECHO MEAS - ACS: 1.8 CM
BH CV ECHO MEAS - AO MAX PG: 17.8 MMHG
BH CV ECHO MEAS - AO ROOT DIAM: 3.1 CM
BH CV ECHO MEAS - AO V2 VTI: 46 CM
BH CV ECHO MEAS - AVA(I,D): 1.79 CM2
BH CV ECHO MEAS - EDV(CUBED): 161.5 ML
BH CV ECHO MEAS - EDV(MOD-SP2): 158 ML
BH CV ECHO MEAS - EDV(MOD-SP4): 155 ML
BH CV ECHO MEAS - EF(MOD-SP2): 70.3 %
BH CV ECHO MEAS - EF(MOD-SP4): 74.2 %
BH CV ECHO MEAS - ESV(CUBED): 36.2 ML
BH CV ECHO MEAS - ESV(MOD-SP2): 47 ML
BH CV ECHO MEAS - ESV(MOD-SP4): 40 ML
BH CV ECHO MEAS - FS: 39.2 %
BH CV ECHO MEAS - IVS/LVPW: 1.03 CM
BH CV ECHO MEAS - IVSD: 1.02 CM
BH CV ECHO MEAS - LAT PEAK E' VEL: 9.9 CM/SEC
BH CV ECHO MEAS - LV DIASTOLIC VOL/BSA (35-75): 59.1 CM2
BH CV ECHO MEAS - LV MASS(C)D: 211.6 GRAMS
BH CV ECHO MEAS - LV MAX PG: 6.3 MMHG
BH CV ECHO MEAS - LV MEAN PG: 3.7 MMHG
BH CV ECHO MEAS - LV SYSTOLIC VOL/BSA (12-30): 15.3 CM2
BH CV ECHO MEAS - LV V1 MAX: 125.8 CM/SEC
BH CV ECHO MEAS - LV V1 VTI: 28.7 CM
BH CV ECHO MEAS - LVIDD: 5.4 CM
BH CV ECHO MEAS - LVIDS: 3.3 CM
BH CV ECHO MEAS - LVOT AREA: 2.9 CM2
BH CV ECHO MEAS - LVOT DIAM: 1.91 CM
BH CV ECHO MEAS - LVPWD: 0.99 CM
BH CV ECHO MEAS - MED PEAK E' VEL: 7.3 CM/SEC
BH CV ECHO MEAS - MR MAX PG: 43.5 MMHG
BH CV ECHO MEAS - MR MAX VEL: 329.7 CM/SEC
BH CV ECHO MEAS - MV A DUR: 0.15 SEC
BH CV ECHO MEAS - MV A MAX VEL: 81.1 CM/SEC
BH CV ECHO MEAS - MV DEC SLOPE: 603.6 CM/SEC2
BH CV ECHO MEAS - MV DEC TIME: 0.16 SEC
BH CV ECHO MEAS - MV E MAX VEL: 115 CM/SEC
BH CV ECHO MEAS - MV E/A: 1.42
BH CV ECHO MEAS - MV MAX PG: 6.3 MMHG
BH CV ECHO MEAS - MV MEAN PG: 2.25 MMHG
BH CV ECHO MEAS - MV P1/2T: 60.3 MSEC
BH CV ECHO MEAS - MV V2 VTI: 40.2 CM
BH CV ECHO MEAS - MVA(P1/2T): 3.6 CM2
BH CV ECHO MEAS - MVA(VTI): 2.05 CM2
BH CV ECHO MEAS - PA ACC TIME: 0.07 SEC
BH CV ECHO MEAS - PA V2 MAX: 120.1 CM/SEC
BH CV ECHO MEAS - PULM A REVS DUR: 0.11 SEC
BH CV ECHO MEAS - PULM A REVS VEL: 19.2 CM/SEC
BH CV ECHO MEAS - PULM DIAS VEL: 31.9 CM/SEC
BH CV ECHO MEAS - PULM S/D: 0.87
BH CV ECHO MEAS - PULM SYS VEL: 27.8 CM/SEC
BH CV ECHO MEAS - RAP SYSTOLE: 8 MMHG
BH CV ECHO MEAS - RV MAX PG: 2.7 MMHG
BH CV ECHO MEAS - RV V1 MAX: 81.8 CM/SEC
BH CV ECHO MEAS - RV V1 VTI: 18.4 CM
BH CV ECHO MEAS - RVSP: 32 MMHG
BH CV ECHO MEAS - SUP REN AO DIAM: 2.4 CM
BH CV ECHO MEAS - SV(LVOT): 82.3 ML
BH CV ECHO MEAS - SV(MOD-SP2): 111 ML
BH CV ECHO MEAS - SV(MOD-SP4): 115 ML
BH CV ECHO MEAS - SVI(LVOT): 31.4 ML/M2
BH CV ECHO MEAS - SVI(MOD-SP2): 42.4 ML/M2
BH CV ECHO MEAS - SVI(MOD-SP4): 43.9 ML/M2
BH CV ECHO MEAS - TAPSE (>1.6): 2.34 CM
BH CV ECHO MEAS - TR MAX PG: 24.2 MMHG
BH CV ECHO MEAS - TR MAX VEL: 246 CM/SEC
BH CV ECHO MEASUREMENTS AVERAGE E/E' RATIO: 13.37
BH CV ECHO SHUNT ASSESSMENT PERFORMED (HIDDEN SCRIPTING): 1
BH CV LOWER VASCULAR LEFT COMMON FEMORAL AUGMENT: NORMAL
BH CV LOWER VASCULAR LEFT COMMON FEMORAL COMPETENT: NORMAL
BH CV LOWER VASCULAR LEFT COMMON FEMORAL COMPRESS: NORMAL
BH CV LOWER VASCULAR LEFT COMMON FEMORAL PHASIC: NORMAL
BH CV LOWER VASCULAR LEFT COMMON FEMORAL SPONT: NORMAL
BH CV LOWER VASCULAR LEFT DISTAL FEMORAL COMPRESS: NORMAL
BH CV LOWER VASCULAR LEFT GASTRONEMIUS COMPRESS: NORMAL
BH CV LOWER VASCULAR LEFT GREATER SAPH AK COMPRESS: NORMAL
BH CV LOWER VASCULAR LEFT GREATER SAPH BK COMPRESS: NORMAL
BH CV LOWER VASCULAR LEFT LESSER SAPH COMPRESS: NORMAL
BH CV LOWER VASCULAR LEFT MID FEMORAL AUGMENT: NORMAL
BH CV LOWER VASCULAR LEFT MID FEMORAL COMPETENT: NORMAL
BH CV LOWER VASCULAR LEFT MID FEMORAL COMPRESS: NORMAL
BH CV LOWER VASCULAR LEFT MID FEMORAL PHASIC: NORMAL
BH CV LOWER VASCULAR LEFT MID FEMORAL SPONT: NORMAL
BH CV LOWER VASCULAR LEFT PERONEAL AUGMENT: NORMAL
BH CV LOWER VASCULAR LEFT POPLITEAL AUGMENT: NORMAL
BH CV LOWER VASCULAR LEFT POPLITEAL COMPETENT: NORMAL
BH CV LOWER VASCULAR LEFT POPLITEAL COMPRESS: NORMAL
BH CV LOWER VASCULAR LEFT POPLITEAL PHASIC: NORMAL
BH CV LOWER VASCULAR LEFT POPLITEAL SPONT: NORMAL
BH CV LOWER VASCULAR LEFT POSTERIOR TIBIAL COMPRESS: NORMAL
BH CV LOWER VASCULAR LEFT PROFUNDA FEMORAL COMPRESS: NORMAL
BH CV LOWER VASCULAR LEFT PROXIMAL FEMORAL COMPRESS: NORMAL
BH CV LOWER VASCULAR LEFT SAPHENOFEMORAL JUNCTION COMPRESS: NORMAL
BH CV LOWER VASCULAR RIGHT COMMON FEMORAL AUGMENT: NORMAL
BH CV LOWER VASCULAR RIGHT COMMON FEMORAL COMPETENT: NORMAL
BH CV LOWER VASCULAR RIGHT COMMON FEMORAL PHASIC: NORMAL
BH CV LOWER VASCULAR RIGHT COMMON FEMORAL SPONT: NORMAL
BH CV LOWER VASCULAR RIGHT DISTAL FEMORAL AUGMENT: NORMAL
BH CV LOWER VASCULAR RIGHT GREATER SAPH AK COMPRESS: NORMAL
BH CV LOWER VASCULAR RIGHT GREATER SAPH BK COMPRESS: NORMAL
BH CV LOWER VASCULAR RIGHT MID FEMORAL AUGMENT: NORMAL
BH CV LOWER VASCULAR RIGHT MID FEMORAL COMPETENT: NORMAL
BH CV LOWER VASCULAR RIGHT MID FEMORAL PHASIC: NORMAL
BH CV LOWER VASCULAR RIGHT MID FEMORAL SPONT: NORMAL
BH CV LOWER VASCULAR RIGHT PERONEAL AUGMENT: NORMAL
BH CV LOWER VASCULAR RIGHT POPLITEAL AUGMENT: NORMAL
BH CV LOWER VASCULAR RIGHT POPLITEAL COMPETENT: NORMAL
BH CV LOWER VASCULAR RIGHT POPLITEAL COMPRESS: NORMAL
BH CV LOWER VASCULAR RIGHT POPLITEAL PHASIC: NORMAL
BH CV LOWER VASCULAR RIGHT POPLITEAL SPONT: NORMAL
BH CV LOWER VASCULAR RIGHT POSTERIOR TIBIAL AUGMENT: NORMAL
BH CV LOWER VASCULAR RIGHT PROFUNDA FEMORAL AUGMENT: NORMAL
BH CV LOWER VASCULAR RIGHT PROFUNDA FEMORAL COMPETENT: NORMAL
BH CV LOWER VASCULAR RIGHT PROFUNDA FEMORAL PHASIC: NORMAL
BH CV LOWER VASCULAR RIGHT PROFUNDA FEMORAL SPONT: NORMAL
BH CV LOWER VASCULAR RIGHT PROXIMAL FEMORAL AUGMENT: NORMAL
BH CV LOWER VASCULAR RIGHT PROXIMAL FEMORAL COMPETENT: NORMAL
BH CV LOWER VASCULAR RIGHT PROXIMAL FEMORAL PHASIC: NORMAL
BH CV LOWER VASCULAR RIGHT PROXIMAL FEMORAL SPONT: NORMAL
BH CV LOWER VASCULAR RIGHT SAPHENOFEMORAL JUNCTION AUGMENT: NORMAL
BH CV XLRA - RV BASE: 3.9 CM
BH CV XLRA - RV LENGTH: 7.3 CM
BH CV XLRA - RV MID: 2.7 CM
BH CV XLRA - TDI S': 14.9 CM/SEC
BUN SERPL-MCNC: 28 MG/DL (ref 8–23)
BUN/CREAT SERPL: 15.6 (ref 7–25)
CALCIUM SPEC-SCNC: 7.8 MG/DL (ref 8.6–10.5)
CHLORIDE SERPL-SCNC: 108 MMOL/L (ref 98–107)
CHOLEST SERPL-MCNC: 98 MG/DL (ref 0–200)
CO2 SERPL-SCNC: 20 MMOL/L (ref 22–29)
CREAT SERPL-MCNC: 1.79 MG/DL (ref 0.57–1)
DEPRECATED RDW RBC AUTO: 41.9 FL (ref 37–54)
DEPRECATED RDW RBC AUTO: 44.7 FL (ref 37–54)
EGFRCR SERPLBLD CKD-EPI 2021: 31.3 ML/MIN/1.73
EOSINOPHIL # BLD AUTO: 0.32 10*3/MM3 (ref 0–0.4)
EOSINOPHIL # BLD AUTO: 0.34 10*3/MM3 (ref 0–0.4)
EOSINOPHIL NFR BLD AUTO: 2.3 % (ref 0.3–6.2)
EOSINOPHIL NFR BLD AUTO: 2.7 % (ref 0.3–6.2)
ERYTHROCYTE [DISTWIDTH] IN BLOOD BY AUTOMATED COUNT: 16.5 % (ref 12.3–15.4)
ERYTHROCYTE [DISTWIDTH] IN BLOOD BY AUTOMATED COUNT: 16.9 % (ref 12.3–15.4)
GLUCOSE BLDC GLUCOMTR-MCNC: 101 MG/DL (ref 65–99)
GLUCOSE BLDC GLUCOMTR-MCNC: 120 MG/DL (ref 65–99)
GLUCOSE BLDC GLUCOMTR-MCNC: 65 MG/DL (ref 65–99)
GLUCOSE BLDC GLUCOMTR-MCNC: 69 MG/DL (ref 65–99)
GLUCOSE BLDC GLUCOMTR-MCNC: 70 MG/DL (ref 65–99)
GLUCOSE BLDC GLUCOMTR-MCNC: 77 MG/DL (ref 65–99)
GLUCOSE BLDC GLUCOMTR-MCNC: 84 MG/DL (ref 65–99)
GLUCOSE SERPL-MCNC: 79 MG/DL (ref 65–99)
HBA1C MFR BLD: 5.8 % (ref 4.8–5.6)
HCT VFR BLD AUTO: 27.5 % (ref 34–46.6)
HCT VFR BLD AUTO: 28.8 % (ref 34–46.6)
HDLC SERPL-MCNC: 46 MG/DL (ref 40–60)
HGB BLD-MCNC: 8.5 G/DL (ref 12–15.9)
HGB BLD-MCNC: 8.8 G/DL (ref 12–15.9)
IMM GRANULOCYTES # BLD AUTO: 0.04 10*3/MM3 (ref 0–0.05)
IMM GRANULOCYTES # BLD AUTO: 0.07 10*3/MM3 (ref 0–0.05)
IMM GRANULOCYTES NFR BLD AUTO: 0.3 % (ref 0–0.5)
IMM GRANULOCYTES NFR BLD AUTO: 0.5 % (ref 0–0.5)
LDLC SERPL CALC-MCNC: 31 MG/DL (ref 0–100)
LDLC/HDLC SERPL: 0.62 {RATIO}
LEFT ATRIUM VOLUME INDEX: 29.2 ML/M2
LV EF BIPLANE MOD: 72.5 %
LYMPHOCYTES # BLD AUTO: 0.52 10*3/MM3 (ref 0.7–3.1)
LYMPHOCYTES # BLD AUTO: 0.56 10*3/MM3 (ref 0.7–3.1)
LYMPHOCYTES NFR BLD AUTO: 3.7 % (ref 19.6–45.3)
LYMPHOCYTES NFR BLD AUTO: 4.4 % (ref 19.6–45.3)
MAGNESIUM SERPL-MCNC: 1.8 MG/DL (ref 1.6–2.4)
MCH RBC QN AUTO: 23.9 PG (ref 26.6–33)
MCH RBC QN AUTO: 24 PG (ref 26.6–33)
MCHC RBC AUTO-ENTMCNC: 30.6 G/DL (ref 31.5–35.7)
MCHC RBC AUTO-ENTMCNC: 30.9 G/DL (ref 31.5–35.7)
MCV RBC AUTO: 77.5 FL (ref 79–97)
MCV RBC AUTO: 78.5 FL (ref 79–97)
MONOCYTES # BLD AUTO: 0.7 10*3/MM3 (ref 0.1–0.9)
MONOCYTES # BLD AUTO: 0.81 10*3/MM3 (ref 0.1–0.9)
MONOCYTES NFR BLD AUTO: 5.5 % (ref 5–12)
MONOCYTES NFR BLD AUTO: 5.8 % (ref 5–12)
NEUTROPHILS NFR BLD AUTO: 10.9 10*3/MM3 (ref 1.7–7)
NEUTROPHILS NFR BLD AUTO: 12.23 10*3/MM3 (ref 1.7–7)
NEUTROPHILS NFR BLD AUTO: 86.3 % (ref 42.7–76)
NEUTROPHILS NFR BLD AUTO: 87 % (ref 42.7–76)
NRBC BLD AUTO-RTO: 0 /100 WBC (ref 0–0.2)
NRBC BLD AUTO-RTO: 0 /100 WBC (ref 0–0.2)
PLATELET # BLD AUTO: 222 10*3/MM3 (ref 140–450)
PLATELET # BLD AUTO: 246 10*3/MM3 (ref 140–450)
PMV BLD AUTO: 10.8 FL (ref 6–12)
PMV BLD AUTO: 10.8 FL (ref 6–12)
POTASSIUM SERPL-SCNC: 3.1 MMOL/L (ref 3.5–5.2)
POTASSIUM SERPL-SCNC: 3.1 MMOL/L (ref 3.5–5.2)
POTASSIUM SERPL-SCNC: 3.4 MMOL/L (ref 3.5–5.2)
QT INTERVAL: 409 MS
QTC INTERVAL: 460 MS
RBC # BLD AUTO: 3.55 10*6/MM3 (ref 3.77–5.28)
RBC # BLD AUTO: 3.67 10*6/MM3 (ref 3.77–5.28)
SINUS: 2.9 CM
SODIUM SERPL-SCNC: 141 MMOL/L (ref 136–145)
STJ: 2.7 CM
TRIGL SERPL-MCNC: 118 MG/DL (ref 0–150)
VLDLC SERPL-MCNC: 21 MG/DL (ref 5–40)
WBC NRBC COR # BLD AUTO: 12.64 10*3/MM3 (ref 3.4–10.8)
WBC NRBC COR # BLD AUTO: 14.05 10*3/MM3 (ref 3.4–10.8)

## 2025-08-21 PROCEDURE — 25010000002 HEPARIN (PORCINE) 25000-0.45 UT/250ML-% SOLUTION: Performed by: INTERNAL MEDICINE

## 2025-08-21 PROCEDURE — 82948 REAGENT STRIP/BLOOD GLUCOSE: CPT

## 2025-08-21 PROCEDURE — 25010000002 MAGNESIUM SULFATE 2 GM/50ML SOLUTION: Performed by: INTERNAL MEDICINE

## 2025-08-21 PROCEDURE — 83036 HEMOGLOBIN GLYCOSYLATED A1C: CPT | Performed by: INTERNAL MEDICINE

## 2025-08-21 PROCEDURE — 84132 ASSAY OF SERUM POTASSIUM: CPT | Performed by: INTERNAL MEDICINE

## 2025-08-21 PROCEDURE — 85025 COMPLETE CBC W/AUTO DIFF WBC: CPT

## 2025-08-21 PROCEDURE — 85730 THROMBOPLASTIN TIME PARTIAL: CPT | Performed by: INTERNAL MEDICINE

## 2025-08-21 PROCEDURE — 25010000002 POTASSIUM CHLORIDE 10 MEQ/100ML SOLUTION: Performed by: INTERNAL MEDICINE

## 2025-08-21 PROCEDURE — 80061 LIPID PANEL: CPT | Performed by: INTERNAL MEDICINE

## 2025-08-21 PROCEDURE — 84132 ASSAY OF SERUM POTASSIUM: CPT

## 2025-08-21 PROCEDURE — 25510000001 PERFLUTREN 6.52 MG/ML SUSPENSION 2 ML VIAL: Performed by: INTERNAL MEDICINE

## 2025-08-21 PROCEDURE — 82948 REAGENT STRIP/BLOOD GLUCOSE: CPT | Performed by: INTERNAL MEDICINE

## 2025-08-21 PROCEDURE — 93306 TTE W/DOPPLER COMPLETE: CPT

## 2025-08-21 PROCEDURE — 93970 EXTREMITY STUDY: CPT

## 2025-08-21 PROCEDURE — 70551 MRI BRAIN STEM W/O DYE: CPT

## 2025-08-21 PROCEDURE — 25810000003 SODIUM CHLORIDE 0.9 % SOLUTION: Performed by: INTERNAL MEDICINE

## 2025-08-21 PROCEDURE — 92523 SPEECH SOUND LANG COMPREHEN: CPT

## 2025-08-21 PROCEDURE — 93356 MYOCRD STRAIN IMG SPCKL TRCK: CPT | Performed by: INTERNAL MEDICINE

## 2025-08-21 PROCEDURE — 83735 ASSAY OF MAGNESIUM: CPT | Performed by: INTERNAL MEDICINE

## 2025-08-21 PROCEDURE — 80048 BASIC METABOLIC PNL TOTAL CA: CPT

## 2025-08-21 PROCEDURE — 99233 SBSQ HOSP IP/OBS HIGH 50: CPT | Performed by: STUDENT IN AN ORGANIZED HEALTH CARE EDUCATION/TRAINING PROGRAM

## 2025-08-21 PROCEDURE — 93306 TTE W/DOPPLER COMPLETE: CPT | Performed by: INTERNAL MEDICINE

## 2025-08-21 PROCEDURE — 93356 MYOCRD STRAIN IMG SPCKL TRCK: CPT

## 2025-08-21 PROCEDURE — 93971 EXTREMITY STUDY: CPT | Performed by: SURGERY

## 2025-08-21 RX ORDER — PHENYLEPHRINE HCL IN 0.9% NACL 0.5 MG/5ML
.5-3 SYRINGE (ML) INTRAVENOUS
Status: DISCONTINUED | OUTPATIENT
Start: 2025-08-21 | End: 2025-08-23

## 2025-08-21 RX ORDER — POTASSIUM CHLORIDE 1500 MG/1
40 TABLET, EXTENDED RELEASE ORAL EVERY 4 HOURS
Status: COMPLETED | OUTPATIENT
Start: 2025-08-21 | End: 2025-08-21

## 2025-08-21 RX ORDER — LEVOFLOXACIN 750 MG/1
750 TABLET, FILM COATED ORAL EVERY 24 HOURS
Status: DISCONTINUED | OUTPATIENT
Start: 2025-08-22 | End: 2025-08-24 | Stop reason: HOSPADM

## 2025-08-21 RX ORDER — PANTOPRAZOLE SODIUM 40 MG/1
40 TABLET, DELAYED RELEASE ORAL
Status: DISCONTINUED | OUTPATIENT
Start: 2025-08-22 | End: 2025-08-24 | Stop reason: HOSPADM

## 2025-08-21 RX ORDER — POTASSIUM CHLORIDE 1500 MG/1
40 TABLET, EXTENDED RELEASE ORAL EVERY 4 HOURS
Status: COMPLETED | OUTPATIENT
Start: 2025-08-21 | End: 2025-08-22

## 2025-08-21 RX ADMIN — POTASSIUM CHLORIDE 40 MEQ: 1500 TABLET, EXTENDED RELEASE ORAL at 22:24

## 2025-08-21 RX ADMIN — Medication 0.5 MCG/KG/MIN: at 02:59

## 2025-08-21 RX ADMIN — POTASSIUM CHLORIDE 10 MEQ: 7.46 INJECTION, SOLUTION INTRAVENOUS at 01:12

## 2025-08-21 RX ADMIN — HEPARIN SODIUM 12.21 UNITS/KG/HR: 10000 INJECTION, SOLUTION INTRAVENOUS at 11:48

## 2025-08-21 RX ADMIN — SODIUM CHLORIDE 500 ML: 9 INJECTION, SOLUTION INTRAVENOUS at 01:34

## 2025-08-21 RX ADMIN — Medication 0.5 MCG/KG/MIN: at 02:18

## 2025-08-21 RX ADMIN — POTASSIUM CHLORIDE 10 MEQ: 7.46 INJECTION, SOLUTION INTRAVENOUS at 00:12

## 2025-08-21 RX ADMIN — POTASSIUM CHLORIDE 40 MEQ: 1500 TABLET, EXTENDED RELEASE ORAL at 12:36

## 2025-08-21 RX ADMIN — METRONIDAZOLE 500 MG: 500 TABLET ORAL at 08:45

## 2025-08-21 RX ADMIN — HEPARIN SODIUM 9.21 UNITS/KG/HR: 10000 INJECTION, SOLUTION INTRAVENOUS at 02:52

## 2025-08-21 RX ADMIN — MAGNESIUM SULFATE IN WATER FOR 4 G: 40 INJECTION INTRAVENOUS at 00:11

## 2025-08-21 RX ADMIN — METRONIDAZOLE 500 MG: 500 TABLET ORAL at 20:49

## 2025-08-21 RX ADMIN — METRONIDAZOLE 500 MG: 500 TABLET ORAL at 16:51

## 2025-08-21 RX ADMIN — ATORVASTATIN CALCIUM 80 MG: 80 TABLET, FILM COATED ORAL at 20:49

## 2025-08-21 RX ADMIN — Medication 0.5 MCG/KG/MIN: at 12:36

## 2025-08-21 RX ADMIN — POTASSIUM CHLORIDE 40 MEQ: 1500 TABLET, EXTENDED RELEASE ORAL at 08:45

## 2025-08-21 RX ADMIN — Medication 10 ML: at 20:50

## 2025-08-21 RX ADMIN — Medication 10 ML: at 08:46

## 2025-08-21 RX ADMIN — POTASSIUM CHLORIDE 40 MEQ: 1500 TABLET, EXTENDED RELEASE ORAL at 16:51

## 2025-08-21 RX ADMIN — ASPIRIN 81 MG CHEWABLE TABLET 81 MG: 81 TABLET CHEWABLE at 08:45

## 2025-08-21 RX ADMIN — PERFLUTREN 2.5 ML: 6.52 INJECTION, SUSPENSION INTRAVENOUS at 11:12

## 2025-08-21 RX ADMIN — MUPIROCIN 1 APPLICATION: 20 OINTMENT TOPICAL at 08:45

## 2025-08-21 RX ADMIN — LEVOFLOXACIN 750 MG: 750 TABLET, FILM COATED ORAL at 08:45

## 2025-08-21 RX ADMIN — POTASSIUM CHLORIDE 10 MEQ: 7.46 INJECTION, SOLUTION INTRAVENOUS at 02:07

## 2025-08-21 RX ADMIN — Medication 0.5 MCG/KG/MIN: at 22:13

## 2025-08-21 RX ADMIN — DEXTROSE 15 G: 15 GEL ORAL at 00:12

## 2025-08-21 RX ADMIN — SODIUM CHLORIDE 125 ML/HR: 9 INJECTION, SOLUTION INTRAVENOUS at 11:47

## 2025-08-22 ENCOUNTER — INPATIENT HOSPITAL (OUTPATIENT)
Dept: URBAN - METROPOLITAN AREA HOSPITAL 113 | Facility: HOSPITAL | Age: 65
End: 2025-08-22
Payer: COMMERCIAL

## 2025-08-22 DIAGNOSIS — R11.0 NAUSEA: ICD-10-CM

## 2025-08-22 DIAGNOSIS — R10.9 UNSPECIFIED ABDOMINAL PAIN: ICD-10-CM

## 2025-08-22 LAB
ANION GAP SERPL CALCULATED.3IONS-SCNC: 12.9 MMOL/L (ref 5–15)
APTT PPP: 111.1 SECONDS (ref 22.7–35.4)
APTT PPP: 56.5 SECONDS (ref 22.7–35.4)
APTT PPP: 75.2 SECONDS (ref 22.7–35.4)
BASOPHILS # BLD AUTO: 0.05 10*3/MM3 (ref 0–0.2)
BASOPHILS NFR BLD AUTO: 0.7 % (ref 0–1.5)
BUN SERPL-MCNC: 24 MG/DL (ref 8–23)
BUN/CREAT SERPL: 14.4 (ref 7–25)
CALCIUM SPEC-SCNC: 7.7 MG/DL (ref 8.6–10.5)
CHLORIDE SERPL-SCNC: 109 MMOL/L (ref 98–107)
CO2 SERPL-SCNC: 23.1 MMOL/L (ref 22–29)
CREAT SERPL-MCNC: 1.67 MG/DL (ref 0.57–1)
DEPRECATED RDW RBC AUTO: 44.8 FL (ref 37–54)
EGFRCR SERPLBLD CKD-EPI 2021: 34.1 ML/MIN/1.73
EOSINOPHIL # BLD AUTO: 0.27 10*3/MM3 (ref 0–0.4)
EOSINOPHIL NFR BLD AUTO: 3.5 % (ref 0.3–6.2)
ERYTHROCYTE [DISTWIDTH] IN BLOOD BY AUTOMATED COUNT: 17 % (ref 12.3–15.4)
GLUCOSE BLDC GLUCOMTR-MCNC: 104 MG/DL (ref 65–99)
GLUCOSE BLDC GLUCOMTR-MCNC: 108 MG/DL (ref 65–99)
GLUCOSE BLDC GLUCOMTR-MCNC: 141 MG/DL (ref 65–99)
GLUCOSE BLDC GLUCOMTR-MCNC: 147 MG/DL (ref 65–99)
GLUCOSE SERPL-MCNC: 106 MG/DL (ref 65–99)
HCT VFR BLD AUTO: 23.5 % (ref 34–46.6)
HGB BLD-MCNC: 7.1 G/DL (ref 12–15.9)
IMM GRANULOCYTES # BLD AUTO: 0.03 10*3/MM3 (ref 0–0.05)
IMM GRANULOCYTES NFR BLD AUTO: 0.4 % (ref 0–0.5)
LYMPHOCYTES # BLD AUTO: 0.65 10*3/MM3 (ref 0.7–3.1)
LYMPHOCYTES NFR BLD AUTO: 8.5 % (ref 19.6–45.3)
MCH RBC QN AUTO: 23.6 PG (ref 26.6–33)
MCHC RBC AUTO-ENTMCNC: 30.2 G/DL (ref 31.5–35.7)
MCV RBC AUTO: 78.1 FL (ref 79–97)
MONOCYTES # BLD AUTO: 0.61 10*3/MM3 (ref 0.1–0.9)
MONOCYTES NFR BLD AUTO: 7.9 % (ref 5–12)
NEUTROPHILS NFR BLD AUTO: 6.07 10*3/MM3 (ref 1.7–7)
NEUTROPHILS NFR BLD AUTO: 79 % (ref 42.7–76)
NRBC BLD AUTO-RTO: 0 /100 WBC (ref 0–0.2)
PLATELET # BLD AUTO: 192 10*3/MM3 (ref 140–450)
PMV BLD AUTO: 10.6 FL (ref 6–12)
POTASSIUM SERPL-SCNC: 3.4 MMOL/L (ref 3.5–5.2)
POTASSIUM SERPL-SCNC: 3.9 MMOL/L (ref 3.5–5.2)
RBC # BLD AUTO: 3.01 10*6/MM3 (ref 3.77–5.28)
SODIUM SERPL-SCNC: 145 MMOL/L (ref 136–145)
WBC NRBC COR # BLD AUTO: 7.68 10*3/MM3 (ref 3.4–10.8)

## 2025-08-22 PROCEDURE — 25010000002 PROCHLORPERAZINE 10 MG/2ML SOLUTION: Performed by: INTERNAL MEDICINE

## 2025-08-22 PROCEDURE — 84132 ASSAY OF SERUM POTASSIUM: CPT | Performed by: INTERNAL MEDICINE

## 2025-08-22 PROCEDURE — 99223 1ST HOSP IP/OBS HIGH 75: CPT | Performed by: INTERNAL MEDICINE

## 2025-08-22 PROCEDURE — 99233 SBSQ HOSP IP/OBS HIGH 50: CPT | Performed by: STUDENT IN AN ORGANIZED HEALTH CARE EDUCATION/TRAINING PROGRAM

## 2025-08-22 PROCEDURE — 25010000002 HEPARIN (PORCINE) 25000-0.45 UT/250ML-% SOLUTION: Performed by: INTERNAL MEDICINE

## 2025-08-22 PROCEDURE — 82948 REAGENT STRIP/BLOOD GLUCOSE: CPT

## 2025-08-22 PROCEDURE — 25010000002 POTASSIUM CHLORIDE 10 MEQ/100ML SOLUTION: Performed by: INTERNAL MEDICINE

## 2025-08-22 PROCEDURE — 85025 COMPLETE CBC W/AUTO DIFF WBC: CPT | Performed by: INTERNAL MEDICINE

## 2025-08-22 PROCEDURE — 85730 THROMBOPLASTIN TIME PARTIAL: CPT | Performed by: INTERNAL MEDICINE

## 2025-08-22 PROCEDURE — 25010000002 ONDANSETRON PER 1 MG: Performed by: INTERNAL MEDICINE

## 2025-08-22 PROCEDURE — 80048 BASIC METABOLIC PNL TOTAL CA: CPT

## 2025-08-22 RX ORDER — POTASSIUM CHLORIDE 7.45 MG/ML
10 INJECTION INTRAVENOUS
Status: COMPLETED | OUTPATIENT
Start: 2025-08-22 | End: 2025-08-22

## 2025-08-22 RX ORDER — POTASSIUM CHLORIDE 1500 MG/1
40 TABLET, EXTENDED RELEASE ORAL EVERY 4 HOURS
Status: DISPENSED | OUTPATIENT
Start: 2025-08-22 | End: 2025-08-22

## 2025-08-22 RX ORDER — CLOPIDOGREL BISULFATE 75 MG/1
300 TABLET ORAL DAILY
Status: COMPLETED | OUTPATIENT
Start: 2025-08-22 | End: 2025-08-22

## 2025-08-22 RX ORDER — PROCHLORPERAZINE EDISYLATE 5 MG/ML
5 INJECTION INTRAMUSCULAR; INTRAVENOUS EVERY 6 HOURS PRN
Status: DISCONTINUED | OUTPATIENT
Start: 2025-08-22 | End: 2025-08-24 | Stop reason: HOSPADM

## 2025-08-22 RX ORDER — CLOPIDOGREL BISULFATE 75 MG/1
75 TABLET ORAL DAILY
Status: DISCONTINUED | OUTPATIENT
Start: 2025-08-23 | End: 2025-08-24 | Stop reason: HOSPADM

## 2025-08-22 RX ORDER — ONDANSETRON 2 MG/ML
4 INJECTION INTRAMUSCULAR; INTRAVENOUS EVERY 6 HOURS PRN
Status: DISCONTINUED | OUTPATIENT
Start: 2025-08-22 | End: 2025-08-24 | Stop reason: HOSPADM

## 2025-08-22 RX ORDER — CALCIUM CARBONATE 500 MG/1
2 TABLET, CHEWABLE ORAL 2 TIMES DAILY PRN
Status: DISCONTINUED | OUTPATIENT
Start: 2025-08-22 | End: 2025-08-24 | Stop reason: HOSPADM

## 2025-08-22 RX ADMIN — MUPIROCIN 1 APPLICATION: 20 OINTMENT TOPICAL at 20:16

## 2025-08-22 RX ADMIN — ASPIRIN 81 MG CHEWABLE TABLET 81 MG: 81 TABLET CHEWABLE at 08:35

## 2025-08-22 RX ADMIN — ANTACID TABLETS 2 TABLET: 500 TABLET, CHEWABLE ORAL at 10:28

## 2025-08-22 RX ADMIN — HEPARIN SODIUM 13.21 UNITS/KG/HR: 10000 INJECTION, SOLUTION INTRAVENOUS at 00:38

## 2025-08-22 RX ADMIN — MUPIROCIN 1 APPLICATION: 20 OINTMENT TOPICAL at 08:35

## 2025-08-22 RX ADMIN — POTASSIUM CHLORIDE 10 MEQ: 7.46 INJECTION, SOLUTION INTRAVENOUS at 17:11

## 2025-08-22 RX ADMIN — POTASSIUM CHLORIDE 40 MEQ: 1500 TABLET, EXTENDED RELEASE ORAL at 02:18

## 2025-08-22 RX ADMIN — ONDANSETRON 4 MG: 2 INJECTION INTRAMUSCULAR; INTRAVENOUS at 11:40

## 2025-08-22 RX ADMIN — LEVOFLOXACIN 750 MG: 750 TABLET, FILM COATED ORAL at 08:35

## 2025-08-22 RX ADMIN — PROCHLORPERAZINE EDISYLATE 5 MG: 5 INJECTION INTRAMUSCULAR; INTRAVENOUS at 17:06

## 2025-08-22 RX ADMIN — ATORVASTATIN CALCIUM 80 MG: 80 TABLET, FILM COATED ORAL at 20:16

## 2025-08-22 RX ADMIN — METRONIDAZOLE 500 MG: 500 TABLET ORAL at 08:35

## 2025-08-22 RX ADMIN — POTASSIUM CHLORIDE 40 MEQ: 1500 TABLET, EXTENDED RELEASE ORAL at 06:39

## 2025-08-22 RX ADMIN — Medication 10 ML: at 09:00

## 2025-08-22 RX ADMIN — POTASSIUM CHLORIDE 10 MEQ: 7.46 INJECTION, SOLUTION INTRAVENOUS at 18:01

## 2025-08-22 RX ADMIN — PANTOPRAZOLE SODIUM 40 MG: 40 TABLET, DELAYED RELEASE ORAL at 06:27

## 2025-08-22 RX ADMIN — CLOPIDOGREL BISULFATE 300 MG: 75 TABLET, FILM COATED ORAL at 20:17

## 2025-08-22 RX ADMIN — ONDANSETRON 4 MG: 2 INJECTION INTRAMUSCULAR; INTRAVENOUS at 20:08

## 2025-08-22 RX ADMIN — Medication 10 ML: at 20:19

## 2025-08-23 LAB
APTT PPP: 24.8 SECONDS (ref 22.7–35.4)
BASOPHILS # BLD AUTO: 0.02 10*3/MM3 (ref 0–0.2)
BASOPHILS NFR BLD AUTO: 0.3 % (ref 0–1.5)
DEPRECATED RDW RBC AUTO: 46.6 FL (ref 37–54)
EOSINOPHIL # BLD AUTO: 0.03 10*3/MM3 (ref 0–0.4)
EOSINOPHIL NFR BLD AUTO: 0.4 % (ref 0.3–6.2)
ERYTHROCYTE [DISTWIDTH] IN BLOOD BY AUTOMATED COUNT: 17.4 % (ref 12.3–15.4)
GLUCOSE BLDC GLUCOMTR-MCNC: 116 MG/DL (ref 65–99)
GLUCOSE BLDC GLUCOMTR-MCNC: 118 MG/DL (ref 65–99)
GLUCOSE BLDC GLUCOMTR-MCNC: 146 MG/DL (ref 65–99)
GLUCOSE BLDC GLUCOMTR-MCNC: 94 MG/DL (ref 65–99)
HCT VFR BLD AUTO: 26.5 % (ref 34–46.6)
HGB BLD-MCNC: 8.1 G/DL (ref 12–15.9)
IMM GRANULOCYTES # BLD AUTO: 0.03 10*3/MM3 (ref 0–0.05)
IMM GRANULOCYTES NFR BLD AUTO: 0.4 % (ref 0–0.5)
LYMPHOCYTES # BLD AUTO: 0.59 10*3/MM3 (ref 0.7–3.1)
LYMPHOCYTES NFR BLD AUTO: 7.5 % (ref 19.6–45.3)
MCH RBC QN AUTO: 23.9 PG (ref 26.6–33)
MCHC RBC AUTO-ENTMCNC: 30.6 G/DL (ref 31.5–35.7)
MCV RBC AUTO: 78.2 FL (ref 79–97)
MONOCYTES # BLD AUTO: 0.64 10*3/MM3 (ref 0.1–0.9)
MONOCYTES NFR BLD AUTO: 8.1 % (ref 5–12)
NEUTROPHILS NFR BLD AUTO: 6.6 10*3/MM3 (ref 1.7–7)
NEUTROPHILS NFR BLD AUTO: 83.3 % (ref 42.7–76)
NRBC BLD AUTO-RTO: 0 /100 WBC (ref 0–0.2)
PLATELET # BLD AUTO: 218 10*3/MM3 (ref 140–450)
PMV BLD AUTO: 11.1 FL (ref 6–12)
RBC # BLD AUTO: 3.39 10*6/MM3 (ref 3.77–5.28)
WBC NRBC COR # BLD AUTO: 7.91 10*3/MM3 (ref 3.4–10.8)

## 2025-08-23 PROCEDURE — 36415 COLL VENOUS BLD VENIPUNCTURE: CPT | Performed by: INTERNAL MEDICINE

## 2025-08-23 PROCEDURE — 25010000002 ONDANSETRON PER 1 MG: Performed by: INTERNAL MEDICINE

## 2025-08-23 PROCEDURE — 82948 REAGENT STRIP/BLOOD GLUCOSE: CPT

## 2025-08-23 PROCEDURE — 99232 SBSQ HOSP IP/OBS MODERATE 35: CPT | Performed by: STUDENT IN AN ORGANIZED HEALTH CARE EDUCATION/TRAINING PROGRAM

## 2025-08-23 PROCEDURE — 97162 PT EVAL MOD COMPLEX 30 MIN: CPT

## 2025-08-23 PROCEDURE — 25010000002 PROCHLORPERAZINE 10 MG/2ML SOLUTION: Performed by: INTERNAL MEDICINE

## 2025-08-23 PROCEDURE — 85730 THROMBOPLASTIN TIME PARTIAL: CPT | Performed by: INTERNAL MEDICINE

## 2025-08-23 PROCEDURE — 97530 THERAPEUTIC ACTIVITIES: CPT

## 2025-08-23 PROCEDURE — 85025 COMPLETE CBC W/AUTO DIFF WBC: CPT | Performed by: INTERNAL MEDICINE

## 2025-08-23 PROCEDURE — 97165 OT EVAL LOW COMPLEX 30 MIN: CPT

## 2025-08-23 RX ADMIN — ONDANSETRON 4 MG: 2 INJECTION INTRAMUSCULAR; INTRAVENOUS at 08:11

## 2025-08-23 RX ADMIN — METRONIDAZOLE 500 MG: 500 TABLET ORAL at 16:51

## 2025-08-23 RX ADMIN — ATORVASTATIN CALCIUM 80 MG: 80 TABLET, FILM COATED ORAL at 20:00

## 2025-08-23 RX ADMIN — MUPIROCIN 1 APPLICATION: 20 OINTMENT TOPICAL at 20:00

## 2025-08-23 RX ADMIN — Medication 10 ML: at 10:03

## 2025-08-23 RX ADMIN — ONDANSETRON 4 MG: 2 INJECTION INTRAMUSCULAR; INTRAVENOUS at 16:51

## 2025-08-23 RX ADMIN — ASPIRIN 81 MG CHEWABLE TABLET 81 MG: 81 TABLET CHEWABLE at 10:02

## 2025-08-23 RX ADMIN — LEVOFLOXACIN 750 MG: 750 TABLET, FILM COATED ORAL at 10:02

## 2025-08-23 RX ADMIN — PANTOPRAZOLE SODIUM 40 MG: 40 TABLET, DELAYED RELEASE ORAL at 05:05

## 2025-08-23 RX ADMIN — MUPIROCIN 1 APPLICATION: 20 OINTMENT TOPICAL at 08:11

## 2025-08-23 RX ADMIN — METRONIDAZOLE 500 MG: 500 TABLET ORAL at 20:00

## 2025-08-23 RX ADMIN — METRONIDAZOLE 500 MG: 500 TABLET ORAL at 10:02

## 2025-08-23 RX ADMIN — CLOPIDOGREL BISULFATE 75 MG: 75 TABLET, FILM COATED ORAL at 10:02

## 2025-08-23 RX ADMIN — Medication 10 ML: at 20:00

## 2025-08-23 RX ADMIN — PROCHLORPERAZINE EDISYLATE 5 MG: 5 INJECTION INTRAMUSCULAR; INTRAVENOUS at 09:35

## 2025-08-24 VITALS
DIASTOLIC BLOOD PRESSURE: 58 MMHG | BODY MASS INDEX: 44.41 KG/M2 | SYSTOLIC BLOOD PRESSURE: 141 MMHG | HEART RATE: 71 BPM | OXYGEN SATURATION: 98 % | RESPIRATION RATE: 18 BRPM | TEMPERATURE: 98.1 F | HEIGHT: 68 IN | WEIGHT: 293 LBS

## 2025-08-24 LAB
APTT PPP: 23.4 SECONDS (ref 22.7–35.4)
BASOPHILS # BLD AUTO: 0.03 10*3/MM3 (ref 0–0.2)
BASOPHILS NFR BLD AUTO: 0.4 % (ref 0–1.5)
DEPRECATED RDW RBC AUTO: 48.1 FL (ref 37–54)
EOSINOPHIL # BLD AUTO: 0.22 10*3/MM3 (ref 0–0.4)
EOSINOPHIL NFR BLD AUTO: 2.9 % (ref 0.3–6.2)
ERYTHROCYTE [DISTWIDTH] IN BLOOD BY AUTOMATED COUNT: 17.5 % (ref 12.3–15.4)
GLUCOSE BLDC GLUCOMTR-MCNC: 100 MG/DL (ref 65–99)
GLUCOSE BLDC GLUCOMTR-MCNC: 98 MG/DL (ref 65–99)
HCT VFR BLD AUTO: 26 % (ref 34–46.6)
HGB BLD-MCNC: 8 G/DL (ref 12–15.9)
IMM GRANULOCYTES # BLD AUTO: 0.02 10*3/MM3 (ref 0–0.05)
IMM GRANULOCYTES NFR BLD AUTO: 0.3 % (ref 0–0.5)
LYMPHOCYTES # BLD AUTO: 0.59 10*3/MM3 (ref 0.7–3.1)
LYMPHOCYTES NFR BLD AUTO: 7.7 % (ref 19.6–45.3)
MCH RBC QN AUTO: 24.2 PG (ref 26.6–33)
MCHC RBC AUTO-ENTMCNC: 30.8 G/DL (ref 31.5–35.7)
MCV RBC AUTO: 78.8 FL (ref 79–97)
MONOCYTES # BLD AUTO: 0.58 10*3/MM3 (ref 0.1–0.9)
MONOCYTES NFR BLD AUTO: 7.5 % (ref 5–12)
NEUTROPHILS NFR BLD AUTO: 6.27 10*3/MM3 (ref 1.7–7)
NEUTROPHILS NFR BLD AUTO: 81.2 % (ref 42.7–76)
NRBC BLD AUTO-RTO: 0 /100 WBC (ref 0–0.2)
PLATELET # BLD AUTO: 199 10*3/MM3 (ref 140–450)
PMV BLD AUTO: 10.7 FL (ref 6–12)
RBC # BLD AUTO: 3.3 10*6/MM3 (ref 3.77–5.28)
WBC NRBC COR # BLD AUTO: 7.71 10*3/MM3 (ref 3.4–10.8)

## 2025-08-24 PROCEDURE — 36415 COLL VENOUS BLD VENIPUNCTURE: CPT | Performed by: INTERNAL MEDICINE

## 2025-08-24 PROCEDURE — 25010000002 ONDANSETRON PER 1 MG: Performed by: INTERNAL MEDICINE

## 2025-08-24 PROCEDURE — 85025 COMPLETE CBC W/AUTO DIFF WBC: CPT | Performed by: INTERNAL MEDICINE

## 2025-08-24 PROCEDURE — 82948 REAGENT STRIP/BLOOD GLUCOSE: CPT | Performed by: INTERNAL MEDICINE

## 2025-08-24 PROCEDURE — 85730 THROMBOPLASTIN TIME PARTIAL: CPT | Performed by: INTERNAL MEDICINE

## 2025-08-24 RX ORDER — ASPIRIN 81 MG/1
81 TABLET, CHEWABLE ORAL DAILY
Qty: 30 TABLET | Refills: 0 | Status: SHIPPED | OUTPATIENT
Start: 2025-08-25

## 2025-08-24 RX ORDER — CLOPIDOGREL BISULFATE 75 MG/1
75 TABLET ORAL DAILY
Qty: 30 TABLET | Refills: 0 | Status: SHIPPED | OUTPATIENT
Start: 2025-08-25

## 2025-08-24 RX ADMIN — ASPIRIN 81 MG CHEWABLE TABLET 81 MG: 81 TABLET CHEWABLE at 08:25

## 2025-08-24 RX ADMIN — MUPIROCIN 1 APPLICATION: 20 OINTMENT TOPICAL at 09:00

## 2025-08-24 RX ADMIN — ONDANSETRON 4 MG: 2 INJECTION INTRAMUSCULAR; INTRAVENOUS at 08:29

## 2025-08-24 RX ADMIN — METRONIDAZOLE 500 MG: 500 TABLET ORAL at 08:25

## 2025-08-24 RX ADMIN — PANTOPRAZOLE SODIUM 40 MG: 40 TABLET, DELAYED RELEASE ORAL at 05:47

## 2025-08-24 RX ADMIN — CLOPIDOGREL BISULFATE 75 MG: 75 TABLET, FILM COATED ORAL at 08:25

## 2025-08-24 RX ADMIN — Medication 10 ML: at 09:00

## 2025-08-24 RX ADMIN — LEVOFLOXACIN 750 MG: 750 TABLET, FILM COATED ORAL at 08:26

## 2025-08-25 ENCOUNTER — READMISSION MANAGEMENT (OUTPATIENT)
Dept: CALL CENTER | Facility: HOSPITAL | Age: 65
End: 2025-08-25
Payer: MEDICARE

## 2025-08-26 ENCOUNTER — TRANSITIONAL CARE MANAGEMENT TELEPHONE ENCOUNTER (OUTPATIENT)
Dept: CALL CENTER | Facility: HOSPITAL | Age: 65
End: 2025-08-26
Payer: MEDICARE

## 2025-08-26 ENCOUNTER — TELEPHONE (OUTPATIENT)
Dept: FAMILY MEDICINE CLINIC | Age: 65
End: 2025-08-26
Payer: MEDICARE

## 2025-08-27 ENCOUNTER — TELEPHONE (OUTPATIENT)
Dept: FAMILY MEDICINE CLINIC | Age: 65
End: 2025-08-27
Payer: MEDICARE

## 2025-08-27 RX ORDER — METRONIDAZOLE 500 MG/1
500 TABLET ORAL 3 TIMES DAILY
Qty: 27 TABLET | Refills: 0 | OUTPATIENT
Start: 2025-08-27

## (undated) DEVICE — STANDARD HYPODERMIC NEEDLE,POLYPROPYLENE HUB: Brand: MONOJECT

## (undated) DEVICE — Device

## (undated) DEVICE — WIREGUIDED RETRIEVAL BASKET: Brand: TRAPEZOID RX

## (undated) DEVICE — CODMAN® SURGICAL PATTIES 1/2" X 3" (1.27CM X 7.62CM): Brand: CODMAN®

## (undated) DEVICE — RETRIEVAL BALLOON CATHETER: Brand: EXTRACTOR™ PRO RX

## (undated) DEVICE — INTENDED FOR TISSUE SEPARATION, AND OTHER PROCEDURES THAT REQUIRE A SHARP SURGICAL BLADE TO PUNCTURE OR CUT.: Brand: BARD-PARKER ® CARBON RIB-BACK BLADES

## (undated) DEVICE — SYR LUERLOK 30CC

## (undated) DEVICE — SPHINCTEROTOME: Brand: DREAMTOME™ RX 44

## (undated) DEVICE — BLCK/BITE BLOX WO/DENTL/RIM W/STRAP 54F

## (undated) DEVICE — SLV SCD LEG COMFORT KENDALLSCD MD REPROC

## (undated) DEVICE — SINGLE USE DISTAL COVER MAJ-2315: Brand: SINGLE USE DISTAL COVER

## (undated) DEVICE — TUBING 1895522 5PK STRAIGHTSHOT TO XPS: Brand: STRAIGHTSHOT®

## (undated) DEVICE — THE STERILE LIGHT HANDLE COVER IS USED WITH STERIS SURGICAL LIGHTING AND VISUALIZATION SYSTEMS.

## (undated) DEVICE — RX DELIVERY SYSTEM: Brand: NAVIFLEX™ RX DELIVERY SYSTEM

## (undated) DEVICE — PAD,EYE,1-5/8X2 5/8,STERILE,LF,1/PK: Brand: MEDLINE

## (undated) DEVICE — DEV LK SNAPLOC GW OLYMPUS/COMPAT

## (undated) DEVICE — ENT-LF: Brand: MEDLINE INDUSTRIES, INC.

## (undated) DEVICE — TOWEL,OR,DSP,ST,BLUE,STD,4/PK,20PK/CS: Brand: MEDLINE

## (undated) DEVICE — KT ANTI FOG W/FLD AND SPNG

## (undated) DEVICE — MEDI-VAC NON-CONDUCTIVE SUCTION TUBING: Brand: CARDINAL HEALTH

## (undated) DEVICE — SPHINCTEROTOME: Brand: JAGTOME RX 39

## (undated) DEVICE — GLV SURG BIOGEL LTX PF 8 1/2

## (undated) DEVICE — BLADE 1884004HR TRICUT 5PK M4 4MM ROTATE: Brand: TRICUT

## (undated) DEVICE — DEFENDO AIR WATER SUCTION AND BIOPSY VALVE KIT: Brand: DEFENDO AIR/WATER/SUCTION AND BIOPSY VALVE